# Patient Record
Sex: FEMALE | Race: WHITE | NOT HISPANIC OR LATINO | Employment: FULL TIME | URBAN - METROPOLITAN AREA
[De-identification: names, ages, dates, MRNs, and addresses within clinical notes are randomized per-mention and may not be internally consistent; named-entity substitution may affect disease eponyms.]

---

## 2016-06-22 LAB
ALT SERPL-CCNC: 12 U/L (ref 6–29)
AST SERPL-CCNC: 16 U/L (ref 10–30)
CREAT SERPL-MCNC: 0.81 MG/DL (ref 0.5–1.1)
GFR SERPL CREATININE-BSD FRML MDRD: 100 ML/MIN/1.73M2
GLUCOSE SERPL-MCNC: 86 MG/DL (ref 65–99)
HEP C HIM: NORMAL
POTASSIUM SERPL-SCNC: 4.1 MMOL/L (ref 3.5–5.3)

## 2016-11-23 LAB — TSH SERPL-ACNC: 2.01 UIU/ML (ref 0.27–4.2)

## 2017-02-05 LAB — TSH SERPL-ACNC: 3.56 UIU/ML (ref 0.27–4.2)

## 2017-03-15 ENCOUNTER — TRANSFERRED RECORDS (OUTPATIENT)
Dept: HEALTH INFORMATION MANAGEMENT | Facility: CLINIC | Age: 27
End: 2017-03-15

## 2017-03-29 LAB — TSH SERPL-ACNC: 0.33 UIU/ML (ref 0.27–4.2)

## 2017-04-04 LAB — HEP C HIM: NORMAL

## 2017-04-20 LAB — TSH SERPL-ACNC: 2.05 UIU/ML (ref 0.27–4.2)

## 2017-05-05 ENCOUNTER — OFFICE VISIT (OUTPATIENT)
Dept: FAMILY MEDICINE | Facility: CLINIC | Age: 27
End: 2017-05-05
Payer: COMMERCIAL

## 2017-05-05 VITALS
SYSTOLIC BLOOD PRESSURE: 122 MMHG | OXYGEN SATURATION: 100 % | BODY MASS INDEX: 22.28 KG/M2 | DIASTOLIC BLOOD PRESSURE: 85 MMHG | WEIGHT: 118 LBS | HEIGHT: 61 IN | TEMPERATURE: 99.3 F | HEART RATE: 64 BPM

## 2017-05-05 DIAGNOSIS — F41.8 DEPRESSION WITH ANXIETY: Primary | ICD-10-CM

## 2017-05-05 PROCEDURE — 99203 OFFICE O/P NEW LOW 30 MIN: CPT | Performed by: INTERNAL MEDICINE

## 2017-05-05 RX ORDER — CITALOPRAM HYDROBROMIDE 20 MG/1
20 TABLET ORAL DAILY
Qty: 30 TABLET | Refills: 1 | Status: SHIPPED | OUTPATIENT
Start: 2017-05-05 | End: 2018-08-13 | Stop reason: DRUGHIGH

## 2017-05-05 RX ORDER — ALPRAZOLAM 0.5 MG
0.5 TABLET ORAL 2 TIMES DAILY PRN
Qty: 40 TABLET | Refills: 0 | Status: SHIPPED | OUTPATIENT
Start: 2017-05-05 | End: 2018-04-09

## 2017-05-05 ASSESSMENT — ANXIETY QUESTIONNAIRES
6. BECOMING EASILY ANNOYED OR IRRITABLE: MORE THAN HALF THE DAYS
7. FEELING AFRAID AS IF SOMETHING AWFUL MIGHT HAPPEN: NEARLY EVERY DAY
3. WORRYING TOO MUCH ABOUT DIFFERENT THINGS: NEARLY EVERY DAY
GAD7 TOTAL SCORE: 19
1. FEELING NERVOUS, ANXIOUS, OR ON EDGE: NEARLY EVERY DAY
5. BEING SO RESTLESS THAT IT IS HARD TO SIT STILL: MORE THAN HALF THE DAYS
IF YOU CHECKED OFF ANY PROBLEMS ON THIS QUESTIONNAIRE, HOW DIFFICULT HAVE THESE PROBLEMS MADE IT FOR YOU TO DO YOUR WORK, TAKE CARE OF THINGS AT HOME, OR GET ALONG WITH OTHER PEOPLE: VERY DIFFICULT
2. NOT BEING ABLE TO STOP OR CONTROL WORRYING: NEARLY EVERY DAY

## 2017-05-05 ASSESSMENT — ENCOUNTER SYMPTOMS
NAUSEA: 0
TREMORS: 0
PALPITATIONS: 0
CONSTIPATION: 0
WEIGHT LOSS: 0
HALLUCINATIONS: 0
INSOMNIA: 1
DIZZINESS: 0
VOMITING: 0
NERVOUS/ANXIOUS: 1
HEADACHES: 0
MYALGIAS: 0
BLURRED VISION: 0
DIARRHEA: 0
ABDOMINAL PAIN: 0
DEPRESSION: 1

## 2017-05-05 ASSESSMENT — PATIENT HEALTH QUESTIONNAIRE - PHQ9: 5. POOR APPETITE OR OVEREATING: NEARLY EVERY DAY

## 2017-05-05 NOTE — NURSING NOTE
"Chief Complaint   Patient presents with     Depression     pt wants to discuss going back on the citalopram; experiencing depression and anxiety since having 2 miscarriages and issues with infertility the past year     Anxiety       Initial /85 (BP Location: Left arm, Cuff Size: Adult Regular)  Pulse 64  Temp 99.3  F (37.4  C) (Oral)  Ht 5' 1\" (1.549 m)  Wt 118 lb (53.5 kg)  SpO2 100%  BMI 22.3 kg/m2 Estimated body mass index is 22.3 kg/(m^2) as calculated from the following:    Height as of this encounter: 5' 1\" (1.549 m).    Weight as of this encounter: 118 lb (53.5 kg).  Medication Reconciliation: complete     Daysi Mccallum MA    "

## 2017-05-05 NOTE — PATIENT INSTRUCTIONS
Call doctor if your depression/anxiety persist/worsens, or if you develop new symptoms or side effects from the medication.    Follow up in 1 month.

## 2017-05-05 NOTE — MR AVS SNAPSHOT
After Visit Summary   5/5/2017    Dave Meyer    MRN: [id number]           Patient Information     Date Of Birth          1990        Visit Information        Provider Department      5/5/2017 4:30 PM Rojas Pappas MD [facility]        Today's Diagnoses     Depression with anxiety    -  1      Care Instructions    Call doctor if your depression/anxiety persist/worsens, or if you develop new symptoms or side effects from the medication.    Follow up in 1 month.          Follow-ups after your visit        Who to contact     If you have questions or need follow up information about today's clinic visit or your schedule please contact [facility] directly at [phone].  Normal or non-critical lab and imaging results will be communicated to you by Cat Amaniahart, letter or phone within 4 business days after the clinic has received the results. If you do not hear from us within 7 days, please contact the clinic through Cat Amaniahart or phone. If you have a critical or abnormal lab result, we will notify you by phone as soon as possible.  Submit refill requests through [employer]iders or call your pharmacy and they will forward the refill request to us. Please allow 3 business days for your refill to be completed.          Additional Information About Your Visit        MyChart Information     [employer]iders gives you secure access to your electronic health record. If you see a primary care provider, you can also send messages to your care team and make appointments. If you have questions, please call your primary care clinic.  If you do not have a primary care provider, please call [phone] and they will assist you.        Care EveryWhere ID     This is your Care EveryWhere ID. This could be used by other organizations to access your [place] medical records  [id number]        Your Vitals Were     Pulse Temperature Height Pulse Oximetry BMI (Body Mass Index)        "64 99.3  F (37.4  C) (Oral) 5' 1\" (1.549 m) 100% 22.3 kg/m2        Blood Pressure from Last 3 Encounters:   05/05/17 122/85   06/10/16 106/64   05/11/16 120/66    Weight from Last 3 Encounters:   05/05/17 118 lb (53.5 kg)   06/10/16 122 lb (55.3 kg)   05/11/16 122 lb (55.3 kg)              Today, you had the following     No orders found for display         Today's Medication Changes          These changes are accurate as of: 5/5/17  4:46 PM.  If you have any questions, ask your nurse or doctor.               Start taking these medicines.        Dose/Directions    citalopram 20 MG tablet   Commonly known as:  celeXA   Used for:  Depression with anxiety   Started by:  Rojas Pappas MD        Dose:  20 mg   Take 1 tablet (20 mg) by mouth daily Start with half a tablet once a day for the first week.   Quantity:  30 tablet   Refills:  1         Stop taking these medicines if you haven't already. Please contact your care team if you have questions.     choriogonadotropin arnol 250 MCG/0.5ML syringe SC INJ   Commonly known as:  OVIDREL   Stopped by:  Rojas Pappas MD           letrozole 2.5 MG tablet   Commonly known as:  FEMARA   Stopped by:  Rojas Pappas MD                Where to get your medicines      These medications were sent to [facility] [address] [place] [address] [address] AT [facility] [address] [place] [address]     Phone:  [phone]     citalopram 20 MG tablet                Primary Care Provider    None Specified       No primary provider on file.        Thank you!     Thank you for choosing [facility]  for your care. Our goal is always to provide you with excellent care. Hearing back from our patients is one way we can continue to improve our services. Please take a few minutes to complete the written survey that you may receive in the mail after your visit with us. Thank you!             Your " Updated Medication List - Protect others around you: Learn how to safely use, store and throw away your medicines at www.disposemymeds.org.          This list is accurate as of: 5/5/17  4:46 PM.  Always use your most recent med list.                   Brand Name Dispense Instructions for use    citalopram 20 MG tablet    celeXA    30 tablet    Take 1 tablet (20 mg) by mouth daily Start with half a tablet once a day for the first week.       SYNTHROID 75 MCG tablet   Generic drug:  levothyroxine

## 2017-05-06 ASSESSMENT — ANXIETY QUESTIONNAIRES: GAD7 TOTAL SCORE: 19

## 2017-05-06 ASSESSMENT — PATIENT HEALTH QUESTIONNAIRE - PHQ9: SUM OF ALL RESPONSES TO PHQ QUESTIONS 1-9: 16

## 2017-11-09 ENCOUNTER — TELEPHONE (OUTPATIENT)
Dept: OBGYN | Facility: CLINIC | Age: 27
End: 2017-11-09

## 2017-11-09 ENCOUNTER — NURSE TRIAGE (OUTPATIENT)
Dept: NURSING | Facility: CLINIC | Age: 27
End: 2017-11-09

## 2017-11-09 DIAGNOSIS — O09.299 HISTORY OF MISCARRIAGE, CURRENTLY PREGNANT: ICD-10-CM

## 2017-11-09 DIAGNOSIS — E03.9 HYPOTHYROIDISM: ICD-10-CM

## 2017-11-09 DIAGNOSIS — O26.851 SPOTTING AFFECTING PREGNANCY IN FIRST TRIMESTER: ICD-10-CM

## 2017-11-09 DIAGNOSIS — E03.9 HYPOTHYROIDISM: Primary | ICD-10-CM

## 2017-11-09 PROCEDURE — 84702 CHORIONIC GONADOTROPIN TEST: CPT | Performed by: OBSTETRICS & GYNECOLOGY

## 2017-11-09 PROCEDURE — 36415 COLL VENOUS BLD VENIPUNCTURE: CPT | Performed by: OBSTETRICS & GYNECOLOGY

## 2017-11-09 PROCEDURE — 84443 ASSAY THYROID STIM HORMONE: CPT | Performed by: OBSTETRICS & GYNECOLOGY

## 2017-11-09 PROCEDURE — 84439 ASSAY OF FREE THYROXINE: CPT | Performed by: OBSTETRICS & GYNECOLOGY

## 2017-11-09 RX ORDER — LEVOTHYROXINE SODIUM 88 UG/1
88 TABLET ORAL DAILY
Refills: 2 | COMMUNITY
Start: 2017-04-01 | End: 2017-11-21

## 2017-11-09 NOTE — TELEPHONE ENCOUNTER
"Pt calling back, LMP 10/17/17, this pregnancy occurred without interventions. Last pregnancies that resulted in miscarriages occured this February, and April of 2017 she had done IVF for both. Pt was on multiple medications for IVF including progesterone and \"everything looked great the beta just didn't rise.\" Pt is wondering if she needs to start progesterone for this pregnancy.    Pt states he has hypothyroidism and takes 88mcg synthroid. Pt's thyroid level last pregnancy had \"shot up quite a bit\" at about 4 weeks of pregnancy and medication had to be adjusted.   Pt reports \"very light spotting, organge in color.\" Denies pain.  Informed pt to schedule her NOB and RN would send other requests to on call MD. Pt stated understanding.   Routing to on call Dr. Alfaro to review and advise- does pt need to start any medications? Ok for pt to come in for thyroid and hcg quant labs?   "

## 2017-11-09 NOTE — TELEPHONE ENCOUNTER
LM informing pt to schedule her NOB at 8 weeks according to her LMP. Left call back number for any questions and to speak to triage so her OB history can be updated un her chart.

## 2017-11-09 NOTE — TELEPHONE ENCOUNTER
Called pt, informed of Dr. Alfaro's note below, and labs ordered. Pt states she is 99% positive her blood type is positive but would check with her infertility clinic and if negative would call to inform. Pt scheduled lab appt, and given outpatient lab number for HCG draw on Saturday. Pt stated understanding and had no further questions.

## 2017-11-09 NOTE — TELEPHONE ENCOUNTER
Saw specialists, as referred. Now pregnant naturally. Usually don't go in for 8-12 weeks. Because of history of miscarriages, should she come in earlier? Sent high priority message to the clinic.  Nemo Murillo RN-[facility]

## 2017-11-09 NOTE — TELEPHONE ENCOUNTER
Patient was seen in the past and referred to fertility clinic. She now got pregnant naturally and is wondering if there is anything special that needs to be done or seen sooner since she has an history of miscarriages? Please call her.     Nemo Murillo RN-[facility]

## 2017-11-09 NOTE — TELEPHONE ENCOUNTER
Reason for Call:  Other call back    Detailed comments: patient is calling back    Phone Number Patient can be reached at: Cell number on file:    Telephone Information:   Mobile [phone]       Best Time: today    Can we leave a detailed message on this number? YES    Call taken on 11/9/2017 at 9:49 AM by Drea Cruz

## 2017-11-09 NOTE — TELEPHONE ENCOUNTER
Progesterone used in assisted reproductive cycles. Don't need for spontaneous. OK for HCg. Too early for u/s.  Make sure Rh pos.

## 2017-11-11 LAB
B-HCG SERPL-ACNC: 7 IU/L (ref 0–5)
T4 FREE SERPL-MCNC: 1.03 NG/DL (ref 0.76–1.46)
TSH SERPL DL<=0.005 MIU/L-ACNC: 1.02 MU/L (ref 0.4–4)

## 2017-11-19 ENCOUNTER — MYC MEDICAL ADVICE (OUTPATIENT)
Dept: OBGYN | Facility: CLINIC | Age: 27
End: 2017-11-19

## 2017-11-19 DIAGNOSIS — E03.9 HYPOTHYROIDISM, UNSPECIFIED TYPE: ICD-10-CM

## 2017-11-19 DIAGNOSIS — O03.9 MISCARRIAGE: Primary | ICD-10-CM

## 2017-11-20 NOTE — TELEPHONE ENCOUNTER
See Bony msg below- Pt requesting fill of her synthroid medication. ( Levothyroxine 88mcg)    LMTCB  Need to clarify information.  In reviewing chart- no history on who is the prescriber of the medication. Pt has not been seen in our clinic for annual exams.

## 2017-11-21 RX ORDER — LEVOTHYROXINE SODIUM 88 UG/1
88 TABLET ORAL DAILY
Qty: 30 TABLET | Refills: 0 | Status: SHIPPED | OUTPATIENT
Start: 2017-11-21 | End: 2017-11-29

## 2017-11-21 NOTE — TELEPHONE ENCOUNTER
Pt called back, Dr. Mann at [facility] was previously prescribing Rx. Pt took a break for a couple months from seeing Dr. Mann due to pregnancy loses. Pt has an appt next wk to see Dr. Mann but because she just had her Thyroid labs drawn last on 11/9/17 at our clinic pt is wondering if can get the Rx refilled and also to avoid another blood draw at [facility]. Pt last seen in office for visit on 6/10/16 (follicle study appt). Pt aware she is due for an annual. Routing to on-call (Dr. Bose) to review/advise (as pt's last provider she saw consistently was Lisa Callejas PA-C). Pt takes 88 mcg of Levothyroxine daily. Ok for one month giving her enough time to make an annual appt?    11/9/17 lab values:  -TSH: 1.02  -T4 Free: 1.03      When call pt back Triage needs to remind pt to come in this wk for lab follow up of her HCG quant that Dr. Alfaro recommended, lab has been placed but no lab appt is scheduled by pt.

## 2017-11-21 NOTE — TELEPHONE ENCOUNTER
Rx sent to preferred pharmacy for 30 tabs/0rf. Called pt and informed her Dr. Bose' message below. Relayed to pt that if we are going to be sending Rx's that she needs to be seen for annual exam. Pt verbalized understanding, did not want to be transferred to scheduling to make appt, she needed to look at her calendar when she gets home.     Informed about returning to clinic this wk for HCG quant appt and was recommended to do so last wk. Pt voiced after her labs were drawn she had a big bleed and took a UPT and it was negative. Informed typically do not need to return then for HCG quant lab as home UPT was negative. Will send to [place] for update. HCG quant lab order cancelled. Informed if has questions to let us know.

## 2017-11-21 NOTE — NURSING NOTE
HCG quant order placed/futured out.    Copy and pasted result note below:    Entered by Cesario Alfaro MD at 11/13/2017  7:37 AM   Read by Dave Meyer at 11/19/2017  8:13 PM   Dear Dave,   HCG is very low. Need another one to see if it's going up.   Can repeat this week.   Call to make a lab appointment         Cesario Alfaro MD

## 2017-11-27 ENCOUNTER — TRANSFERRED RECORDS (OUTPATIENT)
Dept: HEALTH INFORMATION MANAGEMENT | Facility: CLINIC | Age: 27
End: 2017-11-27

## 2017-11-29 NOTE — TELEPHONE ENCOUNTER
Appears she is UPT on pap. Only age 27. Seeing infertility. OK to refill her synthroid for 6 months.

## 2017-12-06 ENCOUNTER — TRANSFERRED RECORDS (OUTPATIENT)
Dept: HEALTH INFORMATION MANAGEMENT | Facility: CLINIC | Age: 27
End: 2017-12-06

## 2017-12-06 LAB
ALT SERPL-CCNC: 14 U/L (ref 6–29)
AST SERPL-CCNC: 16 U/L (ref 10–30)
CHOLEST SERPL-MCNC: 229 MG/DL
CREAT SERPL-MCNC: 0.91 MG/DL (ref 0.5–1.1)
GFR SERPL CREATININE-BSD FRML MDRD: 85 ML/MIN/1.73M2
GLUCOSE SERPL-MCNC: 114 MG/DL (ref 65–99)
HDLC SERPL-MCNC: 61 MG/DL
HEP C HIM: NORMAL
LDLC SERPL CALC-MCNC: 142 MG/DL
NONHDLC SERPL-MCNC: 168 MG/DL
POTASSIUM SERPL-SCNC: 3.7 MMOL/L (ref 3.5–5.3)
TRIGL SERPL-MCNC: 137 MG/DL

## 2017-12-12 RX ORDER — LEVOTHYROXINE SODIUM 88 UG/1
88 TABLET ORAL DAILY
Qty: 90 TABLET | Refills: 1 | Status: SHIPPED | OUTPATIENT
Start: 2017-12-12 | End: 2018-03-01 | Stop reason: ALTCHOICE

## 2017-12-13 LAB — TSH SERPL-ACNC: 3.05 UIU/ML (ref 0.27–4.2)

## 2018-01-03 ENCOUNTER — TRANSFERRED RECORDS (OUTPATIENT)
Dept: HEALTH INFORMATION MANAGEMENT | Facility: CLINIC | Age: 28
End: 2018-01-03

## 2018-01-03 LAB — TSH SERPL-ACNC: 1.01 UIU/ML (ref 0.27–4.2)

## 2018-01-04 ENCOUNTER — MYC MEDICAL ADVICE (OUTPATIENT)
Dept: OBGYN | Facility: CLINIC | Age: 28
End: 2018-01-04

## 2018-01-04 DIAGNOSIS — O36.80X0 PREGNANCY WITH INCONCLUSIVE FETAL VIABILITY: Primary | ICD-10-CM

## 2018-01-22 ENCOUNTER — RADIANT APPOINTMENT (OUTPATIENT)
Dept: ULTRASOUND IMAGING | Facility: CLINIC | Age: 28
End: 2018-01-22
Payer: COMMERCIAL

## 2018-01-22 ENCOUNTER — PRENATAL OFFICE VISIT (OUTPATIENT)
Dept: OBGYN | Facility: CLINIC | Age: 28
End: 2018-01-22
Payer: COMMERCIAL

## 2018-01-22 VITALS
DIASTOLIC BLOOD PRESSURE: 54 MMHG | BODY MASS INDEX: 22.54 KG/M2 | WEIGHT: 119.4 LBS | SYSTOLIC BLOOD PRESSURE: 106 MMHG | HEIGHT: 61 IN

## 2018-01-22 DIAGNOSIS — O36.80X0 PREGNANCY WITH INCONCLUSIVE FETAL VIABILITY: ICD-10-CM

## 2018-01-22 DIAGNOSIS — F41.8 DEPRESSION WITH ANXIETY: ICD-10-CM

## 2018-01-22 DIAGNOSIS — E03.9 HYPOTHYROIDISM, UNSPECIFIED TYPE: ICD-10-CM

## 2018-01-22 DIAGNOSIS — Z13.79 GENETIC SCREENING: ICD-10-CM

## 2018-01-22 DIAGNOSIS — O26.20 RECURRENT PREGNANCY LOSS, CURRENTLY PREGNANT: ICD-10-CM

## 2018-01-22 DIAGNOSIS — O09.91 SUPERVISION OF HIGH RISK PREGNANCY IN FIRST TRIMESTER: Primary | ICD-10-CM

## 2018-01-22 PROBLEM — O09.90 SUPERVISION OF HIGH-RISK PREGNANCY: Status: ACTIVE | Noted: 2018-01-22

## 2018-01-22 LAB
ALBUMIN UR-MCNC: NEGATIVE MG/DL
APPEARANCE UR: CLEAR
BILIRUB UR QL STRIP: NEGATIVE
COLOR UR AUTO: YELLOW
GLUCOSE UR STRIP-MCNC: NEGATIVE MG/DL
HGB UR QL STRIP: NEGATIVE
KETONES UR STRIP-MCNC: NEGATIVE MG/DL
LEUKOCYTE ESTERASE UR QL STRIP: NEGATIVE
NITRATE UR QL: NEGATIVE
PH UR STRIP: 6 PH (ref 5–7)
RBC #/AREA URNS AUTO: NORMAL /HPF
SOURCE: NORMAL
SP GR UR STRIP: <=1.005 (ref 1–1.03)
UROBILINOGEN UR STRIP-ACNC: 0.2 EU/DL (ref 0.2–1)
WBC #/AREA URNS AUTO: NORMAL /HPF

## 2018-01-22 PROCEDURE — 87591 N.GONORRHOEAE DNA AMP PROB: CPT | Performed by: OBSTETRICS & GYNECOLOGY

## 2018-01-22 PROCEDURE — 76817 TRANSVAGINAL US OBSTETRIC: CPT | Performed by: OBSTETRICS & GYNECOLOGY

## 2018-01-22 PROCEDURE — 87086 URINE CULTURE/COLONY COUNT: CPT | Performed by: OBSTETRICS & GYNECOLOGY

## 2018-01-22 PROCEDURE — 99207 ZZC FIRST OB VISIT: CPT | Performed by: OBSTETRICS & GYNECOLOGY

## 2018-01-22 PROCEDURE — 81001 URINALYSIS AUTO W/SCOPE: CPT | Performed by: OBSTETRICS & GYNECOLOGY

## 2018-01-22 PROCEDURE — 87491 CHLMYD TRACH DNA AMP PROBE: CPT | Performed by: OBSTETRICS & GYNECOLOGY

## 2018-01-22 ASSESSMENT — PATIENT HEALTH QUESTIONNAIRE - PHQ9
SUM OF ALL RESPONSES TO PHQ QUESTIONS 1-9: 2
5. POOR APPETITE OR OVEREATING: MORE THAN HALF THE DAYS

## 2018-01-22 ASSESSMENT — ANXIETY QUESTIONNAIRES
3. WORRYING TOO MUCH ABOUT DIFFERENT THINGS: SEVERAL DAYS
1. FEELING NERVOUS, ANXIOUS, OR ON EDGE: MORE THAN HALF THE DAYS
IF YOU CHECKED OFF ANY PROBLEMS ON THIS QUESTIONNAIRE, HOW DIFFICULT HAVE THESE PROBLEMS MADE IT FOR YOU TO DO YOUR WORK, TAKE CARE OF THINGS AT HOME, OR GET ALONG WITH OTHER PEOPLE: SOMEWHAT DIFFICULT
7. FEELING AFRAID AS IF SOMETHING AWFUL MIGHT HAPPEN: MORE THAN HALF THE DAYS
6. BECOMING EASILY ANNOYED OR IRRITABLE: NOT AT ALL
2. NOT BEING ABLE TO STOP OR CONTROL WORRYING: NEARLY EVERY DAY
5. BEING SO RESTLESS THAT IT IS HARD TO SIT STILL: NOT AT ALL
GAD7 TOTAL SCORE: 10

## 2018-01-22 NOTE — PROGRESS NOTES
This is a 27 year old female patient,   who presents for her first obstetrical visit.    Patient's last menstrual period was 11/15/2017..  This gives her an EDC of Aug 22, 2018 .  She is 9w5d weeks.  Her cycles are regular.  Her last menstrual period was abnormal, longer than normal.  She has had an ultrasound on 18 which showed IUP @ 9w5d. .  Since her LMP, she has experienced  anxiety, nausea, fatigue).  She denies emesis, abdominal pain, headache, loss of appetite, vaginal discharge, dysuria, pelvic pain, urinary urgency, lightheadedness, urinary frequency, vaginal bleeding, hemorrhoids and constipation.    PAP Last pap on 10/2015    Last appt with [facility] 1/3/18, on prometrium since 4wks.   Had US at 7wk, agreed with LMP dating 18.   Hx two failed IVF procedures , . Spont SAB 10/17, chemical pregnancy.    Hx hypothyroidism. Last tested 1/3/18 at [facility], changed at 4wks gestation to 150mcg. Last test was normal.   Has always tested thyroid with her GYN.     Mood cortez is doing well. Having some tiredness. Stable on celexa.    Desires Innatal.       Past History:  Her past medical history   Past Medical History:   Diagnosis Date     Acne     On spironolactone until 7/15     Anovular menstruation      Depressive disorder      Hypothyroidism      PCOS (polycystic ovarian syndrome)      Recurrent pregnancy loss (CODE)    .      Three miscarriages    Since her last LMP she denies use of alcohol, tobacco and street drugs.    HISTORY:  Obstetric History       T0      L0     SAB2   TAB0   Ectopic0   Multiple0   Live Births0       # Outcome Date GA Lbr Noble/2nd Weight Sex Delivery Anes PTL Lv   4 Current            3 SAB 10/2017           2 AB 17 5w0d          1 SAB 02/10/17 5w0d               Past Medical History:   Diagnosis Date     Acne     On spironolactone until 7/15     Anovular menstruation      Depressive disorder      Hypothyroidism      PCOS (polycystic ovarian syndrome)   "    Recurrent pregnancy loss (CODE)      Past Surgical History:   Procedure Laterality Date     NO HISTORY OF SURGERY       Family History   Problem Relation Age of Onset     Hypothyroidism Mother      Type 2 Diabetes Mother      Hypothyroidism Sister      Depression Sister      Hypothyroidism Maternal Grandmother      Hypothyroidism Maternal Aunt      Depression Brother      Depression Sister      Social History     Social History     Marital status:      Spouse name: N/A     Number of children: N/A     Years of education: N/A     Social History Main Topics     Smoking status: Never Smoker     Smokeless tobacco: Never Used     Alcohol use No     Drug use: No     Sexual activity: Yes     Partners: Male     Birth control/ protection: None     Other Topics Concern     None     Social History Narrative     Current Outpatient Prescriptions   Medication Sig     ASPIRIN PO Take 81 mg by mouth daily     levothyroxine (SYNTHROID/LEVOTHROID) 88 MCG tablet Take 1 tablet (88 mcg) by mouth daily (Patient taking differently: Take 150 mcg by mouth daily )     citalopram (CELEXA) 20 MG tablet Take 1 tablet (20 mg) by mouth daily Start with half a tablet once a day for the first week.     ALPRAZolam (XANAX) 0.5 MG tablet Take 1 tablet (0.5 mg) by mouth 2 times daily as needed for anxiety (Patient not taking: Reported on 1/22/2018)     No current facility-administered medications for this visit.      No Known Allergies    Past medical, surgical, social and family history were reviewed and updated in EPIC.    ROS:   12 point review of systems negative other than symptoms noted below.  Constitutional: Fatigue  Gastrointestinal: Nausea  Psychiatric: Anxiety    EXAM:  /54 (BP Location: Right arm, Patient Position: Sitting, Cuff Size: Adult Regular)  Ht 5' 1\" (1.549 m)  Wt 119 lb 6.4 oz (54.2 kg)  LMP 11/15/2017  BMI 22.56 kg/m2   BMI: Body mass index is 22.56 kg/(m^2).    EXAM:  Constitutional:  Appearance: Well " nourished, well developed alert, in no acute distress.  Neck:   Lymph Nodes:  No lymphadenopathy present.    Thyroid:  Gland size normal, nontender, no nodules or masses present  on palpation.  Chest:  Respiratory Effort:  Breathing unlabored.  Cardiovascular:  Heart Auscultation:  Regular rate, normal rhythm, no murmurs    present.  Breasts: Inspection of Breasts:  No lymphadenopathy present., Palpation of Breasts and Axillae:  No masses present on palpation, no breast tenderness., Axillary Lymph Nodes:  No lymphadenopathy present. and No nodularity, asymmetry or nipple discharge bilaterally.    Axillary Lymph Nodes:  No lymphadenopathy present.  Gastrointestinal:  Abdominal Examination:  Abdomen nontender to palpation, tone  normal without rigidity or guarding, no masses present, umbilicus without  Lesions.    Liver and speen:  No hepatomegaly present, liver nontender to  palpation.    Hernias:  No hernias present.  Lymphatic: Lymph Nodes:  No other lymphadenopathy present.  Skin:  General Inspection:  No rashes present, no lesions present, no areas of  discoloration.    Genitalia and Groin:  No rashes present, no lesions present, no areas of  discoloration, no masses present.  Neurologic/Psychiatric:    Mental Status:  Oriented X3.    Pelvic Exam:  External Genitalia:     Normal appearance for age, no discharge present, no tenderness present, no inflammatory lesions present, color normal  Vagina:     Normal vaginal vault without central or paravaginal defects, no discharge present, no inflammatory lesions present, no masses present  Bladder:     Nontender to palpation  Urethra:   Urethral Body:  Urethra palpation normal, urethra structural support normal   Urethral Meatus:  No erythema or lesions present  Cervix:     Appearance healthy, no lesions present, nontender to palpation, no bleeding present  Perineum:     Perineum within normal limits, no evidence of trauma, no rashes or skin lesions present  Anus:      Anus within normal limits, no hemorrhoids present  Inguinal Lymph Nodes:     No lymphadenopathy present  Pubic Hair:     Normal pubic hair distribution for age  Genitalia and Groin:     No rashes present, no lesions present, no areas of discoloration, no masses present      ASSESSMENT:      ICD-10-CM    1. Supervision of high risk pregnancy in first trimester O09.91 Urine Culture Aerobic Bacterial     UA with Microscopic     ABO/Rh type and screen     Anti Treponema     CBC with platelets differential     Hepatitis B surface antigen     HIV Antigen Antibody Combo     Rubella Antibody IgG Quantitative     Non Invasive Prenatal Test Cell Free DNA     ASPIRIN PO     Chlamydia trachomatis PCR     Neisseria gonorrhoeae PCR   2. Genetic screening Z13.79 Urine Culture Aerobic Bacterial     UA with Microscopic     Non Invasive Prenatal Test Cell Free DNA     ASPIRIN PO   3. Recurrent pregnancy loss, currently pregnant O26.20    4. Hypothyroidism, unspecified type E03.9    5. Depression with anxiety F41.8        PLAN/PATIENT INSTRUCTIONS:    -UTD cervical cancer screening. WIll be due at PP visit  -NOB labs today, orders reviewed. Will future and pt will return  -Desires aneuploidy screening. Discussed options for diagnostic and screening tests, benefits and limitations of each. Will return at 10wks for Innatal and NOB labs  -Reviewed dating US, is consistent with LMP dating and 7wk US (per pt) at [facility]. EDC 8/22/18 based on LMP.  -Hx RPL. Cont prometrium as instructed per [facility] until 10wk  -Anxiety. Doing well on celexa. Continue. Will monitor throughout pregnancy, discussed increased risk PP depression.  -Hypothyroidism. Recent check at [facility] wnl per pt. Will repeat TFTs at next visit and likely per trimester. Cont current levothyroxine  -miscarriage precautions reviewed. F/U 2-4wks, may return sooner for doptones/viability confirmation if desired due to hx RPL.            Ingris Armstrong Masters, DO

## 2018-01-22 NOTE — MR AVS SNAPSHOT
After Visit Summary   1/22/2018    Dave Meyer    MRN: [id number]           Patient Information     Date Of Birth          1990        Visit Information        Provider Department      1/22/2018 3:10 PM s, Ingris Armstrong, DO; [facility]        Today's Diagnoses     Supervision of high risk pregnancy in first trimester    -  1    Genetic screening           Follow-ups after your visit        Your next 10 appointments already scheduled     Jan 25, 2018  8:30 AM CST   LAB with [facility]   [facility] ([facility])    [address] [place] [id number] [phone]           Please do not eat 10-12 hours before your appointment if you are coming in fasting for labs on lipids, cholesterol, or glucose (sugar). This does not apply to pregnant women. Water, hot tea and black coffee (with nothing added) are okay. Do not drink other fluids, diet soda or chew gum.              Who to contact     If you have questions or need follow up information about today's clinic visit or your schedule please contact [facility] directly at [phone].  Normal or non-critical lab and imaging results will be communicated to you by MyChart, letter or phone within 4 business days after the clinic has received the results. If you do not hear from us within 7 days, please contact the clinic through Local Matter[employer] or phone. If you have a critical or abnormal lab result, we will notify you by phone as soon as possible.  Submit refill requests through [employer] or call your pharmacy and they will forward the refill request to us. Please allow 3 business days for your refill to be completed.          Additional Information About Your Visit        MyChart Information     [employer] gives you secure access to your electronic health record. If you see a primary care provider, you can also send messages to your  "care team and make appointments. If you have questions, please call your primary care clinic.  If you do not have a primary care provider, please call [phone] and they will assist you.        Care EveryWhere ID     This is your Care EveryWhere ID. This could be used by other organizations to access your [place] medical records  [id number]        Your Vitals Were     Height Last Period BMI (Body Mass Index)             5' 1\" (1.549 m) 11/15/2017 22.56 kg/m2          Blood Pressure from Last 3 Encounters:   01/22/18 106/54   05/05/17 122/85   06/10/16 106/64    Weight from Last 3 Encounters:   01/22/18 119 lb 6.4 oz (54.2 kg)   05/05/17 118 lb (53.5 kg)   06/10/16 122 lb (55.3 kg)              We Performed the Following     UA with Microscopic     Urine Culture Aerobic Bacterial          Today's Medication Changes          These changes are accurate as of: 1/22/18  4:20 PM.  If you have any questions, ask your nurse or doctor.               These medicines have changed or have updated prescriptions.        Dose/Directions    levothyroxine 88 MCG tablet   Commonly known as:  SYNTHROID/LEVOTHROID   This may have changed:  how much to take   Used for:  Hypothyroidism, unspecified type        Dose:  88 mcg   Take 1 tablet (88 mcg) by mouth daily   Quantity:  90 tablet   Refills:  1                Primary Care Provider Fax #    Physician No Ref-Primary [phone]       No address on file        Equal Access to Services     LULU FERRO : Hadii neli [place], wadellada luthiago, qaybta kaalmaalyssa valdez. So [facility] [phone].    ATENCIÓN: Si habla español, tiene a cedeno disposición servicios gratuitos de asistencia lingüística. Llame al [phone].    We comply with applicable federal civil rights laws and [place] laws. We do not discriminate on the basis of race, color, national origin, age, disability, sex, sexual orientation, or gender identity.          "   Thank you!     Thank you for choosing [facility]  for your care. Our goal is always to provide you with excellent care. Hearing back from our patients is one way we can continue to improve our services. Please take a few minutes to complete the written survey that you may receive in the mail after your visit with us. Thank you!             Your Updated Medication List - Protect others around you: Learn how to safely use, store and throw away your medicines at www.disposemymeds.org.          This list is accurate as of: 1/22/18  4:20 PM.  Always use your most recent med list.                   Brand Name Dispense Instructions for use Diagnosis    ALPRAZolam 0.5 MG tablet    XANAX    40 tablet    Take 1 tablet (0.5 mg) by mouth 2 times daily as needed for anxiety    Depression with anxiety       ASPIRIN PO      Take 81 mg by mouth daily        citalopram 20 MG tablet    celeXA    30 tablet    Take 1 tablet (20 mg) by mouth daily Start with half a tablet once a day for the first week.    Depression with anxiety       levothyroxine 88 MCG tablet    SYNTHROID/LEVOTHROID    90 tablet    Take 1 tablet (88 mcg) by mouth daily    Hypothyroidism, unspecified type

## 2018-01-22 NOTE — NURSING NOTE
Obstetrical Risk History    Patient presents for new OB labs and teaching.      1. Please indicate any condition you have or have had in the past:  Thyroid Disorder, Infertility, Depression  2. Do you smoke?  No  If yes, how many packs/day?   3. Do you drink alcoholic beverages? No  If yes, how often?  What type?   4. List any medications taken since your last period: Prenatal Vitamins, Levothyroxine, Baby Aspirin, Endometrim   5. List any recreational drugs (cocaine, marijuana, etc. used since your last period:None    6. List any chemical or radiation exposure that you've encountered: None  7. Are you on a restricted diet? No  If yes, please describe:  Do you have any Hindu objections to any form of treatment? No    GENETIC SCREENING    These questions apply to you, the baby's father, or anyone in either family with:    1. Patient's age 35 or greater at delivery? No  2. Tuvaluan, St Helenian, Mediterranean ancestry? No  3. Neural Tube Defect (Meningomyelocele, Spina Bifida, or Anencephaly)? No  4. Christianity, St Helenian Palauan or history of Walter-Sachs disease? No  5. Down's Syndrome?   No  6. Hemophilia or clotting disorder? No  7. Muscular Dystrophy? No  8. Cystic Fibrosis? No  9. Simon's Chorea? No  10. Mental Retardation? No  11. 3 or more miscarriages or a stillborn? Yes Pt has history of 3 miscarriages  12. Other inherited disease or chromosomal disorder? No  13. Have you or the baby's father had a child born with a birth defect? No  14. Did you or the baby's father have a birth defect yourselves? No    Do you have any other concerns about birth defects? No

## 2018-01-23 LAB
BACTERIA SPEC CULT: NORMAL
Lab: NORMAL
SPECIMEN SOURCE: NORMAL

## 2018-01-23 ASSESSMENT — ANXIETY QUESTIONNAIRES: GAD7 TOTAL SCORE: 10

## 2018-01-24 LAB
C TRACH DNA SPEC QL NAA+PROBE: NEGATIVE
N GONORRHOEA DNA SPEC QL NAA+PROBE: NEGATIVE
SPECIMEN SOURCE: NORMAL
SPECIMEN SOURCE: NORMAL

## 2018-01-25 ENCOUNTER — TRANSFERRED RECORDS (OUTPATIENT)
Dept: HEALTH INFORMATION MANAGEMENT | Facility: CLINIC | Age: 28
End: 2018-01-25

## 2018-01-25 DIAGNOSIS — O09.91 SUPERVISION OF HIGH RISK PREGNANCY IN FIRST TRIMESTER: ICD-10-CM

## 2018-01-25 DIAGNOSIS — Z13.79 GENETIC SCREENING: ICD-10-CM

## 2018-01-25 LAB
ABO + RH BLD: NORMAL
ABO + RH BLD: NORMAL
BASOPHILS # BLD AUTO: 0 10E9/L (ref 0–0.2)
BASOPHILS NFR BLD AUTO: 0.2 %
BLD GP AB SCN SERPL QL: NORMAL
BLOOD BANK CMNT PATIENT-IMP: NORMAL
DIFFERENTIAL METHOD BLD: NORMAL
EOSINOPHIL # BLD AUTO: 0.1 10E9/L (ref 0–0.7)
EOSINOPHIL NFR BLD AUTO: 1.8 %
ERYTHROCYTE [DISTWIDTH] IN BLOOD BY AUTOMATED COUNT: 12.6 % (ref 10–15)
HBV SURFACE AG SERPL QL IA: NONREACTIVE
HCT VFR BLD AUTO: 36.2 % (ref 35–47)
HGB BLD-MCNC: 12.5 G/DL (ref 11.7–15.7)
HIV 1+2 AB+HIV1 P24 AG SERPL QL IA: NONREACTIVE
LYMPHOCYTES # BLD AUTO: 1 10E9/L (ref 0.8–5.3)
LYMPHOCYTES NFR BLD AUTO: 22.2 %
MCH RBC QN AUTO: 30.5 PG (ref 26.5–33)
MCHC RBC AUTO-ENTMCNC: 34.5 G/DL (ref 31.5–36.5)
MCV RBC AUTO: 88 FL (ref 78–100)
MONOCYTES # BLD AUTO: 0.4 10E9/L (ref 0–1.3)
MONOCYTES NFR BLD AUTO: 8.3 %
NEUTROPHILS # BLD AUTO: 2.9 10E9/L (ref 1.6–8.3)
NEUTROPHILS NFR BLD AUTO: 67.5 %
PLATELET # BLD AUTO: 157 10E9/L (ref 150–450)
RBC # BLD AUTO: 4.1 10E12/L (ref 3.8–5.2)
SPECIMEN EXP DATE BLD: NORMAL
WBC # BLD AUTO: 4.3 10E9/L (ref 4–11)

## 2018-01-25 PROCEDURE — 36415 COLL VENOUS BLD VENIPUNCTURE: CPT | Performed by: OBSTETRICS & GYNECOLOGY

## 2018-01-25 PROCEDURE — 86762 RUBELLA ANTIBODY: CPT | Performed by: OBSTETRICS & GYNECOLOGY

## 2018-01-25 PROCEDURE — 87340 HEPATITIS B SURFACE AG IA: CPT | Performed by: OBSTETRICS & GYNECOLOGY

## 2018-01-25 PROCEDURE — 86850 RBC ANTIBODY SCREEN: CPT | Performed by: OBSTETRICS & GYNECOLOGY

## 2018-01-25 PROCEDURE — 86900 BLOOD TYPING SEROLOGIC ABO: CPT | Performed by: OBSTETRICS & GYNECOLOGY

## 2018-01-25 PROCEDURE — 85025 COMPLETE CBC W/AUTO DIFF WBC: CPT | Performed by: OBSTETRICS & GYNECOLOGY

## 2018-01-25 PROCEDURE — 86780 TREPONEMA PALLIDUM: CPT | Performed by: OBSTETRICS & GYNECOLOGY

## 2018-01-25 PROCEDURE — 40000791 ZZHCL STATISTIC VERIFI PRENATAL TRISOMY 21,18,13: Mod: 90 | Performed by: OBSTETRICS & GYNECOLOGY

## 2018-01-25 PROCEDURE — 86901 BLOOD TYPING SEROLOGIC RH(D): CPT | Performed by: OBSTETRICS & GYNECOLOGY

## 2018-01-25 PROCEDURE — 99000 SPECIMEN HANDLING OFFICE-LAB: CPT | Performed by: OBSTETRICS & GYNECOLOGY

## 2018-01-25 PROCEDURE — 87389 HIV-1 AG W/HIV-1&-2 AB AG IA: CPT | Performed by: OBSTETRICS & GYNECOLOGY

## 2018-01-26 LAB
RUBV IGG SERPL IA-ACNC: 43 IU/ML
T PALLIDUM IGG+IGM SER QL: NEGATIVE

## 2018-02-02 ENCOUNTER — TELEPHONE (OUTPATIENT)
Dept: NURSING | Facility: CLINIC | Age: 28
End: 2018-02-02

## 2018-02-02 NOTE — TELEPHONE ENCOUNTER
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   Routing to Dr. Briones as an FYI.

## 2018-02-26 ENCOUNTER — PRENATAL OFFICE VISIT (OUTPATIENT)
Dept: OBGYN | Facility: CLINIC | Age: 28
End: 2018-02-26
Payer: COMMERCIAL

## 2018-02-26 VITALS — WEIGHT: 125 LBS | DIASTOLIC BLOOD PRESSURE: 56 MMHG | BODY MASS INDEX: 23.62 KG/M2 | SYSTOLIC BLOOD PRESSURE: 118 MMHG

## 2018-02-26 DIAGNOSIS — E03.9 HYPOTHYROIDISM, UNSPECIFIED TYPE: Primary | ICD-10-CM

## 2018-02-26 DIAGNOSIS — O09.92 SUPERVISION OF HIGH RISK PREGNANCY IN SECOND TRIMESTER: ICD-10-CM

## 2018-02-26 DIAGNOSIS — Z3A.14 14 WEEKS GESTATION OF PREGNANCY: ICD-10-CM

## 2018-02-26 LAB
T4 FREE SERPL-MCNC: 1.58 NG/DL (ref 0.76–1.46)
TSH SERPL DL<=0.005 MIU/L-ACNC: 0.03 MU/L (ref 0.4–4)

## 2018-02-26 PROCEDURE — 84443 ASSAY THYROID STIM HORMONE: CPT | Performed by: OBSTETRICS & GYNECOLOGY

## 2018-02-26 PROCEDURE — 99207 ZZC PRENATAL VISIT: CPT | Performed by: OBSTETRICS & GYNECOLOGY

## 2018-02-26 PROCEDURE — 36415 COLL VENOUS BLD VENIPUNCTURE: CPT | Performed by: OBSTETRICS & GYNECOLOGY

## 2018-02-26 PROCEDURE — 84439 ASSAY OF FREE THYROXINE: CPT | Performed by: OBSTETRICS & GYNECOLOGY

## 2018-02-26 NOTE — MR AVS SNAPSHOT
After Visit Summary   2/26/2018    Dave Meyer    MRN: [id number]           Patient Information     Date Of Birth          1990        Visit Information        Provider Department      2/26/2018 8:40 AM sIngris,  [facility] [place]        Today's Diagnoses     Hypothyroidism, unspecified type    -  1    Supervision of high risk pregnancy in second trimester        14 weeks gestation of pregnancy           Follow-ups after your visit        Follow-up notes from your care team     Return in about 4 weeks (around 3/26/2018).      Your next 10 appointments already scheduled     Apr 09, 2018  8:00 AM CDT   US PELVIC COMPLETE W TRANSVAGINAL with WEUS1   [facility] [place] ([facility] [place])    [address] [place] [address] [phone]           Please bring a list of your medicines (including vitamins, minerals and over-the-counter drugs). Also, tell your doctor about any allergies you may have. Wear comfortable clothes and leave your valuables at home.  Adults: Drink six 8-ounce glasses of fluid one hour before your exam. Do NOT empty your bladder.  If you need to empty your bladder before your exam, try to release only a little bit of urine. Then, drink another 8oz glass of fluid.  Children: Children who are potty trained should drink at least 4 cups (32 oz) of liquid 45 minutes to one hour prior to the exam. The child s bladder must be full in order to achieve a diagnostic exam. If your child is very uncomfortable or has an urgent need to pee, please notify a technologist; they will try to find out how much longer the wait may be and provide instructions to help relieve the pressure. Occasionally it is medically necessary to insert a urinary catheter to fill the bladder.  Please call the Imaging Department at your exam site with any questions.            Apr 09, 2018  8:40 AM CDT   ESTABLISHED  PRENATAL with Ingris Armstrong Masters, DO   [facility] [place] ([facility] [place])    [address] [place] [address] [address]  [place] [address] [phone]              Future tests that were ordered for you today     Open Future Orders        Priority Expected Expires Ordered    US OB > 14 Weeks Complete Single Routine  2/27/2019 2/26/2018            Who to contact     If you have questions or need follow up information about today's clinic visit or your schedule please contact [facility] [place] directly at [phone].  Normal or non-critical lab and imaging results will be communicated to you by CrowdCompasshart, letter or phone within 4 business days after the clinic has received the results. If you do not hear from us within 7 days, please contact the clinic through CloudBiltt or phone. If you have a critical or abnormal lab result, we will notify you by phone as soon as possible.  Submit refill requests through Jianshu or call your pharmacy and they will forward the refill request to us. Please allow 3 business days for your refill to be completed.          Additional Information About Your Visit        CrowdCompasshart Information     Jianshu gives you secure access to your electronic health record. If you see a primary care provider, you can also send messages to your care team and make appointments. If you have questions, please call your primary care clinic.  If you do not have a primary care provider, please call [phone] and they will assist you.        Care EveryWhere ID     This is your Care EveryWhere ID. This could be used by other organizations to access your [place] medical records  [id number]        Your Vitals Were     Last Period Breastfeeding? BMI (Body Mass Index)             11/15/2017 No 23.62 kg/m2          Blood Pressure from Last 3 Encounters:   02/26/18 118/56   01/22/18 106/54   05/05/17 122/85    Weight from Last 3 Encounters:   02/26/18 125 lb (56.7 kg)    01/22/18 119 lb 6.4 oz (54.2 kg)   05/05/17 118 lb (53.5 kg)              We Performed the Following     TSH with free T4 reflex          Today's Medication Changes          These changes are accurate as of 2/26/18  8:51 AM.  If you have any questions, ask your nurse or doctor.               These medicines have changed or have updated prescriptions.        Dose/Directions    levothyroxine 88 MCG tablet   Commonly known as:  SYNTHROID/LEVOTHROID   This may have changed:  how much to take   Used for:  Hypothyroidism, unspecified type        Dose:  88 mcg   Take 1 tablet (88 mcg) by mouth daily   Quantity:  90 tablet   Refills:  1                Primary Care Provider Fax #    Physician No Ref-Primary [phone]       No address on file        Equal Access to Services     LULU FERRO : Deidre Covington, higinio starks, yeison ocampo, alyssa whittington. So [facility] [phone].    ATENCIÓN: Si habla español, tiene a cedeno disposición servicios gratuitos de asistencia lingüística. Llame al [phone].    We comply with applicable federal civil rights laws and [place] laws. We do not discriminate on the basis of race, color, national origin, age, disability, sex, sexual orientation, or gender identity.            Thank you!     Thank you for choosing [facility]  for your care. Our goal is always to provide you with excellent care. Hearing back from our patients is one way we can continue to improve our services. Please take a few minutes to complete the written survey that you may receive in the mail after your visit with us. Thank you!             Your Updated Medication List - Protect others around you: Learn how to safely use, store and throw away your medicines at www.disposemymeds.org.          This list is accurate as of 2/26/18  8:51 AM.  Always use your most recent med list.                   Brand Name Dispense Instructions for use  Diagnosis    ALPRAZolam 0.5 MG tablet    XANAX    40 tablet    Take 1 tablet (0.5 mg) by mouth 2 times daily as needed for anxiety    Depression with anxiety       ASPIRIN PO      Take 81 mg by mouth daily    Supervision of high risk pregnancy in first trimester, Genetic screening       citalopram 20 MG tablet    celeXA    30 tablet    Take 1 tablet (20 mg) by mouth daily Start with half a tablet once a day for the first week.    Depression with anxiety       levothyroxine 88 MCG tablet    SYNTHROID/LEVOTHROID    90 tablet    Take 1 tablet (88 mcg) by mouth daily    Hypothyroidism, unspecified type

## 2018-02-26 NOTE — PROGRESS NOTES
No loss of fluid/vaginal bleeding/regular contractions. No FM  -Hypothyroidism. 150mcg synthroid. Check TFTs today.  -Anatomy US next visit  - Labor precautions. F/U 4wk.     Ingris Armstrong Masters, DO

## 2018-02-28 ENCOUNTER — TELEPHONE (OUTPATIENT)
Dept: OBGYN | Facility: CLINIC | Age: 28
End: 2018-02-28

## 2018-02-28 DIAGNOSIS — E03.9 HYPOTHYROIDISM: Primary | ICD-10-CM

## 2018-02-28 NOTE — TELEPHONE ENCOUNTER
15 wks ob, had thyroid checked and abnormal - would like to know the plan. Please call patient to discuss

## 2018-03-01 RX ORDER — LEVOTHYROXINE SODIUM 100 UG/1
100 TABLET ORAL DAILY
Qty: 90 TABLET | Refills: 0 | Status: SHIPPED | OUTPATIENT
Start: 2018-03-01 | End: 2018-07-12

## 2018-03-01 NOTE — TELEPHONE ENCOUNTER
Called and informed pt.   Rx sent. Future lab order placed, lab appt scheduled.  Pt stated understanding and had no further questions.       Called pharmacy and informed of dose change and to inactivate 150mcg dose Rx.

## 2018-03-01 NOTE — TELEPHONE ENCOUNTER
Pt currently taking 150mcg of levothyroxine. Pt's labs were off for her thyroid and wants to know if her Rx should be adjusted. No result note. Routing to Dr. Briones to review and advise.

## 2018-03-29 DIAGNOSIS — E03.9 HYPOTHYROIDISM: ICD-10-CM

## 2018-03-29 LAB
T4 FREE SERPL-MCNC: 1.13 NG/DL (ref 0.76–1.46)
TSH SERPL DL<=0.005 MIU/L-ACNC: 0.47 MU/L (ref 0.4–4)

## 2018-03-29 PROCEDURE — 84439 ASSAY OF FREE THYROXINE: CPT | Performed by: OBSTETRICS & GYNECOLOGY

## 2018-03-29 PROCEDURE — 36415 COLL VENOUS BLD VENIPUNCTURE: CPT | Performed by: OBSTETRICS & GYNECOLOGY

## 2018-03-29 PROCEDURE — 84443 ASSAY THYROID STIM HORMONE: CPT | Performed by: OBSTETRICS & GYNECOLOGY

## 2018-04-09 ENCOUNTER — PRENATAL OFFICE VISIT (OUTPATIENT)
Dept: OBGYN | Facility: CLINIC | Age: 28
End: 2018-04-09
Payer: COMMERCIAL

## 2018-04-09 ENCOUNTER — RADIANT APPOINTMENT (OUTPATIENT)
Dept: ULTRASOUND IMAGING | Facility: CLINIC | Age: 28
End: 2018-04-09
Payer: COMMERCIAL

## 2018-04-09 VITALS — BODY MASS INDEX: 25.51 KG/M2 | DIASTOLIC BLOOD PRESSURE: 60 MMHG | WEIGHT: 135 LBS | SYSTOLIC BLOOD PRESSURE: 112 MMHG

## 2018-04-09 DIAGNOSIS — O09.92 SUPERVISION OF HIGH RISK PREGNANCY IN SECOND TRIMESTER: ICD-10-CM

## 2018-04-09 PROCEDURE — 76805 OB US >/= 14 WKS SNGL FETUS: CPT | Performed by: OBSTETRICS & GYNECOLOGY

## 2018-04-09 PROCEDURE — 99207 ZZC PRENATAL VISIT: CPT | Performed by: OBSTETRICS & GYNECOLOGY

## 2018-04-09 NOTE — PROGRESS NOTES
No loss of fluid/vaginal bleeding/regular contractions. + FM  -Reviewed normal anatomy US.  -Hypothyroidism. Repeat Labs next visit  - Labor precautions. F/U 4wk    Ingris Armstrong Masters, DO

## 2018-04-09 NOTE — MR AVS SNAPSHOT
After Visit Summary   4/9/2018    Dave Meyer    MRN: [id number]           Patient Information     Date Of Birth          1990        Visit Information        Provider Department      4/9/2018 8:40 AM Ingris Briones,  [facility] [place]        Today's Diagnoses     Supervision of high risk pregnancy in second trimester           Follow-ups after your visit        Follow-up notes from your care team     Return in about 4 weeks (around 5/7/2018).      Who to contact     If you have questions or need follow up information about today's clinic visit or your schedule please contact [facility] [place] directly at [phone].  Normal or non-critical lab and imaging results will be communicated to you by MyChart, letter or phone within 4 business days after the clinic has received the results. If you do not hear from us within 7 days, please contact the clinic through Appy Hotelhart or phone. If you have a critical or abnormal lab result, we will notify you by phone as soon as possible.  Submit refill requests through Spark Therapeutics or call your pharmacy and they will forward the refill request to us. Please allow 3 business days for your refill to be completed.          Additional Information About Your Visit        MyChart Information     Spark Therapeutics gives you secure access to your electronic health record. If you see a primary care provider, you can also send messages to your care team and make appointments. If you have questions, please call your primary care clinic.  If you do not have a primary care provider, please call [phone] and they will assist you.        Care EveryWhere ID     This is your Care EveryWhere ID. This could be used by other organizations to access your [facility] medical records  [id number]        Your Vitals Were     Last Period BMI (Body Mass Index)                11/15/2017 25.51 kg/m2           Blood Pressure from Last 3 Encounters:    04/09/18 112/60   02/26/18 118/56   01/22/18 106/54    Weight from Last 3 Encounters:   04/09/18 135 lb (61.2 kg)   02/26/18 125 lb (56.7 kg)   01/22/18 119 lb 6.4 oz (54.2 kg)              Today, you had the following     No orders found for display       Primary Care Provider Fax #    Physician No Ref-Primary [phone]       No address on file        Equal Access to Services     LULU FERRO : Hadii aad ku hadasho Soomaali, waaxda luqadaha, qaybta kaalmada adeegyada, waxay teressain haybetheln tova wrightbrentannetta jones . So [facility] [phone].    ATENCIÓN: Si habla español, tiene a cedeno disposición servicios gratuitos de asistencia lingüística. [facility] [phone].    We comply with applicable federal civil rights laws and [place] laws. We do not discriminate on the basis of race, color, national origin, age, disability, sex, sexual orientation, or gender identity.            Thank you!     Thank you for choosing [facility]  for your care. Our goal is always to provide you with excellent care. Hearing back from our patients is one way we can continue to improve our services. Please take a few minutes to complete the written survey that you may receive in the mail after your visit with us. Thank you!             Your Updated Medication List - Protect others around you: Learn how to safely use, store and throw away your medicines at www.disposemymeds.org.          This list is accurate as of 4/9/18  8:40 AM.  Always use your most recent med list.                   Brand Name Dispense Instructions for use Diagnosis    citalopram 20 MG tablet    celeXA    30 tablet    Take 1 tablet (20 mg) by mouth daily Start with half a tablet once a day for the first week.    Depression with anxiety       levothyroxine 100 MCG tablet    SYNTHROID/LEVOTHROID    90 tablet    Take 1 tablet (100 mcg) by mouth daily    Hypothyroidism       PRENATAL VITAMIN PO

## 2018-05-11 ENCOUNTER — PRENATAL OFFICE VISIT (OUTPATIENT)
Dept: OBGYN | Facility: CLINIC | Age: 28
End: 2018-05-11
Payer: COMMERCIAL

## 2018-05-11 VITALS — DIASTOLIC BLOOD PRESSURE: 62 MMHG | SYSTOLIC BLOOD PRESSURE: 116 MMHG | WEIGHT: 143 LBS | BODY MASS INDEX: 27.02 KG/M2

## 2018-05-11 DIAGNOSIS — E03.9 HYPOTHYROIDISM, UNSPECIFIED TYPE: Primary | ICD-10-CM

## 2018-05-11 DIAGNOSIS — F41.8 DEPRESSION WITH ANXIETY: ICD-10-CM

## 2018-05-11 DIAGNOSIS — O09.92 SUPERVISION OF HIGH RISK PREGNANCY IN SECOND TRIMESTER: ICD-10-CM

## 2018-05-11 PROCEDURE — 99207 ZZC PRENATAL VISIT: CPT | Performed by: OBSTETRICS & GYNECOLOGY

## 2018-05-11 NOTE — PROGRESS NOTES
No loss of fluid/vaginal bleeding/regular contractions. + FM  Mood wise things are good. Taking celexa.  -28wk labs and TFTs next visit  -PretermLabor precautions. F/U 2wk-3wk    Ingris Armstrong Masters, DO

## 2018-05-11 NOTE — MR AVS SNAPSHOT
After Visit Summary   5/11/2018    Dave Meyer    MRN: [id number]           Patient Information     Date Of Birth          1990        Visit Information        Provider Department      5/11/2018 8:40 AM sIngris,  [facility] [place]        Today's Diagnoses     Hypothyroidism, unspecified type    -  1    Supervision of high risk pregnancy in second trimester        Depression with anxiety           Follow-ups after your visit        Follow-up notes from your care team     Return in about 2 weeks (around 5/25/2018).      Who to contact     If you have questions or need follow up information about today's clinic visit or your schedule please contact [facility] [place] directly at [phone].  Normal or non-critical lab and imaging results will be communicated to you by AXADOhart, letter or phone within 4 business days after the clinic has received the results. If you do not hear from us within 7 days, please contact the clinic through AXADOhart or phone. If you have a critical or abnormal lab result, we will notify you by phone as soon as possible.  Submit refill requests through Biotectix or call your pharmacy and they will forward the refill request to us. Please allow 3 business days for your refill to be completed.          Additional Information About Your Visit        MyChart Information     Biotectix gives you secure access to your electronic health record. If you see a primary care provider, you can also send messages to your care team and make appointments. If you have questions, please call your primary care clinic.  If you do not have a primary care provider, please call [phone] and they will assist you.        Care EveryWhere ID     This is your Care EveryWhere ID. This could be used by other organizations to access your [facility] medical records  [id number]        Your Vitals Were     Last Period Breastfeeding? BMI (Body Mass  Index)             11/15/2017 No 27.02 kg/m2          Blood Pressure from Last 3 Encounters:   05/11/18 116/62   04/09/18 112/60   02/26/18 118/56    Weight from Last 3 Encounters:   05/11/18 143 lb (64.9 kg)   04/09/18 135 lb (61.2 kg)   02/26/18 125 lb (56.7 kg)              Today, you had the following     No orders found for display       Primary Care Provider Office Phone # Fax #    [facility] [place] [facility] [phone]       [facility] [address] [facility] [address]  [place] [address]        Equal Access to Services     LULU FERRO : Hadii neli Covington, higinio starks, yeison kaaledvin ocampo, alyssa whittington. So [facility] [phone].    ATENCIÓN: Si habla español, tiene a cedeno disposición servicios gratuitos de asistencia lingüística. Llame al [phone].    We comply with applicable federal civil rights laws and [place] laws. We do not discriminate on the basis of race, color, national origin, age, disability, sex, sexual orientation, or gender identity.            Thank you!     Thank you for choosing [facility] [place]  for your care. Our goal is always to provide you with excellent care. Hearing back from our patients is one way we can continue to improve our services. Please take a few minutes to complete the written survey that you may receive in the mail after your visit with us. Thank you!             Your Updated Medication List - Protect others around you: Learn how to safely use, store and throw away your medicines at www.disposemymeds.org.          This list is accurate as of 5/11/18  8:55 AM.  Always use your most recent med list.                   Brand Name Dispense Instructions for use Diagnosis    citalopram 20 MG tablet    celeXA    30 tablet    Take 1 tablet (20 mg) by mouth daily Start with half a tablet once a day for the first week.    Depression with anxiety       levothyroxine 100 MCG  tablet    SYNTHROID/LEVOTHROID    90 tablet    Take 1 tablet (100 mcg) by mouth daily    Hypothyroidism       PRENATAL VITAMIN PO

## 2018-05-31 ENCOUNTER — PRENATAL OFFICE VISIT (OUTPATIENT)
Dept: OBGYN | Facility: CLINIC | Age: 28
End: 2018-05-31
Payer: COMMERCIAL

## 2018-05-31 VITALS — SYSTOLIC BLOOD PRESSURE: 102 MMHG | DIASTOLIC BLOOD PRESSURE: 56 MMHG | WEIGHT: 146 LBS | BODY MASS INDEX: 27.59 KG/M2

## 2018-05-31 DIAGNOSIS — Z36.9 ENCOUNTER FOR ANTENATAL SCREENING OF MOTHER: Primary | ICD-10-CM

## 2018-05-31 DIAGNOSIS — O09.93 SUPERVISION OF HIGH RISK PREGNANCY IN THIRD TRIMESTER: ICD-10-CM

## 2018-05-31 DIAGNOSIS — Z23 NEED FOR TDAP VACCINATION: ICD-10-CM

## 2018-05-31 DIAGNOSIS — E03.9 HYPOTHYROIDISM, UNSPECIFIED TYPE: Primary | ICD-10-CM

## 2018-05-31 LAB
GLUCOSE 1H P 50 G GLC PO SERPL-MCNC: 99 MG/DL (ref 60–129)
HGB BLD-MCNC: 10.5 G/DL (ref 11.7–15.7)

## 2018-05-31 PROCEDURE — 99207 ZZC PRENATAL VISIT: CPT | Performed by: OBSTETRICS & GYNECOLOGY

## 2018-05-31 PROCEDURE — 36415 COLL VENOUS BLD VENIPUNCTURE: CPT | Performed by: OBSTETRICS & GYNECOLOGY

## 2018-05-31 PROCEDURE — 00000218 ZZHCL STATISTIC OBHBG - HEMOGLOBIN: Performed by: OBSTETRICS & GYNECOLOGY

## 2018-05-31 PROCEDURE — 90471 IMMUNIZATION ADMIN: CPT

## 2018-05-31 PROCEDURE — 82950 GLUCOSE TEST: CPT | Performed by: OBSTETRICS & GYNECOLOGY

## 2018-05-31 PROCEDURE — 90715 TDAP VACCINE 7 YRS/> IM: CPT

## 2018-05-31 PROCEDURE — 84443 ASSAY THYROID STIM HORMONE: CPT | Performed by: OBSTETRICS & GYNECOLOGY

## 2018-05-31 NOTE — MR AVS SNAPSHOT
After Visit Summary   5/31/2018    Dave Meyer    MRN: [id number]           Patient Information     Date Of Birth          1990        Visit Information        Provider Department      5/31/2018 11:30 AM Ingris Briones,  [facility] [place]        Today's Diagnoses     Hypothyroidism, unspecified type    -  1    Supervision of high risk pregnancy in third trimester           Follow-ups after your visit        Follow-up notes from your care team     Return in about 2 weeks (around 6/14/2018).      Who to contact     If you have questions or need follow up information about today's clinic visit or your schedule please contact [facility] [place] directly at [phone].  Normal or non-critical lab and imaging results will be communicated to you by Tadpoleshart, letter or phone within 4 business days after the clinic has received the results. If you do not hear from us within 7 days, please contact the clinic through Tadpoleshart or phone. If you have a critical or abnormal lab result, we will notify you by phone as soon as possible.  Submit refill requests through Intense or call your pharmacy and they will forward the refill request to us. Please allow 3 business days for your refill to be completed.          Additional Information About Your Visit        MyChart Information     Intense gives you secure access to your electronic health record. If you see a primary care provider, you can also send messages to your care team and make appointments. If you have questions, please call your primary care clinic.  If you do not have a primary care provider, please call [phone] and they will assist you.        Care EveryWhere ID     This is your Care EveryWhere ID. This could be used by other organizations to access your [facility] medical records  [id number]        Your Vitals Were     Last Period BMI (Body Mass Index)                11/15/2017 27.59 kg/m2            Blood Pressure from Last 3 Encounters:   05/31/18 102/56   05/11/18 116/62   04/09/18 112/60    Weight from Last 3 Encounters:   05/31/18 146 lb (66.2 kg)   05/11/18 143 lb (64.9 kg)   04/09/18 135 lb (61.2 kg)              We Performed the Following     TSH with free T4 reflex        Primary Care Provider Office Phone # Fax #    [facility] [place] [facility] [phone]       [facility] [address]  [facility] [address]  [facility] [address]        Equal Access to Services     LULU FERRO : Hadii neli hernandezo Sorosalee, waaxda luthiago, qaybta kaalmada adesissy, alyssa jones . So [facility] [phone].    ATENCIÓN: Si habla español, tiene a cedeno disposición servicios gratuitos de asistencia lingüística. [facility] [place] [phone].    We comply with applicable federal civil rights laws and [place] laws. We do not discriminate on the basis of race, color, national origin, age, disability, sex, sexual orientation, or gender identity.            Thank you!     Thank you for choosing [facility]  for your care. Our goal is always to provide you with excellent care. Hearing back from our patients is one way we can continue to improve our services. Please take a few minutes to complete the written survey that you may receive in the mail after your visit with us. Thank you!             Your Updated Medication List - Protect others around you: Learn how to safely use, store and throw away your medicines at www.disposemymeds.org.          This list is accurate as of 5/31/18 12:42 PM.  Always use your most recent med list.                   Brand Name Dispense Instructions for use Diagnosis    citalopram 20 MG tablet    celeXA    30 tablet    Take 1 tablet (20 mg) by mouth daily Start with half a tablet once a day for the first week.    Depression with anxiety       levothyroxine 100 MCG tablet    SYNTHROID/LEVOTHROID    90 tablet    Take  1 tablet (100 mcg) by mouth daily    Hypothyroidism       PRENATAL VITAMIN PO

## 2018-05-31 NOTE — PROGRESS NOTES
No loss of fluid/vaginal bleeding/regular contractions. + FM  -Hypothyroidism. Check TFTs  -28wk labs, Tdap offered.  - Labor precautions. F/U 2wk    Ingris Armstrong Masters, DO

## 2018-05-31 NOTE — PROGRESS NOTES
Syphilis is a sexually transmitted disease that can cause birth defects in the babies of untreated mothers. Every pregnant patient is tested for syphilis early in each pregnancy as part of the routine lab work. The [place] Department of [facility] has seen an increase in the rate of syphilis in [place]. [facility] now recommends testing for syphilis 3 times during a pregnancy, the new prenatal visit, 28 weeks and when admitted for delivery. Patient declines lab testing for syphilis.

## 2018-06-01 LAB — TSH SERPL DL<=0.005 MIU/L-ACNC: 1.59 MU/L (ref 0.4–4)

## 2018-06-11 ENCOUNTER — PRENATAL OFFICE VISIT (OUTPATIENT)
Dept: OBGYN | Facility: CLINIC | Age: 28
End: 2018-06-11
Payer: COMMERCIAL

## 2018-06-11 VITALS — BODY MASS INDEX: 27.89 KG/M2 | WEIGHT: 147.6 LBS | SYSTOLIC BLOOD PRESSURE: 102 MMHG | DIASTOLIC BLOOD PRESSURE: 60 MMHG

## 2018-06-11 DIAGNOSIS — O09.93 SUPERVISION OF HIGH RISK PREGNANCY IN THIRD TRIMESTER: Primary | ICD-10-CM

## 2018-06-11 DIAGNOSIS — Z91.030 ALLERGY TO BEE STING: ICD-10-CM

## 2018-06-11 PROCEDURE — 99207 ZZC PRENATAL VISIT: CPT | Performed by: OBSTETRICS & GYNECOLOGY

## 2018-06-11 RX ORDER — EPINEPHRINE 0.3 MG/.3ML
0.3 INJECTION SUBCUTANEOUS PRN
Qty: 2 ML | Refills: 3 | Status: SHIPPED | OUTPATIENT
Start: 2018-06-11 | End: 2022-06-15

## 2018-06-11 NOTE — PROGRESS NOTES
No loss of fluid/vaginal bleeding/regular contractions. + FM  Would like refill on epi pen for bee stings.   - Labor precautions. F/U 2wk    Ingris Armstrong Masters, DO

## 2018-06-11 NOTE — MR AVS SNAPSHOT
After Visit Summary   6/11/2018    Dave Meyer    MRN: [id number]           Patient Information     Date Of Birth          1990        Visit Information        Provider Department      6/11/2018 9:50 AM s, Ingris Armstrong,  [facility] [place]        Today's Diagnoses     Supervision of high risk pregnancy in third trimester    -  1    Allergy to bee sting           Follow-ups after your visit        Follow-up notes from your care team     Return in about 2 weeks (around 6/25/2018).      Who to contact     If you have questions or need follow up information about today's clinic visit or your schedule please contact [facility] directly at [phone].  Normal or non-critical lab and imaging results will be communicated to you by Vengo Labshart, letter or phone within 4 business days after the clinic has received the results. If you do not hear from us within 7 days, please contact the clinic through Vengo Labshart or phone. If you have a critical or abnormal lab result, we will notify you by phone as soon as possible.  Submit refill requests through Soraa or call your pharmacy and they will forward the refill request to us. Please allow 3 business days for your refill to be completed.          Additional Information About Your Visit        MyChart Information     Soraa gives you secure access to your electronic health record. If you see a primary care provider, you can also send messages to your care team and make appointments. If you have questions, please call your primary care clinic.  If you do not have a primary care provider, please call [phone] and they will assist you.        Care EveryWhere ID     This is your Care EveryWhere ID. This could be used by other organizations to access your [place] medical records  [id number]        Your Vitals Were     Last Period BMI (Body Mass Index)                11/15/2017 27.89 kg/m2           Blood  Pressure from Last 3 Encounters:   06/11/18 102/60   05/31/18 102/56   05/11/18 116/62    Weight from Last 3 Encounters:   06/11/18 147 lb 9.6 oz (67 kg)   05/31/18 146 lb (66.2 kg)   05/11/18 143 lb (64.9 kg)              Today, you had the following     No orders found for display         Today's Medication Changes          These changes are accurate as of 6/11/18 12:45 PM.  If you have any questions, ask your nurse or doctor.               Start taking these medicines.        Dose/Directions    EPINEPHrine 0.3 MG/0.3ML injection 2-pack   Commonly known as:  EPIPEN/ADRENACLICK/or ANY BX GENERIC EQUIV   Used for:  Allergy to bee sting   Started by:  Ingris Briones,         Dose:  0.3 mg   Inject 0.3 mLs (0.3 mg) into the muscle as needed for anaphylaxis   Quantity:  2 mL   Refills:  3            Where to get your medicines      These medications were sent to [facility] [address] [place] [address] [place] [address] [address] [facility]  [address] [place] [address]     Phone:  [phone]     EPINEPHrine 0.3 MG/0.3ML injection 2-pack                Primary Care Provider Office Phone # Fax #    [facility] [phone]       [place] [address] [address] [facility] [address]  [facility] [address]        Equal Access to Services     LULU FERRO AH: Hadii aad ku hadasho Soomaali, waaxda luqadaha, qaybta kaalmada adeegyada, waxay lizzie whittington. So [facility] [phone].    ATENCIÓN: Si habla español, tiene a cedeno disposición servicios gratuitos de asistencia lingüística. Llame al [phone].    We comply with applicable federal civil rights laws and [place] laws. We do not discriminate on the basis of race, color, national origin, age, disability, sex, sexual orientation, or gender identity.            Thank you!     Thank you for choosing [facility] [place]  for your care. Our goal is always to provide  you with excellent care. Hearing back from our patients is one way we can continue to improve our services. Please take a few minutes to complete the written survey that you may receive in the mail after your visit with us. Thank you!             Your Updated Medication List - Protect others around you: Learn how to safely use, store and throw away your medicines at www.disposemymeds.org.          This list is accurate as of 6/11/18 12:45 PM.  Always use your most recent med list.                   Brand Name Dispense Instructions for use Diagnosis    citalopram 20 MG tablet    celeXA    30 tablet    Take 1 tablet (20 mg) by mouth daily Start with half a tablet once a day for the first week.    Depression with anxiety       EPINEPHrine 0.3 MG/0.3ML injection 2-pack    EPIPEN/ADRENACLICK/or ANY BX GENERIC EQUIV    2 mL    Inject 0.3 mLs (0.3 mg) into the muscle as needed for anaphylaxis    Allergy to bee sting       levothyroxine 100 MCG tablet    SYNTHROID/LEVOTHROID    90 tablet    Take 1 tablet (100 mcg) by mouth daily    Hypothyroidism       PRENATAL VITAMIN PO

## 2018-06-25 ENCOUNTER — PRENATAL OFFICE VISIT (OUTPATIENT)
Dept: OBGYN | Facility: CLINIC | Age: 28
End: 2018-06-25
Payer: COMMERCIAL

## 2018-06-25 VITALS — DIASTOLIC BLOOD PRESSURE: 56 MMHG | BODY MASS INDEX: 28.76 KG/M2 | WEIGHT: 152.2 LBS | SYSTOLIC BLOOD PRESSURE: 98 MMHG

## 2018-06-25 DIAGNOSIS — E03.9 HYPOTHYROIDISM, UNSPECIFIED TYPE: Primary | ICD-10-CM

## 2018-06-25 DIAGNOSIS — O09.93 SUPERVISION OF HIGH RISK PREGNANCY IN THIRD TRIMESTER: ICD-10-CM

## 2018-06-25 PROCEDURE — 99207 ZZC PRENATAL VISIT: CPT | Performed by: OBSTETRICS & GYNECOLOGY

## 2018-06-25 NOTE — PROGRESS NOTES
No loss of fluid/vaginal bleeding/regular contractions. + FM  - Labor precautions. F/U 2wk.     Ingris Armstrong Masters, DO

## 2018-06-25 NOTE — MR AVS SNAPSHOT
After Visit Summary   6/25/2018    Dave Meyer    MRN: [id number]           Patient Information     Date Of Birth          1990        Visit Information        Provider Department      6/25/2018 11:00 AM Ingris Briones,  [facility] [place]        Today's Diagnoses     Hypothyroidism, unspecified type    -  1    Supervision of high risk pregnancy in third trimester           Follow-ups after your visit        Follow-up notes from your care team     Return in about 2 weeks (around 7/9/2018).      Who to contact     If you have questions or need follow up information about today's clinic visit or your schedule please contact [facility] [place] directly at [phone].  Normal or non-critical lab and imaging results will be communicated to you by Metaspace Studioshart, letter or phone within 4 business days after the clinic has received the results. If you do not hear from us within 7 days, please contact the clinic through Metaspace Studioshart or phone. If you have a critical or abnormal lab result, we will notify you by phone as soon as possible.  Submit refill requests through [employer] or call your pharmacy and they will forward the refill request to us. Please allow 3 business days for your refill to be completed.          Additional Information About Your Visit        MyChart Information     [employer] gives you secure access to your electronic health record. If you see a primary care provider, you can also send messages to your care team and make appointments. If you have questions, please call your primary care clinic.  If you do not have a primary care provider, please call [phone] and they will assist you.        Care EveryWhere ID     This is your Care EveryWhere ID. This could be used by other organizations to access your [place] medical records  [id number]        Your Vitals Were     Last Period BMI (Body Mass Index)                11/15/2017 28.76 kg/m2            Blood Pressure from Last 3 Encounters:   06/25/18 98/56   06/11/18 102/60   05/31/18 102/56    Weight from Last 3 Encounters:   06/25/18 152 lb 3.2 oz (69 kg)   06/11/18 147 lb 9.6 oz (67 kg)   05/31/18 146 lb (66.2 kg)              Today, you had the following     No orders found for display       Primary Care Provider Office Phone # Fax #    [facility] [phone]       [facility] [address]  [facility] [address]  [facility] [address]        Equal Access to Services     LULU FERRO : Hadii neli hernandezo [place], waaxda luqadaha, qaybta kaalmada adesissy, alyssa whittington. So [facility] [phone].    ATENCIÓN: Si habla español, tiene a cedeno disposición servicios gratuitos de asistencia lingüística. Ll[facility] [phone].    We comply with applicable federal civil rights laws and [place] laws. We do not discriminate on the basis of race, color, national origin, age, disability, sex, sexual orientation, or gender identity.            Thank you!     Thank you for choosing [facility]  for your care. Our goal is always to provide you with excellent care. Hearing back from our patients is one way we can continue to improve our services. Please take a few minutes to complete the written survey that you may receive in the mail after your visit with us. Thank you!             Your Updated Medication List - Protect others around you: Learn how to safely use, store and throw away your medicines at www.disposemymeds.org.          This list is accurate as of 6/25/18 11:17 AM.  Always use your most recent med list.                   Brand Name Dispense Instructions for use Diagnosis    citalopram 20 MG tablet    celeXA    30 tablet    Take 1 tablet (20 mg) by mouth daily Start with half a tablet once a day for the first week.    Depression with anxiety       EPINEPHrine 0.3 MG/0.3ML injection 2-pack     EPIPEN/ADRENACLICK/or ANY BX GENERIC EQUIV    2 mL    Inject 0.3 mLs (0.3 mg) into the muscle as needed for anaphylaxis    Allergy to bee sting       levothyroxine 100 MCG tablet    SYNTHROID/LEVOTHROID    90 tablet    Take 1 tablet (100 mcg) by mouth daily    Hypothyroidism       PRENATAL VITAMIN PO

## 2018-07-12 ENCOUNTER — PRENATAL OFFICE VISIT (OUTPATIENT)
Dept: OBGYN | Facility: CLINIC | Age: 28
End: 2018-07-12
Payer: COMMERCIAL

## 2018-07-12 VITALS — WEIGHT: 154.2 LBS | BODY MASS INDEX: 29.14 KG/M2 | DIASTOLIC BLOOD PRESSURE: 60 MMHG | SYSTOLIC BLOOD PRESSURE: 108 MMHG

## 2018-07-12 DIAGNOSIS — O09.93 SUPERVISION OF HIGH RISK PREGNANCY IN THIRD TRIMESTER: Primary | ICD-10-CM

## 2018-07-12 DIAGNOSIS — E03.8 OTHER SPECIFIED HYPOTHYROIDISM: ICD-10-CM

## 2018-07-12 PROCEDURE — 99207 ZZC PRENATAL VISIT: CPT | Performed by: OBSTETRICS & GYNECOLOGY

## 2018-07-12 RX ORDER — LEVOTHYROXINE SODIUM 50 UG/1
100 TABLET ORAL DAILY
Qty: 90 TABLET | Refills: 3 | Status: SHIPPED | OUTPATIENT
Start: 2018-07-12 | End: 2021-02-08 | Stop reason: DRUGHIGH

## 2018-07-12 NOTE — MR AVS SNAPSHOT
After Visit Summary   7/12/2018    Dave Meyer    MRN: [id number]           Patient Information     Date Of Birth          1990        Visit Information        Provider Department      7/12/2018 11:30 AM Ingris Briones,  [facility] [place]        Today's Diagnoses     Supervision of high risk pregnancy in third trimester    -  1    Other specified hypothyroidism           Follow-ups after your visit        Follow-up notes from your care team     Return in about 2 weeks (around 7/26/2018).      Who to contact     If you have questions or need follow up information about today's clinic visit or your schedule please contact [facility] [place] directly at [phone].  Normal or non-critical lab and imaging results will be communicated to you by MyChart, letter or phone within 4 business days after the clinic has received the results. If you do not hear from us within 7 days, please contact the clinic through Livelenshart or phone. If you have a critical or abnormal lab result, we will notify you by phone as soon as possible.  Submit refill requests through Your Practical Solutions or call your pharmacy and they will forward the refill request to us. Please allow 3 business days for your refill to be completed.          Additional Information About Your Visit        MyChart Information     [employer] gives you secure access to your electronic health record. If you see a primary care provider, you can also send messages to your care team and make appointments. If you have questions, please call your primary care clinic.  If you do not have a primary care provider, please call [phone] and they will assist you.        Care EveryWhere ID     This is your Care EveryWhere ID. This could be used by other organizations to access your [place] medical records  [id number]        Your Vitals Were     Last Period BMI (Body Mass Index)                11/15/2017 29.14 kg/m2            Blood Pressure from Last 3 Encounters:   07/12/18 108/60   06/25/18 98/56   06/11/18 102/60    Weight from Last 3 Encounters:   07/12/18 154 lb 3.2 oz (69.9 kg)   06/25/18 152 lb 3.2 oz (69 kg)   06/11/18 147 lb 9.6 oz (67 kg)              Today, you had the following     No orders found for display         Today's Medication Changes          These changes are accurate as of 7/12/18 11:54 AM.  If you have any questions, ask your nurse or doctor.               These medicines have changed or have updated prescriptions.        Dose/Directions    levothyroxine 50 MCG tablet   Commonly known as:  SYNTHROID/LEVOTHROID   This may have changed:  medication strength   Used for:  Other specified hypothyroidism   Changed by:  Ingris Briones,         Dose:  100 mcg   Take 2 tablets (100 mcg) by mouth daily   Quantity:  90 tablet   Refills:  3            Where to get your medicines      These medications were sent to [facility] [place] [address] [address] [facility] [place] [address]     Phone:  [phone]     levothyroxine 50 MCG tablet                Primary Care Provider Office Phone # Fax #    Ingris Briones [phone] [address] [facility] [address]  [facility] [address]        Equal Access to Services     [facility]: Hadii aad ku hadasho Soomaali, waaxda luqadaha, qaybta kaalmada aderhondayada, alyssa jones . So [facility] [phone].    ATENCIÓN: Si habla español, tiene a cedeno disposición servicios gratuitos de asistencia lingüística. Llame al [phone].    We comply with applicable federal civil rights laws and [place] laws. We do not discriminate on the basis of race, color, national origin, age, disability, sex, sexual orientation, or gender identity.            Thank you!     Thank you for choosing [facility] [place]  for your care. Our goal is always to provide you with excellent  care. Hearing back from our patients is one way we can continue to improve our services. Please take a few minutes to complete the written survey that you may receive in the mail after your visit with us. Thank you!             Your Updated Medication List - Protect others around you: Learn how to safely use, store and throw away your medicines at www.disposemymeds.org.          This list is accurate as of 7/12/18 11:54 AM.  Always use your most recent med list.                   Brand Name Dispense Instructions for use Diagnosis    citalopram 20 MG tablet    celeXA    30 tablet    Take 1 tablet (20 mg) by mouth daily Start with half a tablet once a day for the first week.    Depression with anxiety       EPINEPHrine 0.3 MG/0.3ML injection 2-pack    EPIPEN/ADRENACLICK/or ANY BX GENERIC EQUIV    2 mL    Inject 0.3 mLs (0.3 mg) into the muscle as needed for anaphylaxis    Allergy to bee sting       levothyroxine 50 MCG tablet    SYNTHROID/LEVOTHROID    90 tablet    Take 2 tablets (100 mcg) by mouth daily    Other specified hypothyroidism       PRENATAL VITAMIN PO

## 2018-07-30 ENCOUNTER — PRENATAL OFFICE VISIT (OUTPATIENT)
Dept: OBGYN | Facility: CLINIC | Age: 28
End: 2018-07-30
Payer: COMMERCIAL

## 2018-07-30 VITALS — SYSTOLIC BLOOD PRESSURE: 92 MMHG | BODY MASS INDEX: 29.89 KG/M2 | WEIGHT: 158.2 LBS | DIASTOLIC BLOOD PRESSURE: 58 MMHG

## 2018-07-30 DIAGNOSIS — E03.8 OTHER SPECIFIED HYPOTHYROIDISM: ICD-10-CM

## 2018-07-30 DIAGNOSIS — O09.93 SUPERVISION OF HIGH RISK PREGNANCY IN THIRD TRIMESTER: Primary | ICD-10-CM

## 2018-07-30 DIAGNOSIS — Z36.85 SCREENING, ANTENATAL, FOR STREPTOCOCCUS B: ICD-10-CM

## 2018-07-30 PROCEDURE — 99207 ZZC PRENATAL VISIT: CPT | Performed by: OBSTETRICS & GYNECOLOGY

## 2018-07-30 PROCEDURE — 87653 STREP B DNA AMP PROBE: CPT | Performed by: OBSTETRICS & GYNECOLOGY

## 2018-07-30 PROCEDURE — 84443 ASSAY THYROID STIM HORMONE: CPT | Performed by: OBSTETRICS & GYNECOLOGY

## 2018-07-30 PROCEDURE — 36415 COLL VENOUS BLD VENIPUNCTURE: CPT | Performed by: OBSTETRICS & GYNECOLOGY

## 2018-07-30 NOTE — PROGRESS NOTES
No loss of fluid/vaginal bleeding/regular contractions. + FM  -GBS obtained  - Labor precautions. F/U 1wk    Ingris Armstrong Masters, DO

## 2018-07-30 NOTE — MR AVS SNAPSHOT
After Visit Summary   2018    Dave Meyer    MRN: [id number]           Patient Information     Date Of Birth          1990        Visit Information        Provider Department      2018 10:50 AM Ingris Briones,  [facility] [place]        Today's Diagnoses     Supervision of high risk pregnancy in third trimester    -  1    Screening, , for Streptococcus B        Other specified hypothyroidism           Follow-ups after your visit        Follow-up notes from your care team     Return in about 1 week (around 2018).      Who to contact     If you have questions or need follow up information about today's clinic visit or your schedule please contact [facility] directly at [phone].  Normal or non-critical lab and imaging results will be communicated to you by Consumer Physicshart, letter or phone within 4 business days after the clinic has received the results. If you do not hear from us within 7 days, please contact the clinic through PicAppt or phone. If you have a critical or abnormal lab result, we will notify you by phone as soon as possible.  Submit refill requests through Offermatic or call your pharmacy and they will forward the refill request to us. Please allow 3 business days for your refill to be completed.          Additional Information About Your Visit        MyChart Information     Offermatic gives you secure access to your electronic health record. If you see a primary care provider, you can also send messages to your care team and make appointments. If you have questions, please call your primary care clinic.  If you do not have a primary care provider, please call [phone] and they will assist you.        Care EveryWhere ID     This is your Care EveryWhere ID. This could be used by other organizations to access your [place] medical records  [id number]        Your Vitals Were     Last Period BMI (Body Mass  Index)                11/15/2017 29.89 kg/m2           Blood Pressure from Last 3 Encounters:   07/30/18 92/58   07/12/18 108/60   06/25/18 98/56    Weight from Last 3 Encounters:   07/30/18 158 lb 3.2 oz (71.8 kg)   07/12/18 154 lb 3.2 oz (69.9 kg)   06/25/18 152 lb 3.2 oz (69 kg)              We Performed the Following     Strep, Group B by PCR     TSH with free T4 reflex        Primary Care Provider Office Phone # Fax #    Ingrisberna Armstrong Masters, [phone] [address] [facility] [address]  [facility] [address]        Equal Access to Services     LULU FERRO : Hadii neli [place], wacande starks, qaybta kaalmada damaris, alyssa whittington. So [facility] [phone].    ATENCIÓN: Si habla español, tiene a cedeno disposición servicios gratuitos de asistencia lingüística. Llame al [phone].    We comply with applicable federal civil rights laws and [place] laws. We do not discriminate on the basis of race, color, national origin, age, disability, sex, sexual orientation, or gender identity.            Thank you!     Thank you for choosing [facility] [place]  for your care. Our goal is always to provide you with excellent care. Hearing back from our patients is one way we can continue to improve our services. Please take a few minutes to complete the written survey that you may receive in the mail after your visit with us. Thank you!             Your Updated Medication List - Protect others around you: Learn how to safely use, store and throw away your medicines at www.disposemymeds.org.          This list is accurate as of 7/30/18 11:13 AM.  Always use your most recent med list.                   Brand Name Dispense Instructions for use Diagnosis    citalopram 20 MG tablet    celeXA    30 tablet    Take 1 tablet (20 mg) by mouth daily Start with half a tablet once a day for the first week.    Depression with anxiety       EPINEPHrine 0.3 MG/0.3ML injection  2-pack    EPIPEN/ADRENACLICK/or ANY BX GENERIC EQUIV    2 mL    Inject 0.3 mLs (0.3 mg) into the muscle as needed for anaphylaxis    Allergy to bee sting       levothyroxine 50 MCG tablet    SYNTHROID/LEVOTHROID    90 tablet    Take 2 tablets (100 mcg) by mouth daily    Other specified hypothyroidism       PRENATAL VITAMIN PO

## 2018-07-31 LAB
GP B STREP DNA SPEC QL NAA+PROBE: NEGATIVE
SPECIMEN SOURCE: NORMAL
TSH SERPL DL<=0.005 MIU/L-ACNC: 0.85 MU/L (ref 0.4–4)

## 2018-08-06 ENCOUNTER — PRENATAL OFFICE VISIT (OUTPATIENT)
Dept: OBGYN | Facility: CLINIC | Age: 28
End: 2018-08-06
Payer: COMMERCIAL

## 2018-08-06 VITALS — DIASTOLIC BLOOD PRESSURE: 52 MMHG | WEIGHT: 157.8 LBS | SYSTOLIC BLOOD PRESSURE: 100 MMHG | BODY MASS INDEX: 29.82 KG/M2

## 2018-08-06 DIAGNOSIS — O09.93 SUPERVISION OF HIGH RISK PREGNANCY IN THIRD TRIMESTER: Primary | ICD-10-CM

## 2018-08-06 DIAGNOSIS — E03.8 OTHER SPECIFIED HYPOTHYROIDISM: ICD-10-CM

## 2018-08-06 PROCEDURE — 99207 ZZC PRENATAL VISIT: CPT | Performed by: OBSTETRICS & GYNECOLOGY

## 2018-08-06 NOTE — PROGRESS NOTES
No loss of fluid/vaginal bleeding/regular contractions. + FM  -GBS neg  -Labor precautions. F/U 1wk    Ingris Armstrong Masters, DO

## 2018-08-06 NOTE — MR AVS SNAPSHOT
After Visit Summary   8/6/2018    Dave Meyer    MRN: [id number]           Patient Information     Date Of Birth          1990        Visit Information        Provider Department      8/6/2018 9:50 AM sIngris,  [facility] [place]        Today's Diagnoses     Supervision of high risk pregnancy in third trimester    -  1    Other specified hypothyroidism           Follow-ups after your visit        Follow-up notes from your care team     Return in about 1 week (around 8/13/2018).      Who to contact     If you have questions or need follow up information about today's clinic visit or your schedule please contact [facility] [place] directly at [phone].  Normal or non-critical lab and imaging results will be communicated to you by MyChart, letter or phone within 4 business days after the clinic has received the results. If you do not hear from us within 7 days, please contact the clinic through Milkhart or phone. If you have a critical or abnormal lab result, we will notify you by phone as soon as possible.  Submit refill requests through JFrog or call your pharmacy and they will forward the refill request to us. Please allow 3 business days for your refill to be completed.          Additional Information About Your Visit        MyChart Information     JFrog gives you secure access to your electronic health record. If you see a primary care provider, you can also send messages to your care team and make appointments. If you have questions, please call your primary care clinic.  If you do not have a primary care provider, please call [phone] and they will assist you.        Care EveryWhere ID     This is your Care EveryWhere ID. This could be used by other organizations to access your [place] medical records  [id number]        Your Vitals Were     Last Period BMI (Body Mass Index)                11/15/2017 29.82 kg/m2            Blood Pressure from Last 3 Encounters:   08/06/18 100/52   07/30/18 92/58   07/12/18 108/60    Weight from Last 3 Encounters:   08/06/18 157 lb 12.8 oz (71.6 kg)   07/30/18 158 lb 3.2 oz (71.8 kg)   07/12/18 154 lb 3.2 oz (69.9 kg)              Today, you had the following     No orders found for display       Primary Care Provider Office Phone # Fax #    Ingris Armstrong Masters, [phone] [address] [facility] [address]  [facility] [address]        Equal Access to Services     [facility]: Hadii aad alexander hadana maríao Sorosalee, waaxda luqadaha, qaybta kaalmada aderhondayada, alyssa jones . So [facility] [phone].    ATENCIÓN: Si habla español, tiene a cedeno disposición servicios gratuitos de asistencia lingüística. Ll[facility] Medical [facility] [phone].    We comply with applicable federal civil rights laws and [place] laws. We do not discriminate on the basis of race, color, national origin, age, disability, sex, sexual orientation, or gender identity.            Thank you!     Thank you for choosing [facility]  for your care. Our goal is always to provide you with excellent care. Hearing back from our patients is one way we can continue to improve our services. Please take a few minutes to complete the written survey that you may receive in the mail after your visit with us. Thank you!             Your Updated Medication List - Protect others around you: Learn how to safely use, store and throw away your medicines at www.disposemymeds.org.          This list is accurate as of 8/6/18 10:25 AM.  Always use your most recent med list.                   Brand Name Dispense Instructions for use Diagnosis    citalopram 20 MG tablet    celeXA    30 tablet    Take 1 tablet (20 mg) by mouth daily Start with half a tablet once a day for the first week.    Depression with anxiety       EPINEPHrine 0.3 MG/0.3ML injection 2-pack    EPIPEN/ADRENACLICK/or ANY BX GENERIC EQUIV    2 mL    Inject 0.3  mLs (0.3 mg) into the muscle as needed for anaphylaxis    Allergy to bee sting       levothyroxine 50 MCG tablet    SYNTHROID/LEVOTHROID    90 tablet    Take 2 tablets (100 mcg) by mouth daily    Other specified hypothyroidism       PRENATAL VITAMIN PO

## 2018-08-13 ENCOUNTER — PRENATAL OFFICE VISIT (OUTPATIENT)
Dept: OBGYN | Facility: CLINIC | Age: 28
End: 2018-08-13
Payer: COMMERCIAL

## 2018-08-13 VITALS — SYSTOLIC BLOOD PRESSURE: 100 MMHG | BODY MASS INDEX: 30.42 KG/M2 | WEIGHT: 161 LBS | DIASTOLIC BLOOD PRESSURE: 58 MMHG

## 2018-08-13 DIAGNOSIS — O09.93 SUPERVISION OF HIGH RISK PREGNANCY IN THIRD TRIMESTER: ICD-10-CM

## 2018-08-13 PROCEDURE — 99207 ZZC PRENATAL VISIT: CPT | Performed by: OBSTETRICS & GYNECOLOGY

## 2018-08-13 RX ORDER — CITALOPRAM HYDROBROMIDE 40 MG/1
TABLET ORAL
Refills: 3 | COMMUNITY
Start: 2018-08-05 | End: 2019-01-14

## 2018-08-13 NOTE — PROGRESS NOTES
No loss of fluid/vaginal bleeding/regular contractions. + FM  -SVE 1/40/-2, soft, posterior  -Labor precautions. F/U 1wk    Ingris Armstrong Masters, DO

## 2018-08-13 NOTE — MR AVS SNAPSHOT
After Visit Summary   8/13/2018    Dave Meyer    MRN: [id number]           Patient Information     Date Of Birth          1990        Visit Information        Provider Department      8/13/2018 9:10 AM Ingris Briones,  [facility] [place]        Today's Diagnoses     Supervision of high risk pregnancy in third trimester           Follow-ups after your visit        Follow-up notes from your care team     Return in about 1 week (around 8/20/2018).      Who to contact     If you have questions or need follow up information about today's clinic visit or your schedule please contact [facility] [place] directly at [phone].  Normal or non-critical lab and imaging results will be communicated to you by MyChart, letter or phone within 4 business days after the clinic has received the results. If you do not hear from us within 7 days, please contact the clinic through [employer] or phone. If you have a critical or abnormal lab result, we will notify you by phone as soon as possible.  Submit refill requests through [employer] or call your pharmacy and they will forward the refill request to us. Please allow 3 business days for your refill to be completed.          Additional Information About Your Visit        MyChart Information     [employer] gives you secure access to your electronic health record. If you see a primary care provider, you can also send messages to your care team and make appointments. If you have questions, please call your primary care clinic.  If you do not have a primary care provider, please call [phone] and they will assist you.        Care EveryWhere ID     This is your Care EveryWhere ID. This could be used by other organizations to access your [place] medical records  [id number]        Your Vitals Were     Last Period BMI (Body Mass Index)                11/15/2017 30.42 kg/m2           Blood Pressure from Last 3 Encounters:    08/13/18 100/58   08/06/18 100/52   07/30/18 92/58    Weight from Last 3 Encounters:   08/13/18 161 lb (73 kg)   08/06/18 157 lb 12.8 oz (71.6 kg)   07/30/18 158 lb 3.2 oz (71.8 kg)              Today, you had the following     No orders found for display         Today's Medication Changes          These changes are accurate as of 8/13/18  9:52 AM.  If you have any questions, ask your nurse or doctor.               These medicines have changed or have updated prescriptions.        Dose/Directions    citalopram 40 MG tablet   Commonly known as:  celeXA   This may have changed:  Another medication with the same name was removed. Continue taking this medication, and follow the directions you see here.        TK 1 T PO QD   Refills:  3                Primary Care Provider Office Phone # Fax #    Ingris Armstrong Masters, [phone] [address] [facility] [address]  [facility] [address]        Equal Access to Services     [facility] Center[facility] : Hadii aad ku hadasho Soomaali, waaxda luqadaha, qaybta kaalmada adeegyada, waxay teressain hayfili jones . So [facility] [phone].    ATENCIÓN: Si habla español, tiene a cedeno disposición servicios gratuitos de asistencia lingüística. Llame al [phone].    We comply with applicable federal civil rights laws and [place] laws. We do not discriminate on the basis of race, color, national origin, age, disability, sex, sexual orientation, or gender identity.            Thank you!     Thank you for choosing [facility] [place]  for your care. Our goal is always to provide you with excellent care. Hearing back from our patients is one way we can continue to improve our services. Please take a few minutes to complete the written survey that you may receive in the mail after your visit with us. Thank you!             Your Updated Medication List - Protect others around you: Learn how to safely use, store and throw away your medicines at www.disposemymeds.org.           This list is accurate as of 8/13/18  9:52 AM.  Always use your most recent med list.                   Brand Name Dispense Instructions for use Diagnosis    citalopram 40 MG tablet    celeXA     TK 1 T PO QD        EPINEPHrine 0.3 MG/0.3ML injection 2-pack    EPIPEN/ADRENACLICK/or ANY BX GENERIC EQUIV    2 mL    Inject 0.3 mLs (0.3 mg) into the muscle as needed for anaphylaxis    Allergy to bee sting       levothyroxine 50 MCG tablet    SYNTHROID/LEVOTHROID    90 tablet    Take 2 tablets (100 mcg) by mouth daily    Other specified hypothyroidism       PRENATAL VITAMIN PO

## 2018-08-18 ENCOUNTER — ANESTHESIA EVENT (OUTPATIENT)
Dept: OBGYN | Facility: CLINIC | Age: 28
End: 2018-08-18
Payer: COMMERCIAL

## 2018-08-18 ENCOUNTER — NURSE TRIAGE (OUTPATIENT)
Dept: NURSING | Facility: CLINIC | Age: 28
End: 2018-08-18

## 2018-08-18 ENCOUNTER — ANESTHESIA (OUTPATIENT)
Dept: OBGYN | Facility: CLINIC | Age: 28
End: 2018-08-18
Payer: COMMERCIAL

## 2018-08-18 ENCOUNTER — HOSPITAL ENCOUNTER (INPATIENT)
Facility: CLINIC | Age: 28
LOS: 2 days | Discharge: HOME | End: 2018-08-20
Attending: OBSTETRICS & GYNECOLOGY | Admitting: OBSTETRICS & GYNECOLOGY
Payer: COMMERCIAL

## 2018-08-18 PROBLEM — Z34.90 PREGNANCY: Status: ACTIVE | Noted: 2018-08-18

## 2018-08-18 LAB
ABO + RH BLD: NORMAL
ABO + RH BLD: NORMAL
BASOPHILS # BLD AUTO: 0 10E9/L (ref 0–0.2)
BASOPHILS NFR BLD AUTO: 0.1 %
BLD GP AB SCN SERPL QL: NORMAL
BLOOD BANK CMNT PATIENT-IMP: NORMAL
DIFFERENTIAL METHOD BLD: ABNORMAL
EOSINOPHIL # BLD AUTO: 0.1 10E9/L (ref 0–0.7)
EOSINOPHIL NFR BLD AUTO: 0.6 %
ERYTHROCYTE [DISTWIDTH] IN BLOOD BY AUTOMATED COUNT: 15.8 % (ref 10–15)
HCT VFR BLD AUTO: 37.3 % (ref 35–47)
HGB BLD-MCNC: 12.5 G/DL (ref 11.7–15.7)
IMM GRANULOCYTES # BLD: 0.1 10E9/L (ref 0–0.4)
IMM GRANULOCYTES NFR BLD: 0.6 %
LYMPHOCYTES # BLD AUTO: 1.5 10E9/L (ref 0.8–5.3)
LYMPHOCYTES NFR BLD AUTO: 17.5 %
MCH RBC QN AUTO: 30.1 PG (ref 26.5–33)
MCHC RBC AUTO-ENTMCNC: 33.5 G/DL (ref 31.5–36.5)
MCV RBC AUTO: 90 FL (ref 78–100)
MONOCYTES # BLD AUTO: 0.7 10E9/L (ref 0–1.3)
MONOCYTES NFR BLD AUTO: 8.4 %
NEUTROPHILS # BLD AUTO: 6.2 10E9/L (ref 1.6–8.3)
NEUTROPHILS NFR BLD AUTO: 72.8 %
NRBC # BLD AUTO: 0 10*3/UL
NRBC BLD AUTO-RTO: 0 /100
PLATELET # BLD AUTO: 117 10E9/L (ref 150–450)
RBC # BLD AUTO: 4.15 10E12/L (ref 3.8–5.2)
SPECIMEN EXP DATE BLD: NORMAL
T PALLIDUM AB SER QL: NONREACTIVE
WBC # BLD AUTO: 8.5 10E9/L (ref 4–11)

## 2018-08-18 PROCEDURE — 36415 COLL VENOUS BLD VENIPUNCTURE: CPT | Performed by: OBSTETRICS & GYNECOLOGY

## 2018-08-18 PROCEDURE — 85025 COMPLETE CBC W/AUTO DIFF WBC: CPT | Performed by: OBSTETRICS & GYNECOLOGY

## 2018-08-18 PROCEDURE — 25000132 ZZH RX MED GY IP 250 OP 250 PS 637: Performed by: OBSTETRICS & GYNECOLOGY

## 2018-08-18 PROCEDURE — 25000128 H RX IP 250 OP 636: Performed by: OBSTETRICS & GYNECOLOGY

## 2018-08-18 PROCEDURE — 86850 RBC ANTIBODY SCREEN: CPT | Performed by: OBSTETRICS & GYNECOLOGY

## 2018-08-18 PROCEDURE — 72200001 ZZH LABOR CARE VAGINAL DELIVERY SINGLE

## 2018-08-18 PROCEDURE — 12000037 ZZH R&B POSTPARTUM INTERMEDIATE

## 2018-08-18 PROCEDURE — 86900 BLOOD TYPING SEROLOGIC ABO: CPT | Performed by: OBSTETRICS & GYNECOLOGY

## 2018-08-18 PROCEDURE — 10907ZC DRAINAGE OF AMNIOTIC FLUID, THERAPEUTIC FROM PRODUCTS OF CONCEPTION, VIA NATURAL OR ARTIFICIAL OPENING: ICD-10-PCS | Performed by: OBSTETRICS & GYNECOLOGY

## 2018-08-18 PROCEDURE — 86780 TREPONEMA PALLIDUM: CPT | Performed by: OBSTETRICS & GYNECOLOGY

## 2018-08-18 PROCEDURE — 59400 OBSTETRICAL CARE: CPT | Performed by: OBSTETRICS & GYNECOLOGY

## 2018-08-18 PROCEDURE — 59025 FETAL NON-STRESS TEST: CPT

## 2018-08-18 PROCEDURE — 27110038 ZZH RX 271: Performed by: ANESTHESIOLOGY

## 2018-08-18 PROCEDURE — 25000128 H RX IP 250 OP 636: Performed by: ANESTHESIOLOGY

## 2018-08-18 PROCEDURE — 37000011 ZZH ANESTHESIA WARD SERVICE

## 2018-08-18 PROCEDURE — G0463 HOSPITAL OUTPT CLINIC VISIT: HCPCS | Mod: 25

## 2018-08-18 PROCEDURE — 25000125 ZZHC RX 250: Performed by: ANESTHESIOLOGY

## 2018-08-18 PROCEDURE — 3E0R3BZ INTRODUCTION OF ANESTHETIC AGENT INTO SPINAL CANAL, PERCUTANEOUS APPROACH: ICD-10-PCS | Performed by: ANESTHESIOLOGY

## 2018-08-18 PROCEDURE — 86901 BLOOD TYPING SEROLOGIC RH(D): CPT | Performed by: OBSTETRICS & GYNECOLOGY

## 2018-08-18 PROCEDURE — 00HU33Z INSERTION OF INFUSION DEVICE INTO SPINAL CANAL, PERCUTANEOUS APPROACH: ICD-10-PCS | Performed by: ANESTHESIOLOGY

## 2018-08-18 RX ORDER — ONDANSETRON 2 MG/ML
4 INJECTION INTRAMUSCULAR; INTRAVENOUS EVERY 6 HOURS PRN
Status: DISCONTINUED | OUTPATIENT
Start: 2018-08-18 | End: 2018-08-18

## 2018-08-18 RX ORDER — NALOXONE HYDROCHLORIDE 0.4 MG/ML
.1-.4 INJECTION, SOLUTION INTRAMUSCULAR; INTRAVENOUS; SUBCUTANEOUS
Status: DISCONTINUED | OUTPATIENT
Start: 2018-08-18 | End: 2018-08-18

## 2018-08-18 RX ORDER — METHYLERGONOVINE MALEATE 0.2 MG/ML
200 INJECTION INTRAVENOUS
Status: DISCONTINUED | OUTPATIENT
Start: 2018-08-18 | End: 2018-08-18

## 2018-08-18 RX ORDER — OXYTOCIN/0.9 % SODIUM CHLORIDE 30/500 ML
340 PLASTIC BAG, INJECTION (ML) INTRAVENOUS CONTINUOUS PRN
Status: DISCONTINUED | OUTPATIENT
Start: 2018-08-18 | End: 2018-08-20 | Stop reason: HOSPADM

## 2018-08-18 RX ORDER — BISACODYL 10 MG
10 SUPPOSITORY, RECTAL RECTAL DAILY PRN
Status: DISCONTINUED | OUTPATIENT
Start: 2018-08-20 | End: 2018-08-20 | Stop reason: HOSPADM

## 2018-08-18 RX ORDER — LEVOTHYROXINE SODIUM 50 UG/1
50 TABLET ORAL DAILY
Status: DISCONTINUED | OUTPATIENT
Start: 2018-08-18 | End: 2018-08-20 | Stop reason: HOSPADM

## 2018-08-18 RX ORDER — OXYTOCIN/0.9 % SODIUM CHLORIDE 30/500 ML
100-340 PLASTIC BAG, INJECTION (ML) INTRAVENOUS CONTINUOUS PRN
Status: DISCONTINUED | OUTPATIENT
Start: 2018-08-18 | End: 2018-08-18

## 2018-08-18 RX ORDER — OXYCODONE AND ACETAMINOPHEN 5; 325 MG/1; MG/1
1 TABLET ORAL
Status: DISCONTINUED | OUTPATIENT
Start: 2018-08-18 | End: 2018-08-18

## 2018-08-18 RX ORDER — ACETAMINOPHEN 325 MG/1
650 TABLET ORAL EVERY 4 HOURS PRN
Status: DISCONTINUED | OUTPATIENT
Start: 2018-08-18 | End: 2018-08-18

## 2018-08-18 RX ORDER — FENTANYL CITRATE 50 UG/ML
100 INJECTION, SOLUTION INTRAMUSCULAR; INTRAVENOUS ONCE
Status: COMPLETED | OUTPATIENT
Start: 2018-08-18 | End: 2018-08-18

## 2018-08-18 RX ORDER — NALOXONE HYDROCHLORIDE 0.4 MG/ML
.1-.4 INJECTION, SOLUTION INTRAMUSCULAR; INTRAVENOUS; SUBCUTANEOUS
Status: DISCONTINUED | OUTPATIENT
Start: 2018-08-18 | End: 2018-08-20 | Stop reason: HOSPADM

## 2018-08-18 RX ORDER — HYDROCORTISONE 2.5 %
CREAM (GRAM) TOPICAL 3 TIMES DAILY PRN
Status: DISCONTINUED | OUTPATIENT
Start: 2018-08-18 | End: 2018-08-20 | Stop reason: HOSPADM

## 2018-08-18 RX ORDER — AMOXICILLIN 250 MG
1 CAPSULE ORAL 2 TIMES DAILY
Status: DISCONTINUED | OUTPATIENT
Start: 2018-08-18 | End: 2018-08-20 | Stop reason: HOSPADM

## 2018-08-18 RX ORDER — FENTANYL CITRATE 50 UG/ML
50-100 INJECTION, SOLUTION INTRAMUSCULAR; INTRAVENOUS
Status: DISCONTINUED | OUTPATIENT
Start: 2018-08-18 | End: 2018-08-18

## 2018-08-18 RX ORDER — IBUPROFEN 400 MG/1
800 TABLET, FILM COATED ORAL EVERY 6 HOURS PRN
Status: DISCONTINUED | OUTPATIENT
Start: 2018-08-18 | End: 2018-08-20 | Stop reason: HOSPADM

## 2018-08-18 RX ORDER — MISOPROSTOL 200 UG/1
400 TABLET ORAL
Status: DISCONTINUED | OUTPATIENT
Start: 2018-08-18 | End: 2018-08-20 | Stop reason: HOSPADM

## 2018-08-18 RX ORDER — OXYTOCIN 10 [USP'U]/ML
10 INJECTION, SOLUTION INTRAMUSCULAR; INTRAVENOUS
Status: DISCONTINUED | OUTPATIENT
Start: 2018-08-18 | End: 2018-08-18

## 2018-08-18 RX ORDER — ACETAMINOPHEN 325 MG/1
650 TABLET ORAL EVERY 4 HOURS PRN
Status: DISCONTINUED | OUTPATIENT
Start: 2018-08-18 | End: 2018-08-20 | Stop reason: HOSPADM

## 2018-08-18 RX ORDER — CARBOPROST TROMETHAMINE 250 UG/ML
250 INJECTION, SOLUTION INTRAMUSCULAR
Status: DISCONTINUED | OUTPATIENT
Start: 2018-08-18 | End: 2018-08-18

## 2018-08-18 RX ORDER — OXYTOCIN/0.9 % SODIUM CHLORIDE 30/500 ML
100 PLASTIC BAG, INJECTION (ML) INTRAVENOUS CONTINUOUS
Status: DISCONTINUED | OUTPATIENT
Start: 2018-08-18 | End: 2018-08-20 | Stop reason: HOSPADM

## 2018-08-18 RX ORDER — AMOXICILLIN 250 MG
2 CAPSULE ORAL 2 TIMES DAILY
Status: DISCONTINUED | OUTPATIENT
Start: 2018-08-18 | End: 2018-08-20 | Stop reason: HOSPADM

## 2018-08-18 RX ORDER — CITALOPRAM HYDROBROMIDE 20 MG/1
40 TABLET ORAL DAILY
Status: DISCONTINUED | OUTPATIENT
Start: 2018-08-18 | End: 2018-08-20 | Stop reason: HOSPADM

## 2018-08-18 RX ORDER — NALBUPHINE HYDROCHLORIDE 10 MG/ML
2.5-5 INJECTION, SOLUTION INTRAMUSCULAR; INTRAVENOUS; SUBCUTANEOUS EVERY 6 HOURS PRN
Status: DISCONTINUED | OUTPATIENT
Start: 2018-08-18 | End: 2018-08-18

## 2018-08-18 RX ORDER — EPHEDRINE SULFATE 50 MG/ML
5 INJECTION, SOLUTION INTRAMUSCULAR; INTRAVENOUS; SUBCUTANEOUS
Status: DISCONTINUED | OUTPATIENT
Start: 2018-08-18 | End: 2018-08-18

## 2018-08-18 RX ORDER — IBUPROFEN 400 MG/1
800 TABLET, FILM COATED ORAL
Status: DISCONTINUED | OUTPATIENT
Start: 2018-08-18 | End: 2018-08-18

## 2018-08-18 RX ORDER — PRENATAL VIT/IRON FUM/FOLIC AC 27MG-0.8MG
1 TABLET ORAL DAILY
Status: DISCONTINUED | OUTPATIENT
Start: 2018-08-18 | End: 2018-08-20 | Stop reason: HOSPADM

## 2018-08-18 RX ORDER — SODIUM CHLORIDE, SODIUM LACTATE, POTASSIUM CHLORIDE, CALCIUM CHLORIDE 600; 310; 30; 20 MG/100ML; MG/100ML; MG/100ML; MG/100ML
INJECTION, SOLUTION INTRAVENOUS CONTINUOUS
Status: DISCONTINUED | OUTPATIENT
Start: 2018-08-18 | End: 2018-08-18

## 2018-08-18 RX ORDER — ROPIVACAINE HYDROCHLORIDE 2 MG/ML
10 INJECTION, SOLUTION EPIDURAL; INFILTRATION; PERINEURAL ONCE
Status: COMPLETED | OUTPATIENT
Start: 2018-08-18 | End: 2018-08-18

## 2018-08-18 RX ORDER — LANOLIN 100 %
OINTMENT (GRAM) TOPICAL
Status: DISCONTINUED | OUTPATIENT
Start: 2018-08-18 | End: 2018-08-20 | Stop reason: HOSPADM

## 2018-08-18 RX ORDER — OXYTOCIN 10 [USP'U]/ML
10 INJECTION, SOLUTION INTRAMUSCULAR; INTRAVENOUS
Status: DISCONTINUED | OUTPATIENT
Start: 2018-08-18 | End: 2018-08-20 | Stop reason: HOSPADM

## 2018-08-18 RX ADMIN — LEVOTHYROXINE SODIUM 50 MCG: 50 TABLET ORAL at 17:37

## 2018-08-18 RX ADMIN — IBUPROFEN 800 MG: 400 TABLET ORAL at 23:29

## 2018-08-18 RX ADMIN — ACETAMINOPHEN 650 MG: 325 TABLET, FILM COATED ORAL at 17:34

## 2018-08-18 RX ADMIN — Medication 5 MG: at 06:17

## 2018-08-18 RX ADMIN — ACETAMINOPHEN 650 MG: 325 TABLET, FILM COATED ORAL at 23:29

## 2018-08-18 RX ADMIN — Medication 12 ML/HR: at 05:49

## 2018-08-18 RX ADMIN — SODIUM CHLORIDE, POTASSIUM CHLORIDE, SODIUM LACTATE AND CALCIUM CHLORIDE 250 ML: 600; 310; 30; 20 INJECTION, SOLUTION INTRAVENOUS at 06:06

## 2018-08-18 RX ADMIN — PRENATAL VIT W/ FE FUMARATE-FA TAB 27-0.8 MG 1 TABLET: 27-0.8 TAB at 17:37

## 2018-08-18 RX ADMIN — CITALOPRAM HYDROBROMIDE 40 MG: 20 TABLET ORAL at 17:36

## 2018-08-18 RX ADMIN — FENTANYL CITRATE 100 MCG: 50 INJECTION, SOLUTION INTRAMUSCULAR; INTRAVENOUS at 05:53

## 2018-08-18 RX ADMIN — IBUPROFEN 800 MG: 400 TABLET ORAL at 17:34

## 2018-08-18 RX ADMIN — SODIUM CHLORIDE, POTASSIUM CHLORIDE, SODIUM LACTATE AND CALCIUM CHLORIDE 1000 ML: 600; 310; 30; 20 INJECTION, SOLUTION INTRAVENOUS at 05:10

## 2018-08-18 RX ADMIN — ROPIVACAINE HYDROCHLORIDE 10 ML: 2 INJECTION, SOLUTION EPIDURAL; INFILTRATION at 05:53

## 2018-08-18 RX ADMIN — SODIUM CHLORIDE, POTASSIUM CHLORIDE, SODIUM LACTATE AND CALCIUM CHLORIDE: 600; 310; 30; 20 INJECTION, SOLUTION INTRAVENOUS at 05:45

## 2018-08-18 ASSESSMENT — ACTIVITIES OF DAILY LIVING (ADL)
COGNITION: 0 - NO COGNITION ISSUES REPORTED
RETIRED_EATING: 0-->INDEPENDENT
FALL_HISTORY_WITHIN_LAST_SIX_MONTHS: NO
RETIRED_COMMUNICATION: 0-->UNDERSTANDS/COMMUNICATES WITHOUT DIFFICULTY
TRANSFERRING: 0-->INDEPENDENT
BATHING: 0-->INDEPENDENT
DRESS: 0-->INDEPENDENT
TOILETING: 0-->INDEPENDENT
SWALLOWING: 0-->SWALLOWS FOODS/LIQUIDS WITHOUT DIFFICULTY
AMBULATION: 0-->INDEPENDENT

## 2018-08-18 NOTE — IP AVS SNAPSHOT
[address]    [place] [address]    Phone:  [phone]                                       After Visit Summary   8/18/2018    Dave Meyer    MRN: [id number]           After Visit Summary Signature Page     I have received my discharge instructions, and my questions have been answered. I have discussed any challenges I see with this plan with the nurse or doctor.    ..........................................................................................................................................  Patient/Patient Representative Signature      ..........................................................................................................................................  Patient Representative Print Name and Relationship to Patient    ..................................................               ................................................  Date                                            Time    ..........................................................................................................................................  Reviewed by Signature/Title    ...................................................              ..............................................  Date                                                            Time

## 2018-08-18 NOTE — PLAN OF CARE
Data: Dave Meyer transferred to [facility] via wheelchair at 1505. Baby transferred via parent's arms.  Action: Receiving unit notified of transfer: Yes. Patient and family notified of room change. Report given to Charlotte GRAHAM RN at 1510. Belongings sent to receiving unit. Accompanied by Registered Nurse. Oriented patient to surroundings. Call light within reach. ID bands double-checked with receiving RN.  Response: Patient tolerated transfer and is stable.

## 2018-08-18 NOTE — PLAN OF CARE
[facility] at 0340 ambulatory with Clyde, spouse.    Reason for maternal/fetal assessment per patient is contractions for 2 hours every 2-4 minutes   Patient is a .  Gestational age is 39w 3d.  VSS.  Prenatal record reviewed.  Verbal consent for EFM.  Triage assessment completed.  EFM applied for fetal well being with uterine contractions.  Uterine assessment completed, non-tender and palpates soft between contractions.      Patient reports fetal movement is present.  Patient denies  vaginal discharge, pelvic pressure, UTI symptoms, GI problems, bloody show, vaginal bleeding, edema, headache, visual disturbances, epigastric or URQ pain, and/or rupture of membranes.      Fetal heart tone tracing displays moderate variability with a 140 baseline, accelerations present, and no decelerations noted.  Fetal assessment presumed adequate fetal oxygenation - see flow record.      Initial cervix check posterior, dilated to 2.5 cm, effaced to 90% and -2 station    Patient ambulated. Cervix rechecked posterior, dilated to 3.5 cm, effaced to 90% and -2 station    Dr. Jaquez informed of patient arrival.  Update included but not limited to maternal history.  Presenting complaint of uterine contractions for 2 hours every 3-4 minutes, SVE 2.5/90/-2 on arrival and after observation is 3.5/90/-2, fetal heartones reactive with a 140 baseline, accelerations present and without decelerations.  Contraction pattern and patient comfort with same.  Patient desire for epidural for pain control during labor, GBS negative.  Plan per provider is  admit to labor with routine intrapartum order set, may have epidural, and may augment with oxytocin per protocol if necessary. Call with any concerns.      Patient to room 214 ambulatory report to Rukhsana GRAHAM RN

## 2018-08-18 NOTE — IP AVS SNAPSHOT
MRN:[id number]                      After Visit Summary   8/18/2018    Dave Meyer    MRN: [id number]           Thank you!     Thank you for choosing [facility] for your care. Our goal is always to provide you with excellent care. Hearing back from our patients is one way we can continue to improve our services. Please take a few minutes to complete the written survey that you may receive in the mail after you visit with us. Thank you!        Patient Information     Date Of Birth          1990        Designated Caregiver       Most Recent Value    Caregiver    Will someone help with your care after discharge? no      About your hospital stay     You were admitted on:  August 18, 2018 You last received care in the:  [address]    You were discharged on:  August 20, 2018       Who to Call     For medical emergencies, please call 911.  For non-urgent questions about your medical care, please call your primary care provider or clinic, [phone]          Attending Provider     Provider Specialty    Ivis Jaquez MD OB/Gyn    MastersIngris DO OB/Gyn       Primary Care Provider Office Phone # Fax #    Ingris Briones [phone]      Your next 10 appointments already scheduled     Sep 17, 2018 11:00 AM CDT   Post Partum with Ingris Briones DO   [facility] ([facility])    [address] [place] [id number] [phone]              Further instructions from your care team       Postpartum Vaginal Delivery Instructions    Activity       Ask family and friends for help when you need it.    Do not place anything in your vagina for 6 weeks.    You are not restricted on other activities, but take it easy for a few weeks to allow your body to recover from delivery.  You are able to do any activities you feel up to that point.    No driving until you have  stopped taking your pain medications (usually two weeks after delivery).     Call your health care provider if you have any of these symptoms:       Increased pain, swelling, redness, or fluid around your stiches from an episiotomy or perineal tear.    A fever above 100.4 F (38 C) with or without chills when placing a thermometer under your tongue.    You soak a sanitary pad with blood within 1 hour, or you see blood clots larger than a golf ball.    Bleeding that lasts more than 6 weeks.    Vaginal discharge that smells bad.    Severe pain, cramping or tenderness in your lower belly area.    A need to urinate more frequently (use the toilet more often), more urgently (use the toilet very quickly), or it burns when you urinate.    Nausea and vomiting.    Redness, swelling or pain around a vein in your leg.    Problems breastfeeding or a red or painful area on your breast.    Chest pain and cough or are gasping for air.    Problems coping with sadness, anxiety, or depression.  If you have any concerns about hurting yourself or the baby, call your provider immediately.     You have questions or concerns after you return home.     Keep your hands clean:  Always wash your hands before touching your perineal area and stitches.  This helps reduce your risk of infection.  If your hands aren't dirty, you may use an alcohol hand-rub to clean your hands. Keep your nails clean and short.      DISCHARGE INSTRUCTIONS    Medications:  -May take Ibuprofen (800mg by mouth every 8 hours as needed for pain) or tylenol (500mg by mouth every 6 hours as needed for pain; max 4000mg per day) when at home as needed for pain/cramps/headaches/pain, follow instructions on bottle.  -You may use miralax (daily) or docusate (one tab by mouth twice daily) for stool softening, these are over the counter and can be found at any pharmacy. Follow bottle instructions.  -Continue prenatal vitamins.     Activity:  -Pelvic rest (no sex, tampons) for 6  "weeks.   -General activity as tolerated, slowing increase daily. Avoid vigorous exercise, jumping or strength training for at least 4-6 weeks.  You may drive when you are able to safely operate a motor vehicle and are no longer taking narcotic medications if they have been prescribed for you.    Precuations:  -Call doctor if heavy unexpected bleeding, fever of 100.4 or greater, foul vaginal discharge, severe abdominal pain not helped with medications or for any other concerns or questions. If you are having headaches not resolved by tylenol or ibuprofen, new or different vision changes then notify your doctor.    Follow Up Visit:  -Call the [facility] to schedule your 6 week postpartum appointment, [phone].             Pending Results     No orders found from 8/16/2018 to 8/19/2018.            Statement of Approval     Ordered          08/20/18 1040  I have reviewed and agree with all the recommendations and orders detailed in this document.  EFFECTIVE NOW     Approved and electronically signed by:  Ingris Briones DO             Admission Information     Date & Time Provider Department Dept. Phone    8/18/2018 Ingris Briones,  [address] [phone]      Your Vitals Were     Blood Pressure Pulse Temperature Respirations Height Weight    106/60 74 98.4  F (36.9  C) (Oral) 16 1.549 m (5' 1\") 73 kg (161 lb)    Last Period Pulse Oximetry BMI (Body Mass Index)             11/15/2017 99% 30.42 kg/m2         [employer] Information     [employer] gives you secure access to your electronic health record. If you see a primary care provider, you can also send messages to your care team and make appointments. If you have questions, please call your primary care clinic.  If you do not have a primary care provider, please call [phone] and they will assist you.        Care EveryWhere ID     This is your Care EveryWhere ID. This could be used by other " organizations to access your [place] medical records  [id number]        Equal Access to Services     BRANDYJEB PILLO : Deidre Covington, higinio starks, alyssa wilson. So [facility] [phone].    ATENCIÓN: Si habla español, tiene a cedeno disposición servicios gratuitos de asistencia lingüística. Ulicesame al [phone].    We comply with applicable federal civil rights laws and [place] laws. We do not discriminate on the basis of race, color, national origin, age, disability, sex, sexual orientation, or gender identity.               Review of your medicines      CONTINUE these medicines which may have CHANGED, or have new prescriptions. If we are uncertain of the size of tablets/capsules you have at home, strength may be listed as something that might have changed.        Dose / Directions    levothyroxine 50 MCG tablet   Commonly known as:  SYNTHROID/LEVOTHROID   This may have changed:  how much to take   Used for:  Other specified hypothyroidism        Dose:  100 mcg   Take 2 tablets (100 mcg) by mouth daily   Quantity:  90 tablet   Refills:  3         CONTINUE these medicines which have NOT CHANGED        Dose / Directions    citalopram 40 MG tablet   Commonly known as:  celeXA        TK 1 T PO QD   Refills:  3       EPINEPHrine 0.3 MG/0.3ML injection 2-pack   Commonly known as:  EPIPEN/ADRENACLICK/or ANY BX GENERIC EQUIV   Used for:  Allergy to bee sting        Dose:  0.3 mg   Inject 0.3 mLs (0.3 mg) into the muscle as needed for anaphylaxis   Quantity:  2 mL   Refills:  3       PRENATAL VITAMIN PO        Refills:  0                Protect others around you: Learn how to safely use, store and throw away your medicines at www.disposemymeds.org.             Medication List: This is a list of all your medications and when to take them. Check marks below indicate your daily home schedule. Keep this list as a reference.      Medications           Morning  Afternoon Evening Bedtime As Needed    citalopram 40 MG tablet   Commonly known as:  celeXA   TK 1 T PO QD   Last time this was given:  40 mg on 8/20/2018  8:28 AM                                EPINEPHrine 0.3 MG/0.3ML injection 2-pack   Commonly known as:  EPIPEN/ADRENACLICK/or ANY BX GENERIC EQUIV   Inject 0.3 mLs (0.3 mg) into the muscle as needed for anaphylaxis                                levothyroxine 50 MCG tablet   Commonly known as:  SYNTHROID/LEVOTHROID   Take 2 tablets (100 mcg) by mouth daily   Last time this was given:  50 mcg on 8/20/2018  8:27 AM                                PRENATAL VITAMIN PO   Last time this was given:  1 tablet on 8/20/2018  8:28 AM

## 2018-08-18 NOTE — PLAN OF CARE
SHANIKA paged for epidural at 0530.  Dr. Hartley in room at 0540.  Medications, fentanyl and ropivicaine, handed off to SHANIKA at this time.  Time out performed.  Pt sat up at edge of bed.  EFM off during procedure.  Pt tolerated procedure well.  Into left tilt position at 0551, EFM reapplied.  BP set for every 2 minutes with continuous pulse oximeter in place.

## 2018-08-18 NOTE — H&P
No significant change in general health status based on examination of the patient, review of Nursing Admission Database and prenatal record. Presented in active labor at 4cm. Requesting epidural. Anticipate     EFW: 7lb

## 2018-08-18 NOTE — ANESTHESIA PREPROCEDURE EVALUATION
Anesthesia Plan      History & Physical Review      ASA Status:  .  OB Epidural Asa: 2            Postoperative Care      Consents                          .   no plan for orthopedic surgical intervention per family and patient preference  vac and wound care per PRS and wound care teams  PO abx per ID team, minocycline  encourage patient to spend majority of bed oob in bedside chair as able, has not been oob recently, PLEASE HAVE PT COME BY TO MOBILIZE PATIENT, she must remain 50% wb on the RLE and NO KNEE MOTION allowed, knee immobilizer if oob  coumadin, inr goal 2-3  continue to optimize nutrition  continue to involve psych  orthopedic issues are stable for discharge to rehab, please make sure patient and family is okay with discharge before initiating planning process

## 2018-08-18 NOTE — TELEPHONE ENCOUNTER
"  Reason for Disposition    [1] First baby (primipara) AND [2] contractions < 6 minutes apart  AND [3] present 2 hours    Additional Information    Negative: Passed out (i.e., lost consciousness, collapsed and was not responding)    Negative: Shock suspected (e.g., cold/pale/clammy skin, too weak to stand, low BP, rapid pulse)    Negative: Difficult to awaken or acting confused  (e.g., disoriented, slurred speech)    Negative: [1] SEVERE abdominal pain (e.g., excruciating) AND [2] constant AND [3] present > 1 hour    Negative: Severe bleeding (e.g., continuous red blood from vagina, or large blood clots)    Negative: Umbilical cord hanging out of the vagina (shiny, white, curled appearance, \"like telephone cord\")    Negative: Uncontrollable urge to push (i.e., feels like baby is coming out now)    Negative: Can see baby    Negative: Sounds like a life-threatening emergency to the triager    Negative: Pregnant < 37 weeks (i.e., )    Negative: [1] Uncertain delivery date AND [2] possibly pregnant < 37 weeks (i.e., )    Protocols used: PREGNANCY - LABOR-ADULT-  Caller states she is having contractions that are 4-6 minutes apart with moderate intensity. Caller YOHAN is 16 and is high risk. Triage guidelines recommend to go to labor and delivery. Triager called out to [facility] to inform of patient arrival and report.   "

## 2018-08-18 NOTE — L&D DELIVERY NOTE
Vacuum delivery of viable male at 1226   Vacuum performed for arrest of descent at 2.5-3 hours pushing and maternal exhaustion   Vacuum on for 3 pulls/CTXs with 1 pop-offs.   Baby was in OA position, +2 station, EFW 7#   Alternative strategies were discussed.   Consent Obtained   Surgical and resuscitation teams were available.   Baby's weight 7lbs. 15oz.     Apgars 7, 9   Light mec  Baby with blister on his scalp prior to vacuum placement. NNP at delivery    Primary:Masters

## 2018-08-19 PROCEDURE — 12000037 ZZH R&B POSTPARTUM INTERMEDIATE

## 2018-08-19 PROCEDURE — 25000132 ZZH RX MED GY IP 250 OP 250 PS 637: Performed by: OBSTETRICS & GYNECOLOGY

## 2018-08-19 RX ADMIN — IBUPROFEN 800 MG: 400 TABLET ORAL at 12:05

## 2018-08-19 RX ADMIN — CITALOPRAM HYDROBROMIDE 40 MG: 20 TABLET ORAL at 10:11

## 2018-08-19 RX ADMIN — LEVOTHYROXINE SODIUM 50 MCG: 50 TABLET ORAL at 10:12

## 2018-08-19 RX ADMIN — ACETAMINOPHEN 650 MG: 325 TABLET, FILM COATED ORAL at 06:02

## 2018-08-19 RX ADMIN — ACETAMINOPHEN 650 MG: 325 TABLET, FILM COATED ORAL at 18:22

## 2018-08-19 RX ADMIN — PRENATAL VIT W/ FE FUMARATE-FA TAB 27-0.8 MG 1 TABLET: 27-0.8 TAB at 10:11

## 2018-08-19 RX ADMIN — IBUPROFEN 800 MG: 400 TABLET ORAL at 06:02

## 2018-08-19 RX ADMIN — SENNOSIDES AND DOCUSATE SODIUM 2 TABLET: 8.6; 5 TABLET ORAL at 22:04

## 2018-08-19 RX ADMIN — IBUPROFEN 800 MG: 400 TABLET ORAL at 18:23

## 2018-08-19 RX ADMIN — SENNOSIDES AND DOCUSATE SODIUM 1 TABLET: 8.6; 5 TABLET ORAL at 10:12

## 2018-08-19 RX ADMIN — ACETAMINOPHEN 650 MG: 325 TABLET, FILM COATED ORAL at 12:05

## 2018-08-19 NOTE — PLAN OF CARE
Problem: Postpartum (Vaginal Delivery) (Adult,Obstetrics,Pediatric)  Goal: Signs and Symptoms of Listed Potential Problems Will be Absent, Minimized or Managed (Postpartum)  Signs and symptoms of listed potential problems will be absent, minimized or managed by discharge/transition of care (reference Postpartum (Vaginal Delivery) (Adult,Obstetrics,Pediatric) CPG).   Outcome: No Change  VSS  Pt ambulating frequently in room & showered independently this shift without difficulty.  Pt eating & drinking well  Pt reports her perineal area pain is well controlled with PO Ibuprofen & Tylenol, as well as ice packs & Tucks pads to perineum.  Breastfeeding going very well this shift, with good latch & audible suck/swallow.  Will continue to monitor.

## 2018-08-19 NOTE — L&D DELIVERY NOTE
Delivery Date:  2018      HISTORY OF PRESENT ILLNESS:  Dave Meyer is a 27-year-old,  4, para 0-0-3-0, whose pregnancy was followed by Dr. Ingris Briones.  The patient is blood type A positive, she is rubella immune, and she is group B strep negative.  The patient had 3 main complications during her pregnancy.  The first is that the patient has undergone in vitro pregnancies, both leading to miscarriage and then 1 spontaneous pregnancy leading to miscarriage.  She was on Prometrium and aspirin until 10 weeks with this pregnancy.  The patient also was hypothyroid.  She was initially on 150 mcg at the start of her pregnancy but actually was overcorrected much of the time and eventually ended up on 50 mcg towards the end of her pregnancy with a normal TSH.  Finally, she also has some anxiety and this was well controlled on 40 mg of citalopram during her pregnancy.  She passed her 1-hour GCT and she received her DTaP shot during pregnancy as well.      The patient presented to the hospital in active labor.  Active labor onset was at approximately 5:00 a.m. and on admission, she was found to be 2.5 cm dilated, 90% effaced and -2 station.  An hour later, she was found to be 3.5 cm dilated and was admitted.  She received an epidural for pain control, and upon my assessment of her at 8:35, she was found to be 9.5 cm, 100% effaced and 0 station.  We performed artificial rupture of membranes at 8:35 and there was very light tinged meconium at that point, although mostly clear fluid.  The patient was found to be completely dilated at 9:30 a.m. and because she was having significant pressure began pushing at 9:35.  After approximately 2-1/2 hours of pushing, she had brought the fetal station down to a +2 with caput to +3 and I was called to assess the patient for maternal fatigue and arrest of descent.  I assessed the patient and we did drain her bladder for approximately 100 mL of clear urine.  I pushed with  the patient  2 or 3 times and she was having excellent pushing efforts, but was at a +2 station with caput at +3.  We discussed vacuum-assisted vaginal delivery with all of the risks and benefits and potential need for  section if the vacuum did not work.  Resuscitation teams including the NICU and anesthesia teams were immediately available if necessary.  I applied the Mityvac vacuum for a total of 3 pulls.  There were no pop-offs after the first or second, but there was also very little movement.  I told the patient prior to the final pull that there was very little movement and that I was definitely thinking that she was going to end up needing a  section.  We then attempted one last final pull, mostly because the fetal heart tones were in the 80s and she was just starting a contraction, so we decided to try one final pull prior to calling the , and with that pull the baby ended up coming to a full crown but not delivering.  The vacuum popped off on that very final pull.  I then immediately cut a midline episiotomy and had the patient push one final time and the infant delivered from a straight occiput anterior position at 12:26 p.m.  I then rotated his head to the right occiput anterior position and was able to deliver the rest of him easily and atraumatically.  He did have a cord slung across his left shoulder and this was reduced immediately after the shoulder was delivered.  The infant was delivered onto the maternal chest and abdomen but was somewhat floppy with minimal respiratory tone.  We did an immediate cord clamping and then passed the infant off to the awaiting  nurse practitioner.  Of note, prior to applying the vacuum,  there was noted to be a blister on the fetal occiput and after the vacuum, there was definite denuding of the fetal scalp with some ecchymosis, but it was felt to be only in the superficial tissues.  The infant's Apgars were 7 and 9 at one and five  minutes respectively, and his weight was 7 pounds 15 ounces.      The placenta then delivered intact with a normal 3-vessel cord at 12:28 p.m.  The patient had no extension of the midline episiotomy that I had cut and this was repaired in the normal fashion with 3-0 Vicryl suture.  She was feeling some of this, so she was injected with 4 mL of 1% plain lidocaine into the peritoneum.  Her quantitative blood loss is pending at the time of this dictation, but the pouch in the drape had approximately 200 mL of EBL.      Both mother and infant were doing extremely well immediately after delivery and there were no complications.  All sponge, lap and instrument counts were correct x2.      First stage of labor 4 hours 30 minutes, second stage 2 hours and 56 minutes, third stage 2 minutes, for a total of 7 hours and 28 minutes.      DIAGNOSES:   1.  Intrauterine pregnancy in spontaneous labor at 39+3.   2.  Epidural for pain control.   3.  Vacuum-assisted vaginal delivery of a viable male infant due to arrest of descent and maternal exhaustion.   4.  Midline episiotomy without extension repaired in the normal fashion with 3-0 Vicryl suture.    5.  Hypothyroidism.   6.  Anxiety.         ANNETTE PRICE MD             D: 2018   T: 2018   MT: JESUS      Name:     AMY SINGH   MRN:      [id number]        Account:        [id number]   :      1990        Delivery Date:  2018               Document: [id number]

## 2018-08-19 NOTE — PLAN OF CARE
Problem: Patient Care Overview  Goal: Plan of Care/Patient Progress Review  Outcome: Improving  VSS. Baby is acting more interested in breastfeeding tonight and has good latches with assistance. Voiding and stooling. Head has vacuum marking and bruised with an open blister, bacitracin ordered and applied BID. Slightly boggy in the back of head. Will continue to monitor.

## 2018-08-19 NOTE — ANESTHESIA POSTPROCEDURE EVALUATION
Patient: Dave Meyer    * No procedures listed *    Diagnosis:* No pre-op diagnosis entered *  Diagnosis Additional Information: No value filed.    Anesthesia Type:  No value filed.    Note:  Anesthesia Post Evaluation    Patient location during evaluation: Phase 2  Patient participation: Able to fully participate in evaluation  Level of consciousness: awake and alert  Pain management: adequate  Airway patency: patent  Cardiovascular status: acceptable  Respiratory status: acceptable  Hydration status: acceptable  PONV: none     Anesthetic complications: None          Last vitals:  Vitals:    08/18/18 1515 08/18/18 2331 08/19/18 0843   BP: 112/66 109/63 95/62   Pulse: 86 75 74   Resp: 16 16 16   Temp:  36.7  C (98  F) 36.8  C (98.2  F)   SpO2:            Electronically Signed By: Nelsy Swan MD  August 19, 2018  5:04 PM

## 2018-08-19 NOTE — PLAN OF CARE
Problem: Patient Care Overview  Goal: Plan of Care/Patient Progress Review  Outcome: Improving  Pt on pathway and VSS. Firm at U/1. Taking tylenol and ibuprofen for pain. Up and ambulating ad lawanda. Baby is breastfeeding.

## 2018-08-20 VITALS
DIASTOLIC BLOOD PRESSURE: 60 MMHG | TEMPERATURE: 98.4 F | HEART RATE: 74 BPM | BODY MASS INDEX: 30.4 KG/M2 | WEIGHT: 161 LBS | SYSTOLIC BLOOD PRESSURE: 106 MMHG | RESPIRATION RATE: 16 BRPM | HEIGHT: 61 IN | OXYGEN SATURATION: 99 %

## 2018-08-20 PROCEDURE — 25000132 ZZH RX MED GY IP 250 OP 250 PS 637: Performed by: OBSTETRICS & GYNECOLOGY

## 2018-08-20 RX ADMIN — PRENATAL VIT W/ FE FUMARATE-FA TAB 27-0.8 MG 1 TABLET: 27-0.8 TAB at 08:28

## 2018-08-20 RX ADMIN — SENNOSIDES AND DOCUSATE SODIUM 2 TABLET: 8.6; 5 TABLET ORAL at 08:27

## 2018-08-20 RX ADMIN — IBUPROFEN 800 MG: 400 TABLET ORAL at 06:33

## 2018-08-20 RX ADMIN — LEVOTHYROXINE SODIUM 50 MCG: 50 TABLET ORAL at 08:27

## 2018-08-20 RX ADMIN — ACETAMINOPHEN 650 MG: 325 TABLET, FILM COATED ORAL at 06:33

## 2018-08-20 RX ADMIN — ACETAMINOPHEN 650 MG: 325 TABLET, FILM COATED ORAL at 01:03

## 2018-08-20 RX ADMIN — CITALOPRAM HYDROBROMIDE 40 MG: 20 TABLET ORAL at 08:28

## 2018-08-20 RX ADMIN — IBUPROFEN 800 MG: 400 TABLET ORAL at 01:03

## 2018-08-20 NOTE — DISCHARGE INSTRUCTIONS
Postpartum Vaginal Delivery Instructions    Activity       Ask family and friends for help when you need it.    Do not place anything in your vagina for 6 weeks.    You are not restricted on other activities, but take it easy for a few weeks to allow your body to recover from delivery.  You are able to do any activities you feel up to that point.    No driving until you have stopped taking your pain medications (usually two weeks after delivery).     Call your health care provider if you have any of these symptoms:       Increased pain, swelling, redness, or fluid around your stiches from an episiotomy or perineal tear.    A fever above 100.4 F (38 C) with or without chills when placing a thermometer under your tongue.    You soak a sanitary pad with blood within 1 hour, or you see blood clots larger than a golf ball.    Bleeding that lasts more than 6 weeks.    Vaginal discharge that smells bad.    Severe pain, cramping or tenderness in your lower belly area.    A need to urinate more frequently (use the toilet more often), more urgently (use the toilet very quickly), or it burns when you urinate.    Nausea and vomiting.    Redness, swelling or pain around a vein in your leg.    Problems breastfeeding or a red or painful area on your breast.    Chest pain and cough or are gasping for air.    Problems coping with sadness, anxiety, or depression.  If you have any concerns about hurting yourself or the baby, call your provider immediately.     You have questions or concerns after you return home.     Keep your hands clean:  Always wash your hands before touching your perineal area and stitches.  This helps reduce your risk of infection.  If your hands aren't dirty, you may use an alcohol hand-rub to clean your hands. Keep your nails clean and short.      DISCHARGE INSTRUCTIONS    Medications:  -May take Ibuprofen (800mg by mouth every 8 hours as needed for pain) or tylenol (500mg by mouth every 6 hours as needed for  pain; max 4000mg per day) when at home as needed for pain/cramps/headaches/pain, follow instructions on bottle.  -You may use miralax (daily) or docusate (one tab by mouth twice daily) for stool softening, these are over the counter and can be found at any pharmacy. Follow bottle instructions.  -Continue prenatal vitamins.     Activity:  -Pelvic rest (no sex, tampons) for 6 weeks.   -General activity as tolerated, slowing increase daily. Avoid vigorous exercise, jumping or strength training for at least 4-6 weeks.  You may drive when you are able to safely operate a motor vehicle and are no longer taking narcotic medications if they have been prescribed for you.    Precuations:  -Call doctor if heavy unexpected bleeding, fever of 100.4 or greater, foul vaginal discharge, severe abdominal pain not helped with medications or for any other concerns or questions. If you are having headaches not resolved by tylenol or ibuprofen, new or different vision changes then notify your doctor.    Follow Up Visit:  -Call the [facility] to schedule your 6 week postpartum appointment, [phone].

## 2018-08-20 NOTE — PLAN OF CARE
Problem: Patient Care Overview  Goal: Plan of Care/Patient Progress Review  Outcome: Improving  Vs wnl.  Pain controlled w/ ordered meds.  Mom and dad attentive,  Handling infant well.  Mom independent w/ cares.   Continue per POC

## 2018-08-20 NOTE — LACTATION NOTE
Routine visit. Dave states breastfeeding is going well. She is discharging home today with her infant. Discussed how to tell if infant is getting enough to eat by tracking infant's output via the feeding log and following up with peds for weight checks. Dave denies further questions or concerns. Encouraged Dave to use outpatient lactation resources as needed. Dave appreciative of my visit.     used

## 2018-08-20 NOTE — PROGRESS NOTES
Obstetrics Postpartum Rounding Note, PPD#2    S: Doing well. Pain controlled. Breast feeding. Minimal lochia. Ready for discharge      O:   Vitals:    08/19/18 0843 08/19/18 1630 08/20/18 0230 08/20/18 0812   BP: 95/62 102/64 102/59 106/60   Pulse: 74 76 63 74   Resp: 16 16 16 16   Temp: 98.2  F (36.8  C) 98  F (36.7  C) 97.7  F (36.5  C) 98.4  F (36.9  C)   TempSrc:  Oral Oral Oral   SpO2:       Weight:       Height:           Gen: AOx3, NAD  Abd: soft, ND, non ttp, FF@ U-2.   Ext: no calf ttp, no edema    Labs:         Hemoglobin   Date Value Ref Range Status   08/18/2018 12.5 11.7 - 15.7 g/dL Final   05/31/2018 10.5 (L) 11.7 - 15.7 g/dL Final            Lab Results   Component Value Date    RH Pos 08/18/2018       Meds:  Current Facility-Administered Medications   Medication     acetaminophen (TYLENOL) tablet 650 mg     bisacodyl (DULCOLAX) Suppository 10 mg     citalopram (celeXA) tablet 40 mg     hydrocortisone 2.5 % cream     ibuprofen (ADVIL/MOTRIN) tablet 800 mg     lactated ringers BOLUS 1,000 mL     lanolin ointment     levothyroxine (SYNTHROID/LEVOTHROID) tablet 50 mcg     misoprostol (CYTOTEC) tablet 400 mcg     naloxone (NARCAN) injection 0.1-0.4 mg     No MMR Needed - Assessment: Patient does not need MMR vaccine     NO Rho (D) immune globulin (RhoGam) needed - mother Rh POSITIVE     No Tdap Needed - Assessment: Patient does not need Tdap vaccine     oxytocin (PITOCIN) 30 units in 500 mL 0.9% NaCl infusion     oxytocin (PITOCIN) 30 units in 500 mL 0.9% NaCl infusion     oxytocin (PITOCIN) injection 10 Units     prenatal multivitamin plus iron per tablet 1 tablet     senna-docusate (SENOKOT-S;PERICOLACE) 8.6-50 MG per tablet 1 tablet    Or     senna-docusate (SENOKOT-S;PERICOLACE) 8.6-50 MG per tablet 2 tablet     sodium phosphate (FLEET ENEMA) 1 enema       A/P: PPD#2 s/p VAVD doing well.  -Continue routine care.  -Anticipate discharge today. Precautions reveiwed. F/U 6wk    Ingris Damians,  DO  August 20, 2018

## 2018-08-20 NOTE — PLAN OF CARE
Problem: Patient Care Overview  Goal: Plan of Care/Patient Progress Review  Outcome: Adequate for Discharge Date Met: 08/20/18  AVSS. Up independently in room. Voiding and stooling. Any c/o pain resolved with tylenol and Ibuprofen. Some assistance with breastfeeding latch given, otherwise independent with baby cares. Discharge home today and will follow up in clinic in 6 weeks. All questions answered.

## 2018-08-31 ENCOUNTER — DOCUMENTATION ONLY (OUTPATIENT)
Dept: CARE COORDINATION | Facility: CLINIC | Age: 28
End: 2018-08-31

## 2018-08-31 NOTE — PROGRESS NOTES
[facility] will be sharing updates with you on Maternal Child Health Referral requests for home care services.  This is for care coordination purposes and alert you to referral status.  We received the referral for  Dave Meyer; MRN [id number] and want to update you:    [facility] has made 3 attempts to contact patient by phone and text message over the last 11 days.   We have not had any response from patient.  Final message was left advising patient to follow up with Primary Care Providers for mom and baby.     Sincerely [facility]  Yanique Franco  [phone]

## 2018-09-12 NOTE — ANESTHESIA PROCEDURE NOTES
Peripheral nerve/Neuraxial procedure note : epidural catheter  Pre-Procedure  Performed by SAMEER BENTON  Location: OB      Pre-Anesthestic Checklist: patient identified, IV checked, risks and benefits discussed, informed consent, monitors and equipment checked, pre-op evaluation and at physician/surgeon's request    Timeout  Correct Patient: Yes   Correct Procedure: Yes   Correct Site: Yes   Correct Laterality: N/A   Correct Position: Yes   Site Marked: N/A   .   Procedure Documentation    .    Procedure:    Epidural catheter.  Insertion Site:L3-4  (midline approach) Injection technique: LORT saline   Local skin infiltrated with 3 mL of 1% lidocaine.  PARISH at 5 cm     Patient Prep;mask, sterile gloves, povidone-iodine 7.5% surgical scrub, patient draped.  .  Needle: ToInstraGroky needle Needle Gauge: 17.    Needle Length (Inches) 3.5  # of attempts: 1 and # of redirects: : 0. .   Catheter: 19 G . .  Catheter threaded easily  4 cm epidural space.  9 cm at skin.   .    Assessment/Narrative  Paresthesias: No.  .  .  Aspiration negative for heme or CSF  . Test dose of 3 mL lidocaine 1.5% w/ 1:200,000 epinephrine at. Test dose negative for signs of intravascular, subdural or intrathecal injection. Comments:  Pt tolerated well.   Immediately to supine with BABAR.   FHTs stable post-procedure.   No complications.

## 2018-09-12 NOTE — ADDENDUM NOTE
Addendum  created 09/12/18 1013 by Odilia, Sena Rahman MD    Anesthesia Intra Blocks edited, Child order released for a procedure order, Sign clinical note

## 2018-10-11 ENCOUNTER — PRENATAL OFFICE VISIT (OUTPATIENT)
Dept: OBGYN | Facility: CLINIC | Age: 28
End: 2018-10-11
Payer: COMMERCIAL

## 2018-10-11 VITALS
HEIGHT: 61 IN | SYSTOLIC BLOOD PRESSURE: 96 MMHG | BODY MASS INDEX: 26.09 KG/M2 | DIASTOLIC BLOOD PRESSURE: 60 MMHG | HEART RATE: 60 BPM | WEIGHT: 138.2 LBS

## 2018-10-11 DIAGNOSIS — Z12.4 CERVICAL CANCER SCREENING: ICD-10-CM

## 2018-10-11 DIAGNOSIS — E03.8 OTHER SPECIFIED HYPOTHYROIDISM: ICD-10-CM

## 2018-10-11 PROCEDURE — 36415 COLL VENOUS BLD VENIPUNCTURE: CPT | Performed by: OBSTETRICS & GYNECOLOGY

## 2018-10-11 PROCEDURE — 99207 ZZC POST PARTUM EXAM: CPT | Performed by: OBSTETRICS & GYNECOLOGY

## 2018-10-11 PROCEDURE — G0145 SCR C/V CYTO,THINLAYER,RESCR: HCPCS | Performed by: OBSTETRICS & GYNECOLOGY

## 2018-10-11 PROCEDURE — 84443 ASSAY THYROID STIM HORMONE: CPT | Performed by: OBSTETRICS & GYNECOLOGY

## 2018-10-11 PROCEDURE — 87624 HPV HI-RISK TYP POOLED RSLT: CPT | Performed by: OBSTETRICS & GYNECOLOGY

## 2018-10-11 ASSESSMENT — ANXIETY QUESTIONNAIRES
IF YOU CHECKED OFF ANY PROBLEMS ON THIS QUESTIONNAIRE, HOW DIFFICULT HAVE THESE PROBLEMS MADE IT FOR YOU TO DO YOUR WORK, TAKE CARE OF THINGS AT HOME, OR GET ALONG WITH OTHER PEOPLE: SOMEWHAT DIFFICULT
2. NOT BEING ABLE TO STOP OR CONTROL WORRYING: SEVERAL DAYS
7. FEELING AFRAID AS IF SOMETHING AWFUL MIGHT HAPPEN: SEVERAL DAYS
1. FEELING NERVOUS, ANXIOUS, OR ON EDGE: SEVERAL DAYS
6. BECOMING EASILY ANNOYED OR IRRITABLE: SEVERAL DAYS
3. WORRYING TOO MUCH ABOUT DIFFERENT THINGS: NOT AT ALL
GAD7 TOTAL SCORE: 4
5. BEING SO RESTLESS THAT IT IS HARD TO SIT STILL: NOT AT ALL

## 2018-10-11 ASSESSMENT — PATIENT HEALTH QUESTIONNAIRE - PHQ9: 5. POOR APPETITE OR OVEREATING: NOT AT ALL

## 2018-10-11 NOTE — MR AVS SNAPSHOT
After Visit Summary   10/11/2018    Dave Meyer    MRN: [id number]           Patient Information     Date Of Birth          1990        Visit Information        Provider Department      10/11/2018 9:40 AM Ingris Briones,  [facility]        Today's Diagnoses     Routine postpartum follow-up    -  1    Other specified hypothyroidism        Cervical cancer screening           Follow-ups after your visit        Follow-up notes from your care team     Return in about 1 year (around 10/11/2019).      Who to contact     If you have questions or need follow up information about today's clinic visit or your schedule please contact [facility] directly at [phone].  Normal or non-critical lab and imaging results will be communicated to you by MyChart, letter or phone within 4 business days after the clinic has received the results. If you do not hear from us within 7 days, please contact the clinic through [employer] or phone. If you have a critical or abnormal lab result, we will notify you by phone as soon as possible.  Submit refill requests through [employer] or call your pharmacy and they will forward the refill request to us. Please allow 3 business days for your refill to be completed.          Additional Information About Your Visit        MyChart Information     [employer] gives you secure access to your electronic health record. If you see a primary care provider, you can also send messages to your care team and make appointments. If you have questions, please call your primary care clinic.  If you do not have a primary care provider, please call [phone] and they will assist you.        Care EveryWhere ID     This is your Care EveryWhere ID. This could be used by other organizations to access your [place] medical records  [id number]        Your Vitals Were     Pulse Height Last Period Breastfeeding? BMI (Body Mass Index)       60  "5' 0.5\" (1.537 m) 11/15/2017 Yes 26.55 kg/m2        Blood Pressure from Last 3 Encounters:   10/11/18 96/60   08/20/18 106/60   08/13/18 100/58    Weight from Last 3 Encounters:   10/11/18 138 lb 3.2 oz (62.7 kg)   08/18/18 161 lb (73 kg)   08/13/18 161 lb (73 kg)              We Performed the Following     HPV High Risk Types DNA Cervical     Pap imaged thin layer screen with HPV - recommended age 30 - 65     POSTPARTUM CARE VISIT     TSH with free T4 reflex          Today's Medication Changes          These changes are accurate as of 10/11/18 11:59 PM.  If you have any questions, ask your nurse or doctor.               These medicines have changed or have updated prescriptions.        Dose/Directions    levothyroxine 50 MCG tablet   Commonly known as:  SYNTHROID/LEVOTHROID   This may have changed:  how much to take   Used for:  Other specified hypothyroidism        Dose:  100 mcg   Take 2 tablets (100 mcg) by mouth daily   Quantity:  90 tablet   Refills:  3                Primary Care Provider Office Phone # Fax #    Ingris Armstrong Masters, [phone] [address] [address]        Equal Access to Services     LULU FERRO AH: Deidre Covington, wadellada tereza, qaybta kaalmada adenettada, alyssa whittington. So [facility] [phone].    ATENCIÓN: Si habla español, tiene a cedeno disposición servicios gratuitos de asistencia lingüística. Llame al [phone].    We comply with applicable federal civil rights laws and [place] laws. We do not discriminate on the basis of race, color, national origin, age, disability, sex, sexual orientation, or gender identity.            Thank you!     Thank you for choosing [facility]  for your care. Our goal is always to provide you with excellent care. Hearing back from our patients is one way we can continue to improve our services. Please take a few minutes to complete the written survey " that you may receive in the mail after your visit with us. Thank you!             Your Updated Medication List - Protect others around you: Learn how to safely use, store and throw away your medicines at www.disposemymeds.org.          This list is accurate as of 10/11/18 11:59 PM.  Always use your most recent med list.                   Brand Name Dispense Instructions for use Diagnosis    citalopram 40 MG tablet    celeXA     TK 1 T PO QD        EPINEPHrine 0.3 MG/0.3ML injection 2-pack    EPIPEN/ADRENACLICK/or ANY BX GENERIC EQUIV    2 mL    Inject 0.3 mLs (0.3 mg) into the muscle as needed for anaphylaxis    Allergy to bee sting       levothyroxine 50 MCG tablet    SYNTHROID/LEVOTHROID    90 tablet    Take 2 tablets (100 mcg) by mouth daily    Other specified hypothyroidism       PRENATAL VITAMIN PO

## 2018-10-11 NOTE — PROGRESS NOTES
"  SUBJECTIVE:  Dave Meyer,  is here for a postpartum visit.  She had a vaginal-Vacuum delivery on 18 delivering a healthy baby boy, named Maximino weighing 7 lbs 15 oz at term.      HPI:  Getting OK sleep. Is pumping and nursing. Has good support/help.   Was having some tailbone pain, is much improved, only feels slightly with sitting.   Plans to do condoms for contraception.     Last PHQ-9 score on record=   PHQ-9 SCORE 10/11/2018   Total Score 2     AGA-7 SCORE 2017 2018 10/11/2018   Total Score 19 10 4       Delivery complications:  Yes, Had to use the vacuum  Breast feeding:  Yes, Pumping  Bladder problems:  No  Bowel problems/hemorrhoids:  No  Episiotomy/laceration/incision healed? Yes  Vaginal flow:  None  [place]:  No  Contraception: condoms  Emotional adjustment:  doing well, happy, tired, and some pain in the tailbone when sitting  Back to work:  2019    12 point review of systems negative other than symptoms noted below.  Genitourinary: Pelvic Pain    OBJECTIVE:  Vitals: BP 96/60  Pulse 60  Ht 5' 0.5\" (1.537 m)  Wt 138 lb 3.2 oz (62.7 kg)  LMP 11/15/2017  Breastfeeding? Yes  BMI 26.55 kg/m2  BMI= Body mass index is 26.55 kg/(m^2).  General - pleasant female in no acute distress.  Breast -  symmetric, no skin changes and no puckering  Abdomen - soft, ND, NT  Pelvic - EG: normal adult female, BUS: within normal limits, Vagina: well rugated, no discharge, Cervix: no lesions or CMT, Uterus: firm, normal sized and nontender   Adnexae: no masses or tenderness.  Rectovaginal - deferred.    ASSESSMENT:    ICD-10-CM    1. Routine postpartum follow-up Z39.2 Pap imaged thin layer screen with HPV - recommended age 30 - 65     HPV High Risk Types DNA Cervical     TSH with free T4 reflex     POSTPARTUM CARE VISIT   2. Other specified hypothyroidism E03.8 Pap imaged thin layer screen with HPV - recommended age 30 - 65     HPV High Risk Types DNA Cervical     TSH " with free T4 reflex     POSTPARTUM CARE VISIT   3. Cervical cancer screening Z12.4 Pap imaged thin layer screen with HPV - recommended age 30 - 65     HPV High Risk Types DNA Cervical     TSH with free T4 reflex     POSTPARTUM CARE VISIT       PLAN:  May resume normal activities without restrictions.  Pap smear was done today.  Check TFTs  Full counseling was provided, and all questions were answered.   Return to clinic in one year for an annual visit.   Cont pnv  Plans condoms for contraception. Discussed ability to conceive early in PP period even despite breast feeding      Ingris Armstrong Masters, DO

## 2018-10-12 LAB — TSH SERPL DL<=0.005 MIU/L-ACNC: 1.08 MU/L (ref 0.4–4)

## 2018-10-12 ASSESSMENT — ANXIETY QUESTIONNAIRES: GAD7 TOTAL SCORE: 4

## 2018-10-12 ASSESSMENT — PATIENT HEALTH QUESTIONNAIRE - PHQ9: SUM OF ALL RESPONSES TO PHQ QUESTIONS 1-9: 2

## 2018-10-15 LAB
COPATH REPORT: NORMAL
PAP: NORMAL

## 2018-10-17 LAB
FINAL DIAGNOSIS: NORMAL
HPV HR 12 DNA CVX QL NAA+PROBE: NEGATIVE
HPV16 DNA SPEC QL NAA+PROBE: NEGATIVE
HPV18 DNA SPEC QL NAA+PROBE: NEGATIVE
SPECIMEN DESCRIPTION: NORMAL
SPECIMEN SOURCE CVX/VAG CYTO: NORMAL

## 2018-10-21 PROBLEM — O09.90 SUPERVISION OF HIGH-RISK PREGNANCY: Status: RESOLVED | Noted: 2018-01-22 | Resolved: 2018-10-21

## 2018-12-10 ENCOUNTER — TELEPHONE (OUTPATIENT)
Dept: OBGYN | Facility: CLINIC | Age: 28
End: 2018-12-10

## 2018-12-10 NOTE — TELEPHONE ENCOUNTER
Patient is 4 months post partum and has questions about breast pain, left breast while pumping, sensitive to the touch.

## 2018-12-10 NOTE — TELEPHONE ENCOUNTER
8/18/18 AVD  Exclusively pumping  12/8/18 she noted a small lump in the Left breast behind the nipple and at the 6 o'clock wiley. She was able to massage and move the milk and decrease the size of the lump. Today she woke with body aches and temp of 100.0 po  Area has slight redness and warmth to it and painful to touch    Routing to Dr. Briones to advise on tx or need to be seen. Will call pt with her response.    JIN  Selected preferred pharmacy

## 2018-12-10 NOTE — TELEPHONE ENCOUNTER
Called pt with recommendations from Dr. Briones below. Encouraged continued massage, trying ibuprofen up to 600 mg every 6 hours for general aching and for the swelling of the breast, heat to help keep milk moving and ice for local discomfort. Call if sx's worsen and accompany a temp 100.4 or higher.    The patient indicates understanding of these issues and agrees with the plan.

## 2018-12-10 NOTE — TELEPHONE ENCOUNTER
Continue to monitor temperature. If goes higher than 100.4, call back. Otherwise continue with massage, regular nursing/pumping.    Ingris Armstrong Masters, DO

## 2018-12-11 ENCOUNTER — NURSE TRIAGE (OUTPATIENT)
Dept: NURSING | Facility: CLINIC | Age: 28
End: 2018-12-11

## 2018-12-11 ENCOUNTER — TELEPHONE (OUTPATIENT)
Dept: OBGYN | Facility: CLINIC | Age: 28
End: 2018-12-11

## 2018-12-11 DIAGNOSIS — N61.0 MASTITIS: Primary | ICD-10-CM

## 2018-12-11 RX ORDER — CEPHALEXIN 500 MG/1
500 CAPSULE ORAL 2 TIMES DAILY
Qty: 14 CAPSULE | Refills: 0 | Status: SHIPPED | OUTPATIENT
Start: 2018-12-11 | End: 2018-12-18

## 2018-12-11 NOTE — TELEPHONE ENCOUNTER
Lft msg for pt to call back regarding symptoms.  Adrianna Hernandez RN on 12/11/2018 at 10:52 AM

## 2018-12-11 NOTE — TELEPHONE ENCOUNTER
If having redness, tenderness and fever can assume Mastitis. Rx keflex 500mg BID for 7 days. If still having fevers in 48 hours needs exam

## 2018-12-11 NOTE — TELEPHONE ENCOUNTER
Pt notified of rx for keflex, pt knows to make an OV if not better in 48 hours.  Adrianna Hernandez RN on 12/11/2018 at 11:32 AM

## 2018-12-11 NOTE — TELEPHONE ENCOUNTER
Clinic Action Needed:  Yes, callback  [facility] Triage Call  Presenting Problem:    Dave is having tenderness and a lot of soreness in left breast when pumping and also has a low fever of 100.  Last night fever reached 101.2.  Dave delivered four months ago 8/18/2018.  Dave is requesting to speak with MD Ingris Briones.  Please phone Dave at [phone].      Routed to:  RN Pool  Please be sure to close this encounter once this patient's issue/question has been addressed.    Mattie Kennedy RN/[facility]    --------------------------------------------------------------------------------------------------------------------------------  Spoke with pt who reports also redness to her L breast. Nipple intact. Pumping every two-three hours and emptying breasts. Advised tylenol and ibuprofen, staying well hydrated.     Routing to covering provider to advise if antibiotics would be appropriate. JIN Hernandez RN on 12/11/2018 at 11:06 AM

## 2019-01-13 ENCOUNTER — MYC MEDICAL ADVICE (OUTPATIENT)
Dept: OBGYN | Facility: CLINIC | Age: 29
End: 2019-01-13

## 2019-01-13 DIAGNOSIS — F41.9 ANXIETY: Primary | ICD-10-CM

## 2019-01-14 RX ORDER — CITALOPRAM HYDROBROMIDE 40 MG/1
TABLET ORAL
Qty: 30 TABLET | Refills: 0 | Status: SHIPPED | OUTPATIENT
Start: 2019-01-14 | End: 2021-01-25

## 2020-01-19 ENCOUNTER — APPOINTMENT (OUTPATIENT)
Dept: ULTRASOUND IMAGING | Facility: CLINIC | Age: 30
End: 2020-01-19
Attending: EMERGENCY MEDICINE
Payer: COMMERCIAL

## 2020-01-19 ENCOUNTER — HOSPITAL ENCOUNTER (EMERGENCY)
Facility: CLINIC | Age: 30
Discharge: HOME | End: 2020-01-20
Attending: EMERGENCY MEDICINE | Admitting: EMERGENCY MEDICINE
Payer: COMMERCIAL

## 2020-01-19 DIAGNOSIS — N83.02 FOLLICULAR CYST OF LEFT OVARY: ICD-10-CM

## 2020-01-19 LAB
ALBUMIN UR-MCNC: 10 MG/DL
APPEARANCE UR: CLEAR
BASOPHILS # BLD AUTO: 0 10E9/L (ref 0–0.2)
BASOPHILS NFR BLD AUTO: 0.2 %
BILIRUB UR QL STRIP: NEGATIVE
COLOR UR AUTO: YELLOW
DIFFERENTIAL METHOD BLD: ABNORMAL
EOSINOPHIL # BLD AUTO: 0 10E9/L (ref 0–0.7)
EOSINOPHIL NFR BLD AUTO: 1 %
ERYTHROCYTE [DISTWIDTH] IN BLOOD BY AUTOMATED COUNT: 13.1 % (ref 10–15)
GLUCOSE UR STRIP-MCNC: NEGATIVE MG/DL
HCG UR QL: NEGATIVE
HCT VFR BLD AUTO: 35.5 % (ref 35–47)
HGB BLD-MCNC: 12.1 G/DL (ref 11.7–15.7)
HGB UR QL STRIP: NEGATIVE
IMM GRANULOCYTES # BLD: 0 10E9/L (ref 0–0.4)
IMM GRANULOCYTES NFR BLD: 0.2 %
KETONES UR STRIP-MCNC: 5 MG/DL
LEUKOCYTE ESTERASE UR QL STRIP: ABNORMAL
LYMPHOCYTES # BLD AUTO: 1 10E9/L (ref 0.8–5.3)
LYMPHOCYTES NFR BLD AUTO: 24.3 %
MCH RBC QN AUTO: 29.7 PG (ref 26.5–33)
MCHC RBC AUTO-ENTMCNC: 34.1 G/DL (ref 31.5–36.5)
MCV RBC AUTO: 87 FL (ref 78–100)
MONOCYTES # BLD AUTO: 0.6 10E9/L (ref 0–1.3)
MONOCYTES NFR BLD AUTO: 13.8 %
MUCOUS THREADS #/AREA URNS LPF: PRESENT /LPF
NEUTROPHILS # BLD AUTO: 2.5 10E9/L (ref 1.6–8.3)
NEUTROPHILS NFR BLD AUTO: 60.5 %
NITRATE UR QL: NEGATIVE
NRBC # BLD AUTO: 0 10*3/UL
NRBC BLD AUTO-RTO: 0 /100
PH UR STRIP: 5.5 PH (ref 5–7)
PLATELET # BLD AUTO: 113 10E9/L (ref 150–450)
RBC # BLD AUTO: 4.08 10E12/L (ref 3.8–5.2)
RBC #/AREA URNS AUTO: 1 /HPF (ref 0–2)
SOURCE: ABNORMAL
SP GR UR STRIP: 1.03 (ref 1–1.03)
SQUAMOUS #/AREA URNS AUTO: 11 /HPF (ref 0–1)
UROBILINOGEN UR STRIP-MCNC: NORMAL MG/DL (ref 0–2)
WBC # BLD AUTO: 4.1 10E9/L (ref 4–11)
WBC #/AREA URNS AUTO: 5 /HPF (ref 0–5)

## 2020-01-19 PROCEDURE — 96374 THER/PROPH/DIAG INJ IV PUSH: CPT

## 2020-01-19 PROCEDURE — 81025 URINE PREGNANCY TEST: CPT | Performed by: EMERGENCY MEDICINE

## 2020-01-19 PROCEDURE — 80053 COMPREHEN METABOLIC PANEL: CPT | Performed by: EMERGENCY MEDICINE

## 2020-01-19 PROCEDURE — 99285 EMERGENCY DEPT VISIT HI MDM: CPT | Mod: 25

## 2020-01-19 PROCEDURE — 81001 URINALYSIS AUTO W/SCOPE: CPT | Performed by: EMERGENCY MEDICINE

## 2020-01-19 PROCEDURE — 76830 TRANSVAGINAL US NON-OB: CPT

## 2020-01-19 PROCEDURE — 85025 COMPLETE CBC W/AUTO DIFF WBC: CPT | Performed by: EMERGENCY MEDICINE

## 2020-01-19 RX ORDER — MORPHINE SULFATE 4 MG/ML
4 INJECTION, SOLUTION INTRAMUSCULAR; INTRAVENOUS ONCE
Status: COMPLETED | OUTPATIENT
Start: 2020-01-19 | End: 2020-01-20

## 2020-01-19 ASSESSMENT — ENCOUNTER SYMPTOMS
BLOOD IN STOOL: 0
BACK PAIN: 0
NAUSEA: 1
FREQUENCY: 0
VOMITING: 0
DYSURIA: 0
ABDOMINAL PAIN: 1
DIAPHORESIS: 1
HEMATURIA: 0

## 2020-01-19 ASSESSMENT — MIFFLIN-ST. JEOR: SCORE: 1201.23

## 2020-01-19 NOTE — ED AVS SNAPSHOT
Emergency Department  [address] [place] [id number]  Phone:  [phone]  Fax:  [phone]                                    Dave Meyer   MRN: [id number]    Department:   Emergency Department   Date of Visit:  1/19/2020           After Visit Summary Signature Page    I have received my discharge instructions, and my questions have been answered. I have discussed any challenges I see with this plan with the nurse or doctor.    ..........................................................................................................................................  Patient/Patient Representative Signature      ..........................................................................................................................................  Patient Representative Print Name and Relationship to Patient    ..................................................               ................................................  Date                                   Time    ..........................................................................................................................................  Reviewed by Signature/Title    ...................................................              ..............................................  Date                                               Time          22EPIC Rev 08/18

## 2020-01-20 VITALS
DIASTOLIC BLOOD PRESSURE: 61 MMHG | WEIGHT: 121 LBS | OXYGEN SATURATION: 98 % | TEMPERATURE: 98.1 F | HEIGHT: 61 IN | BODY MASS INDEX: 22.84 KG/M2 | HEART RATE: 64 BPM | SYSTOLIC BLOOD PRESSURE: 96 MMHG | RESPIRATION RATE: 18 BRPM

## 2020-01-20 LAB
ALBUMIN SERPL-MCNC: 3.6 G/DL (ref 3.4–5)
ALP SERPL-CCNC: 60 U/L (ref 40–150)
ALT SERPL W P-5'-P-CCNC: 21 U/L (ref 0–50)
ANION GAP SERPL CALCULATED.3IONS-SCNC: 5 MMOL/L (ref 3–14)
AST SERPL W P-5'-P-CCNC: 15 U/L (ref 0–45)
BILIRUB SERPL-MCNC: 1.3 MG/DL (ref 0.2–1.3)
BUN SERPL-MCNC: 18 MG/DL (ref 7–30)
CALCIUM SERPL-MCNC: 8.3 MG/DL (ref 8.5–10.1)
CHLORIDE SERPL-SCNC: 104 MMOL/L (ref 94–109)
CO2 SERPL-SCNC: 26 MMOL/L (ref 20–32)
CREAT SERPL-MCNC: 0.65 MG/DL (ref 0.52–1.04)
GFR SERPL CREATININE-BSD FRML MDRD: >90 ML/MIN/{1.73_M2}
GLUCOSE SERPL-MCNC: 106 MG/DL (ref 70–99)
POTASSIUM SERPL-SCNC: 3.4 MMOL/L (ref 3.4–5.3)
PROT SERPL-MCNC: 6.6 G/DL (ref 6.8–8.8)
SODIUM SERPL-SCNC: 135 MMOL/L (ref 133–144)

## 2020-01-20 PROCEDURE — 25000128 H RX IP 250 OP 636: Performed by: EMERGENCY MEDICINE

## 2020-01-20 PROCEDURE — 96374 THER/PROPH/DIAG INJ IV PUSH: CPT

## 2020-01-20 PROCEDURE — 25000132 ZZH RX MED GY IP 250 OP 250 PS 637: Performed by: EMERGENCY MEDICINE

## 2020-01-20 RX ORDER — IBUPROFEN 600 MG/1
600 TABLET, FILM COATED ORAL ONCE
Status: COMPLETED | OUTPATIENT
Start: 2020-01-20 | End: 2020-01-20

## 2020-01-20 RX ORDER — ACETAMINOPHEN 500 MG
1000 TABLET ORAL ONCE
Status: COMPLETED | OUTPATIENT
Start: 2020-01-20 | End: 2020-01-20

## 2020-01-20 RX ADMIN — ACETAMINOPHEN 1000 MG: 500 TABLET, FILM COATED ORAL at 00:50

## 2020-01-20 RX ADMIN — IBUPROFEN 600 MG: 600 TABLET ORAL at 00:50

## 2020-01-20 RX ADMIN — MORPHINE SULFATE 4 MG: 4 INJECTION INTRAVENOUS at 00:15

## 2020-01-20 NOTE — ED TRIAGE NOTES
bilateral lower abdominal pain started about 1 hour ago that radiates into upper thighs. some nausea

## 2020-01-20 NOTE — ED PROVIDER NOTES
History     Chief Complaint:  Abdominal pain     HPI   Dave Meyer is a 29 year old female who presents for evaluation of sudden onset bilateral lower abdominal pain, left worse than right, beginning one hour ago. Patient reports that she initially felt nauseous, so she went into her bathroom when she experienced intense low abdominal pain that radiated to her groin with associated diaphoresis. Her pain has been constant, with intermittent worsening pain that comes in waves.  She notes that this intense pain lasted 35-40 minutes in duration and says this felt similar to labor pains. Since she has been here, pain has resolved some, but is still present. Patient has not taken any medications for her pain. No history of similar pain in the past, though she does remark of multiple episodes of emesis a few weeks ago which she believes was likely gastritis. Denies emesis, dysuria, difficulty urinating, bloody stools, diarrhea, back pain, or any other concerns.     Allergies:  NKDA     Medications:    Celexa  Synthroid  Epi pen     Past Medical History:    Acne  Anovular menstruation   Depression with anxiety  Hypothyroidism   PCOS  Recurrent pregnancy loss    Past Surgical History:    History reviewed. No pertinent surgical history.     Family History:    Hypothyroidism   DM 2  Depression     Social History:  Smoking status: never   Alcohol use: yes   Drug use: no   PCP: Ingris Briones  Marital Status:        Review of Systems   Constitutional: Positive for diaphoresis.   Gastrointestinal: Positive for abdominal pain and nausea. Negative for blood in stool and vomiting.   Genitourinary: Negative for dysuria, frequency, hematuria and urgency.   Musculoskeletal: Negative for back pain.   All other systems reviewed and are negative.        Physical Exam     Patient Vitals for the past 24 hrs:   BP Temp Temp src Pulse Heart Rate Resp SpO2 Height Weight   01/20/20 0056 96/61 -- -- -- 69 18 98 % --  "--   01/20/20 0029 -- -- -- -- -- 18 -- -- --   01/20/20 0000 102/64 -- -- 64 -- -- -- -- --   01/19/20 2241 (!) 89/60 98.1  F (36.7  C) Oral -- 80 18 100 % 1.549 m (5' 1\") 54.9 kg (121 lb)        Physical Exam  General: Appears well-developed and well-nourished.   Head: No signs of trauma.   CV: Normal rate and regular rhythm.    Resp: Effort normal and breath sounds normal. No respiratory distress.   GI: Soft. There is LLQ tenderness.  No rebound or guarding.  Normal bowel sounds.  No CVA tenderness.  MSK: Normal range of motion. no edema. No Calf tenderness.  Neuro: The patient is alert and oriented.  Speech normal.  GCS 15  Skin: Skin is warm and dry. No rash noted.   Psych: normal mood and affect. behavior is normal.       Emergency Department Course     Imaging:  Radiographic findings were communicated with the patient who voiced understanding of the findings.    US Pelvic Complete w Transvaginal   1. No acute findings.  2. 2.4 cm dominant follicle on the left.  As read by Radiology.    Laboratory:  UA: ketones 5, albumin 10, leukocyte esterase small, squamous epithelial 11 (H), mucous present, o/w negative   HCG qual: negative    CMP: Glucose 106 (H), calcium 8.3 (L), protein 6.6 (L), o/w WNL (Creatinine: 0.65)  CBC: WBC 4.1, HGB 12.1,  (L)     Interventions:  0015: morphine 4 mg IV  0050: Tylenol 1000 mg PO  0050: ibuprofen 600 mg PO    Emergency Department Course:  2315: Nursing notes and vitals reviewed. I performed an exam of the patient as documented above.     Medicine administered as documented above. Blood drawn. This was sent to the lab for further testing, results above. The patient provided a urine sample here in the emergency department. This was sent for laboratory testing, findings above.      The patient was sent for a pelvic ultrasound while in the emergency department, findings above.     0043: I rechecked the patient and discussed the results of her workup thus far.     Findings and " plan explained to the Patient. Patient discharged home with instructions regarding supportive care, medications, and reasons to return. The importance of close follow-up was reviewed.     I personally reviewed the laboratory results with the Patient and answered all related questions prior to discharge.         Impression & Plan      Medical Decision Making:  Dave Meyer is a 29-year-old woman who presents due to lower abdominal pain.  On my evaluation, her pain is primarily in the left lower quadrant.  She was not peritoneal.  Blood work obtained that was non-concerning and ultrasound was obtained that did show a left ovarian follicle.  This may be the etiology of her symptoms.  On repeat evaluation she continued to not have any peritoneal findings.  Clinically, I have low suspicion for appendicitis, diverticulitis, or other significant intra-abdominal process.  I did discuss the pros and cons of a CT, given the radiation exposure, the patient was comfortable with foregoing this as well.  She was recommended continue with Tylenol and ibuprofen along with heating pad and is recommended to follow-up with her primary care doctor and return if she had worsening pain, fevers, or any other concerns.       Diagnosis:    ICD-10-CM    1. Follicular cyst of left ovary N83.02        Disposition:  discharged to home    IGriselda, am serving as a scribe at 11:15 PM on 1/19/2020 to document services personally performed by Juancarlos Wells MD based on my observations and the provider's statements to me.      Griselda Parr  1/19/2020    EMERGENCY DEPARTMENT       Juancarlos Wells MD  01/20/20 8982

## 2020-02-11 NOTE — PROGRESS NOTES
"HPI Comments:   Patient has a history of depression with anxiety and was previously on Celexa which worked well for her.    She has become increasingly depressed and anxious lately. Depression manifests as lack of interest in previously enjoyed activities, fatigue, being very emotional. Denies suicidal ideation. Anxiety manifests as racing thoughts and this makes it difficult for her to initiate sleep.      Past Medical History:   Diagnosis Date     Acne     On spironolactone until 7/15     Anovular menstruation      Hypothyroidism      PCOS (polycystic ovarian syndrome)        Review of Systems   Constitutional: Negative for malaise/fatigue and weight loss.   Eyes: Negative for blurred vision.   Cardiovascular: Negative for chest pain and palpitations.   Gastrointestinal: Negative for abdominal pain, constipation, diarrhea, nausea and vomiting.   Musculoskeletal: Negative for myalgias.   Neurological: Negative for dizziness, tremors and headaches.   Psychiatric/Behavioral: Positive for depression. Negative for hallucinations and suicidal ideas. The patient is nervous/anxious and has insomnia (due to anxiety).        /85 (BP Location: Left arm, Cuff Size: Adult Regular)  Pulse 64  Temp 99.3  F (37.4  C) (Oral)  Ht 5' 1\" (1.549 m)  Wt 118 lb (53.5 kg)  SpO2 100%  BMI 22.3 kg/m2      Physical Exam   Constitutional: She is oriented to person, place, and time. No distress.   Neck: No thyromegaly present.   Cardiovascular: Normal rate, regular rhythm and normal heart sounds.    Neurological: She is alert and oriented to person, place, and time. GCS score is 15.   Psychiatric: Affect normal.   Depressed mood   Vitals reviewed.        ICD-10-CM    1. Depression with anxiety F41.8 citalopram (CELEXA) 20 MG tablet     ALPRAZolam (XANAX) 0.5 MG tablet       Patient Instructions   Call doctor if your depression/anxiety persist/worsens, or if you develop new symptoms or side effects from the medication.    Follow up " in 1 month.         toileting/standing/walking

## 2020-03-10 ENCOUNTER — HEALTH MAINTENANCE LETTER (OUTPATIENT)
Age: 30
End: 2020-03-10

## 2020-06-24 NOTE — PROGRESS NOTES
"S: Pt doing well. Infant is being  but is still fairly sleepy so has only latched well 2x. Pain is controlled with current analgesics.  Medication(s) being used: acetaminophen and ibuprofen (OTC).     O: BP 95/62  Pulse 74  Temp 98.2  F (36.8  C)  Resp 16  Ht 1.549 m (5' 1\")  Wt 73 kg (161 lb)  LMP 11/15/2017  SpO2 99%  Breastfeeding? Unknown  BMI 30.42 kg/m2        ABD:  Uterine fundus is firm, non-tender and at the level of the umbilicus                Hemoglobin   Date Value Ref Range Status   08/18/2018 12.5 11.7 - 15.7 g/dL Final            Lab Results   Component Value Date    RH Pos 08/18/2018       A:  Post-partum day #1 s/p Vacuum extraction    P: Continue PP Cares  Anticipated d/c tomorrow      " regular

## 2020-12-27 ENCOUNTER — HEALTH MAINTENANCE LETTER (OUTPATIENT)
Age: 30
End: 2020-12-27

## 2021-01-11 ENCOUNTER — TELEPHONE (OUTPATIENT)
Dept: OBGYN | Facility: CLINIC | Age: 31
End: 2021-01-11

## 2021-01-11 NOTE — TELEPHONE ENCOUNTER
Prior Authorization Retail Medication Request  CoverMyMeds Key: [id number]    Medication/Dose: progesterone (ENDOMETRIN) 100 MG vaginal insert  ICD code: Recurrent pregnancy loss with current pregnancy [O26.20]  - Primary   Previously Tried and Failed:  N/A  Rationale:  Advised to take in early pregnancy until 10 weeks gestation due to history of bleeding with pregnancy loss    Insurance Name:  [facility] [employer]  Insurance ID:  [id number]

## 2021-01-12 ENCOUNTER — TELEPHONE (OUTPATIENT)
Dept: OBGYN | Facility: CLINIC | Age: 31
End: 2021-01-12

## 2021-01-12 DIAGNOSIS — O26.21 HISTORY OF RECURRENT ABORTION, CURRENTLY PREGNANT IN FIRST TRIMESTER: Primary | ICD-10-CM

## 2021-01-12 NOTE — TELEPHONE ENCOUNTER
Yes, she can have an US at 6-7 weeks, knowing that early utlrasounds may not see much and may need to be reapeated. But with her hx, it is reasonable to get an early one to establish viability.   [facility] would not be able to offer her anything at this time as she is already pregnant.     Ingris Armstrong Masters, DO

## 2021-01-12 NOTE — TELEPHONE ENCOUNTER
Pt advised and transferred to scheduling. No further questions  Kasia Gold RN on 1/12/2021 at 12:14 PM

## 2021-01-12 NOTE — TELEPHONE ENCOUNTER
2 IVF miscarriages ealry  Natural pregnancy with her first son - started at [facility] at 7 weeks for an early US to check on how things were  She is newly pregnant and wants to know if Dr Briones recommends an early US? Does she need to start the process at [facility] again?      LMP 12/13/20 - 4w2d - [place], hoping for 2nd, but not actively;   No bleeding/cramping. +UPT. Mild nausea.    Routing to Dr Briones to advise.    Kasia Gold RN on 1/12/2021 at 10:46 AM

## 2021-01-13 NOTE — TELEPHONE ENCOUNTER
PA Initiation    Medication: progesterone (ENDOMETRIN) 100 MG vaginal insert   Insurance Company: [employer] - Phone [phone] Fax [phone]  Pharmacy Filling the Rx: [facility] -- [place], [place] - [address]  Filling Pharmacy Phone: [phone]  Filling Pharmacy Fax: [phone]  Start Date: 1/13/2021

## 2021-01-25 ENCOUNTER — NURSE TRIAGE (OUTPATIENT)
Dept: NURSING | Facility: CLINIC | Age: 31
End: 2021-01-25

## 2021-01-25 ENCOUNTER — TELEPHONE (OUTPATIENT)
Dept: OBGYN | Facility: CLINIC | Age: 31
End: 2021-01-25

## 2021-01-25 NOTE — TELEPHONE ENCOUNTER
12/13/20 6w1d  A Positive    Started last evening experienced a light pink with wiping  Mild cramping as well  Today is more scant brown in vaginal discharge.  Has hx of SAB'sx3 then full term delivery  Also on vaginal prometrium   Reassured pt the pink discharge and mild cramping can be common in 1st trimester. Reassured not bright red, or filling underwear. Continue to monitor and hydrate. Nothing in vagina, minus her medication.  She has viability US scheduled 1/29/21 which will be perfect timing for reassurance. Call with any bright red bleeding and cramping/pain.    Pt verbalized understanding, in agreement with plan, and voiced no further questions.  Kasia Gold RN on 1/25/2021 at 8:18 AM

## 2021-01-25 NOTE — TELEPHONE ENCOUNTER
Dave is calling and states that she is over 6 weeks pregnant and last night started having a pink discharge when wiping.  Slight cramping is present.  Dave has a history of miscarriages.  Spotting started last night.  Has not been 48 hours yet.  Today Dave is requesting to see if she can get in sooner as she has an appointment on Friday, Jan 29th.  Denies dizziness, severe abdominal pain.    COVID 19 Nurse Triage Plan/Patient Instructions    Please be aware that novel coronavirus (COVID-19) may be circulating in the community. If you develop symptoms such as fever, cough, or SOB or if you have concerns about the presence of another infection including coronavirus (COVID-19), please contact your health care provider or visit [email or web address].     Disposition/Instructions    In-Person Visit with provider recommended. Reference Visit Selection Guide.    Thank you for taking steps to prevent the spread of this virus.  o Limit your contact with others.  o Wear a simple mask to cover your cough.  o Wash your hands well and often.    Resources    [facility]: About COVID-19: [email or web address]    CDC: What to Do If You're Sick: www.cdc.gov/coronavirus/2019-ncov/about/steps-when-sick.html    CDC: Ending Home Isolation: www.cdc.gov/coronavirus/2019-ncov/hcp/disposition-in-home-patients.html     CDC: Caring for Someone: www.cdc.gov/coronavirus/2019-ncov/if-you-are-sick/care-for-someone.html     [facility]: Interim Guidance for Hospital Discharge to Home: www.health.[facility].[place]/diseases/coronavirus/hcp/hospdischarge.pdf    [facility] clinical trials (COVID-19 research studies): [email or web address].[facility]-clinical-trials     Below are the COVID-19 hotlines at the [place] Department of Health ([facility]). Interpreters are available.   o For health questions: Call 614-328-8324 or 1-808.717.8683 (7 a.m. to 7 p.m.)  o For questions about schools and childcare: Call 801-067-2750 or 1-680.177.8463 (7 a.m. to 7  "p.m.)                       Additional Information    Negative: Shock suspected (e.g., cold/pale/clammy skin, too weak to stand, low BP, rapid pulse)    Negative: Difficult to awaken or acting confused (e.g., disoriented, slurred speech)    Negative: Passed out (i.e., lost consciousness, collapsed and was not responding)    Negative: Sounds like a life-threatening emergency to the triager    Negative: SEVERE abdominal pain    Negative: [1] SEVERE vaginal bleeding (i.e., soaking 2 pads / hour, large blood clots) AND [2] present 2 or more hours    Negative: SEVERE dizziness (e.g., unable to stand, requires support to walk, feels like passing out)    Negative: [1] MODERATE vaginal bleeding (i.e., soaking 1 pad / hour; clots) AND [2] present > 6 hours    Negative: [1] MODERATE vaginal bleeding (i.e., soaking 1 pad / hour; clots) AND [2] pregnant > 12 weeks    Negative: Passed tissue (e.g., gray-white)    Negative: Shoulder pain    Negative: Pale skin (pallor) of new onset or worsening    Negative: Patient sounds very sick or weak to the triager    Negative: [1] Constant abdominal pain AND [2] present > 2 hours    Negative: Fever > 100.4 F (38.0 C)    Negative: [1] Intermittent lower abdominal pain (e.g., cramping) AND [2] present > 24 hours    Negative: Prior history of \"ectopic pregnancy\" or previous tubal surgery (e.g., tubal ligation)    Negative: Pain or burning with passing urine (urination)    Negative: MODERATE vaginal bleeding (i.e., soaking 1 pad / hour; clots)    Negative: Has IUD    Negative: MILD vaginal bleeding (i.e., less than 1 pad / hour; less than patient's usual menstrual bleeding; not just spotting)    SPOTTING lasts > 48 hours or spotting happens more than once in a week    Protocols used: PREGNANCY - VAGINAL BLEEDING LESS THAN 20 WEEKS EGA-A-AH      "

## 2021-01-25 NOTE — TELEPHONE ENCOUNTER
See telephone encounter 1/25/21 - spoke with patient and reassured. Has apt 1/29/21  Kasia Gold RN on 1/25/2021 at 8:20 AM

## 2021-01-29 ENCOUNTER — ANCILLARY PROCEDURE (OUTPATIENT)
Dept: ULTRASOUND IMAGING | Facility: CLINIC | Age: 31
End: 2021-01-29
Attending: OBSTETRICS & GYNECOLOGY
Payer: COMMERCIAL

## 2021-01-29 ENCOUNTER — PRENATAL OFFICE VISIT (OUTPATIENT)
Dept: OBGYN | Facility: CLINIC | Age: 31
End: 2021-01-29
Attending: OBSTETRICS & GYNECOLOGY
Payer: COMMERCIAL

## 2021-01-29 VITALS
HEIGHT: 61 IN | HEART RATE: 70 BPM | WEIGHT: 135 LBS | SYSTOLIC BLOOD PRESSURE: 106 MMHG | DIASTOLIC BLOOD PRESSURE: 60 MMHG | BODY MASS INDEX: 25.49 KG/M2

## 2021-01-29 DIAGNOSIS — O26.21 HISTORY OF RECURRENT ABORTION, CURRENTLY PREGNANT IN FIRST TRIMESTER: ICD-10-CM

## 2021-01-29 DIAGNOSIS — O26.21 RECURRENT PREGNANCY LOSS IN PREGNANT PATIENT IN FIRST TRIMESTER, ANTEPARTUM: Primary | ICD-10-CM

## 2021-01-29 PROCEDURE — 76817 TRANSVAGINAL US OBSTETRIC: CPT | Performed by: OBSTETRICS & GYNECOLOGY

## 2021-01-29 PROCEDURE — 99213 OFFICE O/P EST LOW 20 MIN: CPT | Performed by: OBSTETRICS & GYNECOLOGY

## 2021-01-29 RX ORDER — BUSPIRONE HYDROCHLORIDE 10 MG/1
20 TABLET ORAL 2 TIMES DAILY
COMMUNITY
Start: 2020-04-18 | End: 2022-06-15

## 2021-01-29 SDOH — HEALTH STABILITY: MENTAL HEALTH: HOW MANY STANDARD DRINKS CONTAINING ALCOHOL DO YOU HAVE ON A TYPICAL DAY?: NOT ASKED

## 2021-01-29 SDOH — HEALTH STABILITY: MENTAL HEALTH: HOW OFTEN DO YOU HAVE A DRINK CONTAINING ALCOHOL?: NOT ASKED

## 2021-01-29 SDOH — HEALTH STABILITY: MENTAL HEALTH: HOW OFTEN DO YOU HAVE 6 OR MORE DRINKS ON ONE OCCASION?: NOT ASKED

## 2021-01-29 ASSESSMENT — ANXIETY QUESTIONNAIRES
5. BEING SO RESTLESS THAT IT IS HARD TO SIT STILL: NOT AT ALL
IF YOU CHECKED OFF ANY PROBLEMS ON THIS QUESTIONNAIRE, HOW DIFFICULT HAVE THESE PROBLEMS MADE IT FOR YOU TO DO YOUR WORK, TAKE CARE OF THINGS AT HOME, OR GET ALONG WITH OTHER PEOPLE: SOMEWHAT DIFFICULT
2. NOT BEING ABLE TO STOP OR CONTROL WORRYING: SEVERAL DAYS
GAD7 TOTAL SCORE: 5
6. BECOMING EASILY ANNOYED OR IRRITABLE: NOT AT ALL
1. FEELING NERVOUS, ANXIOUS, OR ON EDGE: SEVERAL DAYS
3. WORRYING TOO MUCH ABOUT DIFFERENT THINGS: SEVERAL DAYS
7. FEELING AFRAID AS IF SOMETHING AWFUL MIGHT HAPPEN: SEVERAL DAYS

## 2021-01-29 ASSESSMENT — PATIENT HEALTH QUESTIONNAIRE - PHQ9
5. POOR APPETITE OR OVEREATING: SEVERAL DAYS
SUM OF ALL RESPONSES TO PHQ QUESTIONS 1-9: 5

## 2021-01-29 ASSESSMENT — MIFFLIN-ST. JEOR: SCORE: 1259.74

## 2021-01-29 NOTE — PROGRESS NOTES
SUBJECTIVE:                                                   Dave Meyer is a 30 year old female who presents to clinic today for the following health issue(s):  Patient presents with:  Ultrasound: Viability US         HPI:  Sure LMP, typically q 27ish days. Periods were regular.   Having evening nausea. NO vomiting.     TSh tested last April and was wnl. Reviewed care everywhwere, not present. Pt states was online    Hx recurrent pregnancy loss, last early first tri loss summer 2020. On progesterone.    Completed review of PMH, SocHx, SurHx, FHx, medications, and care everywhere reviewed. Epic updated.           Patient's last menstrual period was 2020..     Patient is sexually active, .  Using none for contraception.    reports that she has never smoked. She has never used smokeless tobacco.    STD testing offered?  Declined    Health maintenance updated:  yes    Today's PHQ-2 Score:   PHQ-2 (  [employer]) 2021   Q1: Little interest or pleasure in doing things 1   Q2: Feeling down, depressed or hopeless 1   PHQ-2 Score 2   Q1: Little interest or pleasure in doing things -   Q2: Feeling down, depressed or hopeless -   PHQ-2 Score -     Today's PHQ-9 Score:   PHQ-9 SCORE 2021   PHQ-9 Total Score 5     Today's AGA-7 Score:   AGA-7 SCORE 2021   Total Score 5       Problem list and histories reviewed & adjusted, as indicated.  Additional history: as documented.    Patient Active Problem List   Diagnosis     PCOS (polycystic ovarian syndrome)     Infertility associated with anovulation     Depression with anxiety     Hypothyroidism     Indication for care in labor or delivery     Pregnancy     Past Surgical History:   Procedure Laterality Date     NO HISTORY OF SURGERY        Social History     Tobacco Use     Smoking status: Never Smoker     Smokeless tobacco: Never Used   Substance Use Topics     Alcohol use: Not Currently     Comment: social      Problem (# of  "Occurrences) Relation (Name,Age of Onset)    Depression (3) Sister, Brother, Sister    Diabetes Type 2  (1) Mother    Hypothyroidism (4) Mother, Sister, Maternal Grandmother, Maternal Aunt            Current Outpatient Medications   Medication Sig     busPIRone (BUSPAR) 10 MG tablet      EPINEPHrine (EPIPEN/ADRENACLICK/OR ANY BX GENERIC EQUIV) 0.3 MG/0.3ML injection 2-pack Inject 0.3 mLs (0.3 mg) into the muscle as needed for anaphylaxis     levothyroxine (SYNTHROID/LEVOTHROID) 50 MCG tablet Take 2 tablets (100 mcg) by mouth daily (Patient taking differently: Take 50 mcg by mouth daily )     progesterone (ENDOMETRIN) 100 MG vaginal insert Place 1 suppository (100 mg) vaginally 3 times daily Until 10 weeks gestation     sertraline (ZOLOFT) 50 MG tablet      No current facility-administered medications for this visit.      No Known Allergies    ROS:  12 point review of systems negative other than symptoms noted below or in the HPI.  No urinary frequency or dysuria, bladder or kidney problems      OBJECTIVE:     /60   Pulse 70   Ht 1.549 m (5' 1\")   Wt 61.2 kg (135 lb)   LMP 12/13/2020   BMI 25.51 kg/m    Body mass index is 25.51 kg/m .    Exam:  Constitutional:  Appearance: Well nourished, well developed alert, in no acute distress  Neurologic:  Mental Status:  Oriented X3.  Normal strength and tone, sensory exam grossly normal, mentation intact and speech normal.    Psychiatric:  Mentation appears normal and affect normal/bright.     In-Clinic Test Results:  Results for orders placed or performed in visit on 01/29/21 (from the past 24 hour(s))   US OB Transvaginal Only    Narrative       Obstetrical Ultrasound Report  OB U/S  < 14 Weeks -  Transvaginal  [facility]  Referring Provider: Ingris Briones DO  Sonographer: Rickey Martin RDMS  Indication:  Viability check , history recurrent pregnancy loss     Dating (mm/dd/yyyy):   LMP: 12/13/2020            EDC:  09/19/2021            GA by " LMP:           6w5d     Current Scan On:  1/29/2021          EDC:  09/24/2021            GA by Current Scan:          6w0d  The calculation of the gestational age by current scan was based on CRL.  Anatomy Scan:  Hernandez gestation.  Biometry:  CRL                       0.35 cm                6w0d                      Yolk Sac               0.24 mm                                                                  Fetal heart activity:  Rate and rhythm is within normal limits.  Fetal   heart rate: 110 bpm  Findings: Single viable IUP, difficult to obtain FHR due to EGA     Maternal Structures:  Cervix: The cervix appears long and closed.  Right Ovary: Wnl  Left Ovary: Corpus luteum    Impression:   Viable hernandez gestation at 6 weeks 5 days. US today concurs with   patient's LMP dating giving final EDC of 9/19/21. Normal adnexa.   Would recommend repeat viability ultrasound in 7-10 days.   Ingris Briones, DO                        ASSESSMENT/PLAN:                                                        ICD-10-CM    1. Recurrent pregnancy loss in pregnant patient in first trimester, antepartum  O26.21 US OB Transvaginal Only       -Reviewed US with Dave, early 6wk IUP. Considering her hx recurrent losses, and very early nature of this pregnancy rec repeating US in 7-10d. Is a 5d difference with LMP dating, however, this may be due to the early nature of the IUP.   Questions answered.  Discussed tx of nausea, handout given.     Ingris Damians, DO  [facility]

## 2021-01-30 ASSESSMENT — ANXIETY QUESTIONNAIRES: GAD7 TOTAL SCORE: 5

## 2021-02-08 ENCOUNTER — OFFICE VISIT (OUTPATIENT)
Dept: OBGYN | Facility: CLINIC | Age: 31
End: 2021-02-08
Attending: OBSTETRICS & GYNECOLOGY
Payer: COMMERCIAL

## 2021-02-08 ENCOUNTER — ANCILLARY PROCEDURE (OUTPATIENT)
Dept: ULTRASOUND IMAGING | Facility: CLINIC | Age: 31
End: 2021-02-08
Attending: OBSTETRICS & GYNECOLOGY
Payer: COMMERCIAL

## 2021-02-08 VITALS
DIASTOLIC BLOOD PRESSURE: 60 MMHG | SYSTOLIC BLOOD PRESSURE: 92 MMHG | BODY MASS INDEX: 25.49 KG/M2 | WEIGHT: 135 LBS | HEIGHT: 61 IN

## 2021-02-08 DIAGNOSIS — O26.21 PREGNANCY COMPLICATED BY PREVIOUS RECURRENT MISCARRIAGES, FIRST TRIMESTER: Primary | ICD-10-CM

## 2021-02-08 DIAGNOSIS — Z3A.08 8 WEEKS GESTATION OF PREGNANCY: ICD-10-CM

## 2021-02-08 DIAGNOSIS — O26.21 RECURRENT PREGNANCY LOSS IN PREGNANT PATIENT IN FIRST TRIMESTER, ANTEPARTUM: ICD-10-CM

## 2021-02-08 PROCEDURE — 99213 OFFICE O/P EST LOW 20 MIN: CPT | Performed by: OBSTETRICS & GYNECOLOGY

## 2021-02-08 PROCEDURE — 76817 TRANSVAGINAL US OBSTETRIC: CPT | Performed by: OBSTETRICS & GYNECOLOGY

## 2021-02-08 RX ORDER — LEVOTHYROXINE SODIUM 75 UG/1
75 TABLET ORAL DAILY
COMMUNITY
End: 2021-02-17

## 2021-02-08 ASSESSMENT — MIFFLIN-ST. JEOR: SCORE: 1259.74

## 2021-02-08 NOTE — PROGRESS NOTES
SUBJECTIVE:                                                   Dave Meyer is a 30 year old female who presents to clinic today for the following health issue(s):  Patient presents with:  Ultrasound: viability      HPI:  Has questions about covid vaccine. Clyde has already received his two doses. Son rafiq goes to . Her school is distance learning yet, but will have option go to back in perso,  into hybrid teaching    SOme light spotting, dark blood    Completed review of PMH, SocHx, SurHx, FHx, medications, and care everywhere reviewed. Epic updated.      Patient's last menstrual period was 2020..   Patient is sexually active, .  Using nothing for contraception, currently pregnant.   reports that she has never smoked. She has never used smokeless tobacco.  Health maintenance updated:  yes    Today's PHQ-2 Score:   PHQ-2 (  [employer]) 2021   Q1: Little interest or pleasure in doing things 1   Q2: Feeling down, depressed or hopeless 1   PHQ-2 Score 2   Q1: Little interest or pleasure in doing things -   Q2: Feeling down, depressed or hopeless -   PHQ-2 Score -     Today's PHQ-9 Score:   PHQ-9 SCORE 2021   PHQ-9 Total Score MyChart 6 (Mild depression)   PHQ-9 Total Score 6     Today's AGA-7 Score:   AGA-7 SCORE 2021   Total Score 6 (mild anxiety)   Total Score 6       Problem list and histories reviewed & adjusted, as indicated.  Additional history: as documented.    Patient Active Problem List   Diagnosis     PCOS (polycystic ovarian syndrome)     Infertility associated with anovulation     Depression with anxiety     Hypothyroidism     Indication for care in labor or delivery     Pregnancy     Past Surgical History:   Procedure Laterality Date     NO HISTORY OF SURGERY        Social History     Tobacco Use     Smoking status: Never Smoker     Smokeless tobacco: Never Used   Substance Use Topics     Alcohol use: Not Currently     Comment: social      Problem (# of  "Occurrences) Relation (Name,Age of Onset)    Depression (3) Sister, Brother, Sister    Diabetes Type 2  (1) Mother    Hypothyroidism (4) Mother, Sister, Maternal Grandmother, Maternal Aunt            Current Outpatient Medications   Medication Sig     busPIRone (BUSPAR) 10 MG tablet      EPINEPHrine (EPIPEN/ADRENACLICK/OR ANY BX GENERIC EQUIV) 0.3 MG/0.3ML injection 2-pack Inject 0.3 mLs (0.3 mg) into the muscle as needed for anaphylaxis     levothyroxine (SYNTHROID/LEVOTHROID) 75 MCG tablet Take 75 mcg by mouth daily     Prenatal Vit-Fe Fumarate-FA (PRENATAL VITAMIN PO)      sertraline (ZOLOFT) 50 MG tablet      progesterone (ENDOMETRIN) 100 MG vaginal insert Place 1 suppository (100 mg) vaginally 3 times daily Until 10 weeks gestation     No current facility-administered medications for this visit.      No Known Allergies    ROS:  12 point review of systems negative other than symptoms noted below or in the HPI.  No urinary frequency or dysuria, bladder or kidney problems      OBJECTIVE:     BP 92/60   Ht 1.549 m (5' 1\")   Wt 61.2 kg (135 lb)   LMP 12/14/2020   BMI 25.51 kg/m    Body mass index is 25.51 kg/m .    Exam:  Constitutional:  Appearance: Well nourished, well developed alert, in no acute distress  Chest:  Respiratory Effort:  Breathing unlabored.   Neurologic:  Mental Status:  Oriented X3.  Normal strength and tone, sensory exam grossly normal, mentation intact and speech normal.    Psychiatric:  Mentation appears normal and affect normal/bright.     In-Clinic Test Results:  Results for orders placed or performed in visit on 02/08/21 (from the past 24 hour(s))   US OB Transvaginal Only    Narrative       Obstetrical Ultrasound Report  OB U/S  < 14 Weeks -  Transvaginal  [facility]  Referring Provider: Ingris Briones DO  Sonographer: Rickey Martin RDMS  Indication:  Viability check      Dating (mm/dd/yyriccoy):   LMP: 12/14/2020            EDC:  09/20/2021            GA by LMP:       "     8w0d     Current Scan On:  2/8/2021             EDC:  09/24/2021            GA by Current Scan:          7w3d  The calculation of the gestational age by current scan was based on CRL.  Anatomy Scan:  Hernandez gestation.  Biometry:  CRL                       1.24 cm                7w3d                      Yolk Sac               NA                                                              Fetal heart activity:  Rate and rhythm is within normal limits.  Fetal   heart rate: 155 bpm  Findings: Single viable IUP     Maternal Structures:  Cervix: The cervix appears long and closed.  Right Ovary: Wnl  Left Ovary: Corpus luteum    Impression:   Viable hernandez gestation at 8 weeks 0 days. US today concurs with   patient's LMP dating giving final EDC of 9/20/21. Normal adnexa.   Recommend anatomy US at 18-22 weeks gestation.  Ingris Damians, DO                        ASSESSMENT/PLAN:                                                        ICD-10-CM    1. Pregnancy complicated by previous recurrent miscarriages, first trimester  O26.21 ABO/Rh type and screen     CBC with platelets differential     Hepatitis B surface antigen     HIV Antigen Antibody Combo     Non Invasive Prenatal Test Cell Free DNA     Rubella Antibody IgG Quantitative     UA with Microscopic     Urine Culture Aerobic Bacterial   2. 8 weeks gestation of pregnancy  Z3A.08          -REviewed US findings with Dave, viable fetus, good growth.   Will cont to f/u q 2wks for her comfort/reassurance.   Discussed cfDNA testing for aneulploidy screening. Pt desires this. Will have her return in 2 wk for this and regular OB labs.   -Handout given on COVID. Would recommend it, is her option however.     Ingris Damians, DO  [facility]

## 2021-02-17 ENCOUNTER — MYC MEDICAL ADVICE (OUTPATIENT)
Dept: OBGYN | Facility: CLINIC | Age: 31
End: 2021-02-17

## 2021-02-17 DIAGNOSIS — E03.9 HYPOTHYROIDISM: Primary | ICD-10-CM

## 2021-02-17 NOTE — TELEPHONE ENCOUNTER
Lab appt 2/23/21  OV 2/25/21    Pt reported med:  Levothyroxine (SYNTHROID/LEVOTHRODI) 75 mcg tablet    14 day supply queued.    Routing mychart msg to provider for review/recommendation.    Maria E Ruiz RN on 2/17/2021 at 10:01 AM

## 2021-02-18 RX ORDER — LEVOTHYROXINE SODIUM 75 UG/1
75 TABLET ORAL DAILY
Qty: 30 TABLET | Refills: 0 | Status: SHIPPED | OUTPATIENT
Start: 2021-02-18 | End: 2021-03-19

## 2021-02-18 NOTE — TELEPHONE ENCOUNTER
Rx ok for refill per VO Dr Briones - Rx sent.  Pt rescheduled nurse phone visit prior to her NEW OB visit with DR Briones 2/25    Kasia Gold RN on 2/18/2021 at 12:41 PM

## 2021-02-23 DIAGNOSIS — O26.21 PREGNANCY COMPLICATED BY PREVIOUS RECURRENT MISCARRIAGES, FIRST TRIMESTER: ICD-10-CM

## 2021-02-23 LAB
ABO + RH BLD: NORMAL
ABO + RH BLD: NORMAL
ALBUMIN UR-MCNC: NEGATIVE MG/DL
APPEARANCE UR: CLEAR
BASOPHILS # BLD AUTO: 0 10E9/L (ref 0–0.2)
BASOPHILS NFR BLD AUTO: 0.2 %
BILIRUB UR QL STRIP: NEGATIVE
BLD GP AB SCN SERPL QL: NORMAL
BLOOD BANK CMNT PATIENT-IMP: NORMAL
COLOR UR AUTO: YELLOW
DIFFERENTIAL METHOD BLD: NORMAL
EOSINOPHIL # BLD AUTO: 0.1 10E9/L (ref 0–0.7)
EOSINOPHIL NFR BLD AUTO: 1.7 %
ERYTHROCYTE [DISTWIDTH] IN BLOOD BY AUTOMATED COUNT: 13.1 % (ref 10–15)
GLUCOSE UR STRIP-MCNC: 250 MG/DL
HBV SURFACE AG SERPL QL IA: NONREACTIVE
HCT VFR BLD AUTO: 36.7 % (ref 35–47)
HGB BLD-MCNC: 12.3 G/DL (ref 11.7–15.7)
HGB UR QL STRIP: NEGATIVE
HIV 1+2 AB+HIV1 P24 AG SERPL QL IA: NONREACTIVE
KETONES UR STRIP-MCNC: NEGATIVE MG/DL
LEUKOCYTE ESTERASE UR QL STRIP: NEGATIVE
LYMPHOCYTES # BLD AUTO: 1.3 10E9/L (ref 0.8–5.3)
LYMPHOCYTES NFR BLD AUTO: 29.1 %
MCH RBC QN AUTO: 29.8 PG (ref 26.5–33)
MCHC RBC AUTO-ENTMCNC: 33.5 G/DL (ref 31.5–36.5)
MCV RBC AUTO: 89 FL (ref 78–100)
MONOCYTES # BLD AUTO: 0.5 10E9/L (ref 0–1.3)
MONOCYTES NFR BLD AUTO: 10 %
NEUTROPHILS # BLD AUTO: 2.7 10E9/L (ref 1.6–8.3)
NEUTROPHILS NFR BLD AUTO: 59 %
NITRATE UR QL: NEGATIVE
NON-SQ EPI CELLS #/AREA URNS LPF: ABNORMAL /LPF
PH UR STRIP: 6 PH (ref 5–7)
PLATELET # BLD AUTO: 151 10E9/L (ref 150–450)
RBC # BLD AUTO: 4.13 10E12/L (ref 3.8–5.2)
RBC #/AREA URNS AUTO: ABNORMAL /HPF
SOURCE: ABNORMAL
SP GR UR STRIP: 1.01 (ref 1–1.03)
SPECIMEN EXP DATE BLD: NORMAL
T PALLIDUM AB SER QL: NONREACTIVE
UROBILINOGEN UR STRIP-ACNC: 0.2 EU/DL (ref 0.2–1)
WBC # BLD AUTO: 4.6 10E9/L (ref 4–11)
WBC #/AREA URNS AUTO: ABNORMAL /HPF

## 2021-02-23 PROCEDURE — 87389 HIV-1 AG W/HIV-1&-2 AB AG IA: CPT | Performed by: OBSTETRICS & GYNECOLOGY

## 2021-02-23 PROCEDURE — 86850 RBC ANTIBODY SCREEN: CPT | Performed by: OBSTETRICS & GYNECOLOGY

## 2021-02-23 PROCEDURE — 84443 ASSAY THYROID STIM HORMONE: CPT | Performed by: OBSTETRICS & GYNECOLOGY

## 2021-02-23 PROCEDURE — 36415 COLL VENOUS BLD VENIPUNCTURE: CPT | Performed by: OBSTETRICS & GYNECOLOGY

## 2021-02-23 PROCEDURE — 99N1100 PR STATISTIC VERIFI PRENATAL TRISOMY 21,18,13: Mod: 90 | Performed by: OBSTETRICS & GYNECOLOGY

## 2021-02-23 PROCEDURE — 87086 URINE CULTURE/COLONY COUNT: CPT | Performed by: OBSTETRICS & GYNECOLOGY

## 2021-02-23 PROCEDURE — 86762 RUBELLA ANTIBODY: CPT | Performed by: OBSTETRICS & GYNECOLOGY

## 2021-02-23 PROCEDURE — 85025 COMPLETE CBC W/AUTO DIFF WBC: CPT | Performed by: OBSTETRICS & GYNECOLOGY

## 2021-02-23 PROCEDURE — 86901 BLOOD TYPING SEROLOGIC RH(D): CPT | Performed by: OBSTETRICS & GYNECOLOGY

## 2021-02-23 PROCEDURE — 81001 URINALYSIS AUTO W/SCOPE: CPT | Performed by: OBSTETRICS & GYNECOLOGY

## 2021-02-23 PROCEDURE — 99000 SPECIMEN HANDLING OFFICE-LAB: CPT | Performed by: OBSTETRICS & GYNECOLOGY

## 2021-02-23 PROCEDURE — 87340 HEPATITIS B SURFACE AG IA: CPT | Performed by: OBSTETRICS & GYNECOLOGY

## 2021-02-23 PROCEDURE — 86900 BLOOD TYPING SEROLOGIC ABO: CPT | Performed by: OBSTETRICS & GYNECOLOGY

## 2021-02-23 PROCEDURE — 86780 TREPONEMA PALLIDUM: CPT | Mod: 90 | Performed by: OBSTETRICS & GYNECOLOGY

## 2021-02-24 ENCOUNTER — ALLIED HEALTH/NURSE VISIT (OUTPATIENT)
Dept: NURSING | Facility: CLINIC | Age: 31
End: 2021-02-24
Payer: COMMERCIAL

## 2021-02-24 DIAGNOSIS — O09.90 SUPERVISION OF HIGH-RISK PREGNANCY: Primary | ICD-10-CM

## 2021-02-24 PROBLEM — R42 VERTIGO: Status: ACTIVE | Noted: 2020-11-28

## 2021-02-24 LAB
BACTERIA SPEC CULT: NO GROWTH
Lab: NORMAL
RUBV IGG SERPL IA-ACNC: 46 IU/ML
SPECIMEN SOURCE: NORMAL
TSH SERPL DL<=0.005 MIU/L-ACNC: 1.45 MU/L (ref 0.4–4)

## 2021-02-24 PROCEDURE — 99207 PR NO CHARGE NURSE ONLY: CPT

## 2021-02-24 NOTE — ADDENDUM NOTE
Addended by: RADHA COYNE on: 2/24/2021 01:31 PM     Modules accepted: Orders     admits to smoking 4 large joints daily

## 2021-02-24 NOTE — PROGRESS NOTES
SUBJECTIVE:     HPI:    This is a 30 year old female patient,  who presents for her first obstetrical visit.    YOHAN: 2021, by Last Menstrual Period.  She is 10w2d weeks.  Her cycles are regular.  Her last menstrual period was normal.   Since her LMP, she has experienced  nausea, fatigue and food aversion).   She denies emesis, abdominal pain, headache, loss of appetite, vaginal discharge, dysuria, pelvic pain, urinary urgency, lightheadedness, urinary frequency, vaginal bleeding, hemorrhoids and constipation.    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      She has a history of  Term Vag pregnancy-vac asst    Since her last LMP she denies use of alcohol, tobacco and street drugs.    Past medical, surgical, social and family history were reviewed and updated in [facility].        Current Outpatient Medications   Medication     busPIRone (BUSPAR) 10 MG tablet     EPINEPHrine (EPIPEN/ADRENACLICK/OR ANY BX GENERIC EQUIV) 0.3 MG/0.3ML injection 2-pack     levothyroxine (SYNTHROID/LEVOTHROID) 75 MCG tablet     Prenatal Vit-Fe Fumarate-FA (PRENATAL VITAMIN PO)     progesterone (ENDOMETRIN) 100 MG vaginal insert     sertraline (ZOLOFT) 50 MG tablet     No current facility-administered medications for this visit.         ROS:   12 point review of systems negative other than symptoms noted below or in the HPI.  Constitutional: Fatigue and Loss of Appetite-food aversion  Gastrointestinal: Nausea      OBJECTIVE:     EXAM:  LMP 12/14/2020  There is no height or weight on file to calculate BMI.        Nurse phone visit completed. Prenatal book and folder (containing standard educational hand-outs and brochures)  will be given at the next visit to patient. Information in folder reviewed over the phone. Questions answered.  Pt advised to call the clinic if she has any questions or concerns related to her pregnancy. Prenatal labs future ordered. New prenatal visit scheduled on 2/25/21 with .      Lab Results   Component Value Date    PAP NIL 10/11/2018     PHQ-9 score:    PHQ 2/8/2021   PHQ-9 Total Score 6   Q9: Thoughts of better off dead/self-harm past 2 weeks Not at all         AGA-7 SCORE 10/11/2018 1/29/2021 2/8/2021   Total Score - - 6 (mild anxiety)   Total Score 4 5 6       Patient supplied answers from flow sheet for:  Prenatal OB Questionnaire.          Roxana Adamson RN

## 2021-02-25 ENCOUNTER — PRENATAL OFFICE VISIT (OUTPATIENT)
Dept: OBGYN | Facility: CLINIC | Age: 31
End: 2021-02-25
Payer: COMMERCIAL

## 2021-02-25 VITALS
HEIGHT: 61 IN | WEIGHT: 137.6 LBS | BODY MASS INDEX: 25.98 KG/M2 | DIASTOLIC BLOOD PRESSURE: 60 MMHG | SYSTOLIC BLOOD PRESSURE: 90 MMHG

## 2021-02-25 DIAGNOSIS — Z87.42 HISTORY OF PCOS: ICD-10-CM

## 2021-02-25 DIAGNOSIS — O26.21 HISTORY OF RECURRENT ABORTION, CURRENTLY PREGNANT IN FIRST TRIMESTER: ICD-10-CM

## 2021-02-25 DIAGNOSIS — Z11.8 SCREENING FOR CHLAMYDIAL DISEASE: ICD-10-CM

## 2021-02-25 DIAGNOSIS — R81 GLUCOSURIA: ICD-10-CM

## 2021-02-25 DIAGNOSIS — E07.9 DISORDER OF THYROID, ANTEPARTUM: ICD-10-CM

## 2021-02-25 DIAGNOSIS — O99.340: ICD-10-CM

## 2021-02-25 DIAGNOSIS — Z11.3 SCREEN FOR STD (SEXUALLY TRANSMITTED DISEASE): ICD-10-CM

## 2021-02-25 DIAGNOSIS — F32.9: ICD-10-CM

## 2021-02-25 DIAGNOSIS — O09.93 SUPERVISION OF HIGH RISK PREGNANCY IN THIRD TRIMESTER: Primary | ICD-10-CM

## 2021-02-25 DIAGNOSIS — O99.280 DISORDER OF THYROID, ANTEPARTUM: ICD-10-CM

## 2021-02-25 LAB — HBA1C MFR BLD: 5.2 % (ref 0–5.6)

## 2021-02-25 PROCEDURE — 87591 N.GONORRHOEAE DNA AMP PROB: CPT | Performed by: OBSTETRICS & GYNECOLOGY

## 2021-02-25 PROCEDURE — 83036 HEMOGLOBIN GLYCOSYLATED A1C: CPT | Performed by: OBSTETRICS & GYNECOLOGY

## 2021-02-25 PROCEDURE — 36415 COLL VENOUS BLD VENIPUNCTURE: CPT | Performed by: OBSTETRICS & GYNECOLOGY

## 2021-02-25 PROCEDURE — 99207 PR FIRST OB VISIT: CPT | Performed by: OBSTETRICS & GYNECOLOGY

## 2021-02-25 PROCEDURE — 87491 CHLMYD TRACH DNA AMP PROBE: CPT | Performed by: OBSTETRICS & GYNECOLOGY

## 2021-02-25 ASSESSMENT — MIFFLIN-ST. JEOR: SCORE: 1271.53

## 2021-02-25 NOTE — PROGRESS NOTES
HPI:     This is a 30 year old female patient,  who presents for her first obstetrical visit.     YOHAN: 2021, by Last Menstrual Period.  She is 10w3d weeks.  Her cycles are regular.  Her last menstrual period was normal.   Since her LMP, she has experienced  nausea, fatigue and food aversion).   She denies emesis, abdominal pain, headache, loss of appetite, vaginal discharge, dysuria, pelvic pain, urinary urgency, lightheadedness, urinary frequency, vaginal bleeding, hemorrhoids and constipation.     Additional History:   Prior 8wk US.     Working remotely currently. Scheduled for covid vaccine this weekend.   Taking progesterone.     Hx PCOS, no prior GDM  Hypothyroidism on levothyroxine, Depression with anxiety-on meds , Hx SABx3      Completed review of PMH, SocHx, SurHx, FHx, medications, and care everywhere reviewed. Epic updated.      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       She has a history of  Term Vag pregnancy-vac asst     Since her last LMP she denies use of alcohol, tobacco and street drugs.     Past medical, surgical, social and family history were reviewed and updated in EPIC.               Current Outpatient Medications   Medication     busPIRone (BUSPAR) 10 MG tablet     EPINEPHrine (EPIPEN/ADRENACLICK/OR ANY BX GENERIC EQUIV)  "0.3 MG/0.3ML injection 2-pack     levothyroxine (SYNTHROID/LEVOTHROID) 75 MCG tablet     Prenatal Vit-Fe Fumarate-FA (PRENATAL VITAMIN PO)     progesterone (ENDOMETRIN) 100 MG vaginal insert     sertraline (ZOLOFT) 50 MG tablet      No current facility-administered medications for this visit.          ROS:   12 point review of systems negative other than symptoms noted below or in the HPI.  Constitutional: Fatigue and Loss of Appetite-food aversion  Gastrointestinal: Nausea        OBJECTIVE:      EXAM:  LMP 12/14/2020      Vitals wnl                    Lab Results   Component Value Date     PAP NIL 10/11/2018      PHQ-9 score:    PHQ 2/8/2021   PHQ-9 Total Score 6   Q9: Thoughts of better off dead/self-harm past 2 weeks Not at all            AGA-7 SCORE 10/11/2018 1/29/2021 2/8/2021   Total Score - - 6 (mild anxiety)   Total Score 4 5 6               OBJECTIVE:     EXAM:  BP 90/60   Ht 1.549 m (5' 1\")   Wt 62.4 kg (137 lb 9.6 oz)   LMP 12/14/2020   BMI 26.00 kg/m   Body mass index is 26 kg/m .    GENERAL: healthy, alert and no distress  EYES: Eyes grossly normal to inspection, PERRL and conjunctivae and sclerae normal  HENT: ear canals and TM's normal, nose and mouth without ulcers or lesions  NECK: no adenopathy, no asymmetry, masses, or scars and thyroid normal to palpation  RESP: lungs clear to auscultation - no rales, rhonchi or wheezes  BREAST: normal without masses, tenderness or nipple discharge and no palpable axillary masses or adenopathy  CV: regular rate and rhythm, normal S1 S2, no S3 or S4, no murmur, click or rub, no peripheral edema and peripheral pulses strong  ABDOMEN: soft, nontender, no hepatosplenomegaly, no masses and bowel sounds normal   (female): normal female external genitalia, normal urethral meatus, vaginal mucosa pink, moist, well rugated, and normal cervix/adnexa/uterus without masses or discharge  MS: no gross musculoskeletal defects noted, no edema  SKIN: no suspicious lesions " or rashes  NEURO: Normal strength and tone, mentation intact and speech normal  PSYCH: mentation appears normal, affect normal/bright    ASSESSMENT/PLAN:       ICD-10-CM    1. Supervision of high risk pregnancy in third trimester  O09.93    2. History of recurrent , currently pregnant in first trimester  O26.21    3. Disorder of thyroid, antepartum  O99.280     E07.9    4. Major depressive disorder in mother affecting pregnancy  O99.340     F32.9    5. Glucosuria  R81 Hemoglobin A1c   6. History of PCOS  Z87.42 Hemoglobin A1c   7. Screen for STD (sexually transmitted disease)  Z11.3 NEISSERIA GONORRHOEA PCR   8. Screening for chlamydial disease  Z11.8 CHLAMYDIA TRACHOMATIS PCR       30 year old , On 2021 patient is 10w3d weeks of pregnancy with YOHAN of 2021, by Last Menstrual Period c/w 8wk US      PLAN/PATIENT INSTRUCTIONS:    -UTD cervical cancer screening.  -Discussed carrier and aneuploidy screening. Discussed what the disorders are the tests look for, what the risk factors may be for the conditions, what the testing options are, that they are elective and not required, and the information which is being obtained in these tests. Discussed why one might choose to do these screening tests or why they may not, how the information can be used. For aneuploidy testing, discussed screening test options, how screening differs from diagnostic testing, and what the diagnostic tests are. Discussed that a negative test does not eliminate the chance for a chromosomal or other genetic disorder to be present in the child. For pricing information, recommend pt contact [facility] directly to determine estimation of costs as they may not be covered by insurance. Patient chose to accept testing-drawn earlier this week.   -Reviewed labs with Dave. Wnl  -THyroid disorder in preg, cont current dose  -Depression in pregnancy. Cont zoloft, buspar.  -Hx RPL, cont progesterone to 12wk  -Glucosuria. Check  hgb a1c.   -miscarriage precautions reviewed             Ingris Armstrong Masters, DO

## 2021-02-28 LAB — LAB SCANNED RESULT: NORMAL

## 2021-03-01 ENCOUNTER — TELEPHONE (OUTPATIENT)
Dept: OBGYN | Facility: CLINIC | Age: 31
End: 2021-03-01

## 2021-03-19 ENCOUNTER — MYC REFILL (OUTPATIENT)
Dept: OBGYN | Facility: CLINIC | Age: 31
End: 2021-03-19

## 2021-03-19 DIAGNOSIS — E03.9 HYPOTHYROIDISM: ICD-10-CM

## 2021-03-19 RX ORDER — LEVOTHYROXINE SODIUM 75 UG/1
75 TABLET ORAL DAILY
Qty: 30 TABLET | Refills: 0 | Status: SHIPPED | OUTPATIENT
Start: 2021-03-19 | End: 2021-04-18

## 2021-03-19 NOTE — TELEPHONE ENCOUNTER
"Requested Prescriptions   Pending Prescriptions Disp Refills     levothyroxine (SYNTHROID/LEVOTHROID) 75 MCG tablet 30 tablet 0     Sig: Take 1 tablet (75 mcg) by mouth daily       Thyroid Protocol Failed - 3/19/2021  7:32 AM        Failed - No active pregnancy on record     If patient is pregnant or has had a positive pregnancy test, please check TSH.          Passed - Patient is 12 years or older        Passed - Recent (12 mo) or future (30 days) visit within the authorizing provider's specialty     Patient has had an office visit with the authorizing provider or a provider within the authorizing providers department within the previous 12 mos or has a future within next 30 days. See \"Patient Info\" tab in inbasket, or \"Choose Columns\" in Meds & Orders section of the refill encounter.              Passed - Medication is active on med list        Passed - Normal TSH on file in past 12 months     Recent Labs   Lab Test 02/23/21  0803   TSH 1.45              Passed - No positive pregnancy test in past 12 months     If patient is pregnant or has had a positive pregnancy test, please check TSH.             Prescription approved per [facility] Refill Protocol.  Adrianna Hernandez RN on 3/19/2021 at 8:13 AM    "

## 2021-03-22 ENCOUNTER — PRENATAL OFFICE VISIT (OUTPATIENT)
Dept: OBGYN | Facility: CLINIC | Age: 31
End: 2021-03-22
Payer: COMMERCIAL

## 2021-03-22 VITALS — BODY MASS INDEX: 27.02 KG/M2 | SYSTOLIC BLOOD PRESSURE: 98 MMHG | DIASTOLIC BLOOD PRESSURE: 62 MMHG | WEIGHT: 143 LBS

## 2021-03-22 DIAGNOSIS — E07.9 DISORDER OF THYROID, ANTEPARTUM: ICD-10-CM

## 2021-03-22 DIAGNOSIS — O99.280 DISORDER OF THYROID, ANTEPARTUM: ICD-10-CM

## 2021-03-22 DIAGNOSIS — O09.93 SUPERVISION OF HIGH RISK PREGNANCY IN THIRD TRIMESTER: Primary | ICD-10-CM

## 2021-03-22 PROCEDURE — 99207 PR PRENATAL VISIT: CPT | Performed by: OBSTETRICS & GYNECOLOGY

## 2021-04-14 ENCOUNTER — MYC MEDICAL ADVICE (OUTPATIENT)
Dept: OBGYN | Facility: CLINIC | Age: 31
End: 2021-04-14

## 2021-04-15 DIAGNOSIS — E07.9 DISORDER OF THYROID, ANTEPARTUM: ICD-10-CM

## 2021-04-15 DIAGNOSIS — O99.280 DISORDER OF THYROID, ANTEPARTUM: ICD-10-CM

## 2021-04-15 LAB — TSH SERPL DL<=0.005 MIU/L-ACNC: 1.98 MU/L (ref 0.4–4)

## 2021-04-15 PROCEDURE — 84443 ASSAY THYROID STIM HORMONE: CPT | Performed by: OBSTETRICS & GYNECOLOGY

## 2021-04-15 PROCEDURE — 36415 COLL VENOUS BLD VENIPUNCTURE: CPT | Performed by: OBSTETRICS & GYNECOLOGY

## 2021-04-18 DIAGNOSIS — E03.9 ACQUIRED HYPOTHYROIDISM: Primary | ICD-10-CM

## 2021-04-18 RX ORDER — LEVOTHYROXINE SODIUM 75 UG/1
75 TABLET ORAL DAILY
Qty: 90 TABLET | Refills: 4 | Status: SHIPPED | OUTPATIENT
Start: 2021-04-18 | End: 2021-08-20

## 2021-04-24 ENCOUNTER — HEALTH MAINTENANCE LETTER (OUTPATIENT)
Age: 31
End: 2021-04-24

## 2021-05-10 ENCOUNTER — PRENATAL OFFICE VISIT (OUTPATIENT)
Dept: OBGYN | Facility: CLINIC | Age: 31
End: 2021-05-10
Attending: OBSTETRICS & GYNECOLOGY
Payer: COMMERCIAL

## 2021-05-10 ENCOUNTER — ANCILLARY PROCEDURE (OUTPATIENT)
Dept: ULTRASOUND IMAGING | Facility: CLINIC | Age: 31
End: 2021-05-10
Attending: OBSTETRICS & GYNECOLOGY
Payer: COMMERCIAL

## 2021-05-10 VITALS — SYSTOLIC BLOOD PRESSURE: 100 MMHG | DIASTOLIC BLOOD PRESSURE: 60 MMHG | WEIGHT: 153 LBS | BODY MASS INDEX: 28.91 KG/M2

## 2021-05-10 DIAGNOSIS — O09.93 SUPERVISION OF HIGH RISK PREGNANCY IN THIRD TRIMESTER: Primary | ICD-10-CM

## 2021-05-10 DIAGNOSIS — O99.280 DISORDER OF THYROID, ANTEPARTUM: ICD-10-CM

## 2021-05-10 DIAGNOSIS — O09.93 SUPERVISION OF HIGH RISK PREGNANCY IN THIRD TRIMESTER: ICD-10-CM

## 2021-05-10 DIAGNOSIS — E07.9 DISORDER OF THYROID, ANTEPARTUM: ICD-10-CM

## 2021-05-10 DIAGNOSIS — O26.22 HISTORY OF RECURRENT ABORTION, CURRENTLY PREGNANT IN SECOND TRIMESTER: ICD-10-CM

## 2021-05-10 PROCEDURE — 76805 OB US >/= 14 WKS SNGL FETUS: CPT | Performed by: OBSTETRICS & GYNECOLOGY

## 2021-05-10 PROCEDURE — 99207 PR PRENATAL VISIT: CPT | Performed by: OBSTETRICS & GYNECOLOGY

## 2021-05-10 NOTE — PROGRESS NOTES
No loss of fluid/vaginal bleeding/regular contractions. + FM  -Reviewed normal anatomy US  - Labor precautions. F/U 4wk    Ingris Armstrong Masters, DO

## 2021-06-14 NOTE — TELEPHONE ENCOUNTER
Called home #, spoke with patient herself.  Informed of Palliative Care referral received from [facility]. Discussed palliative care program at length.     Per patient she wants to hold off on services, does not want to schedule at this time.    Offered brochure to be mailed to home for future reference, she agreed. Verified address. Encouraged to reach out to PCP in future for new order if in need of visits. She voiced understanding.    Palliative referral closed at this time.   Innatal results: neg  Fetal Fraction: 7 %    TEST RESULT INTERPRETATION   Chromosome 21 No aneuploidy detected  Results consistent with two copies of chromosome 21   Chromosome 18 No aneuploidy detected  Results consistent with two copies of chromosome 18   Chromosome 13 No aneuploidy detected  Results consistent with two copies of chromosome 13   Sex Chromosome No aneuploidy detected  Results consistent with two sex chromosomes:  female     Results received from [facility] testing in [place] [facility] triage.    Testing done:  Innatal Prenatal Screen    Action:  Spoke to patient and gave NORMAL results and routed to provider.     Gender:  Gender revealed to the patient on the phone. The gender is female    Pt verbalized understanding, in agreement with plan, and voiced no further questions.    Adrianna Hernandez RN

## 2021-06-24 RX ORDER — CLINDAMYCIN PHOSPHATE 11.9 MG/ML
SOLUTION TOPICAL
COMMUNITY
Start: 2021-03-26 | End: 2022-06-15

## 2021-06-24 RX ORDER — DULOXETIN HYDROCHLORIDE 30 MG/1
CAPSULE, DELAYED RELEASE ORAL
COMMUNITY
Start: 2021-03-16 | End: 2021-06-25

## 2021-06-25 ENCOUNTER — PRENATAL OFFICE VISIT (OUTPATIENT)
Dept: OBGYN | Facility: CLINIC | Age: 31
End: 2021-06-25
Payer: COMMERCIAL

## 2021-06-25 VITALS — DIASTOLIC BLOOD PRESSURE: 58 MMHG | SYSTOLIC BLOOD PRESSURE: 100 MMHG | WEIGHT: 158.4 LBS | BODY MASS INDEX: 29.93 KG/M2

## 2021-06-25 DIAGNOSIS — E07.9 THYROID DISEASE IN PREGNANCY IN THIRD TRIMESTER: ICD-10-CM

## 2021-06-25 DIAGNOSIS — O09.92 SUPERVISION OF HIGH RISK PREGNANCY IN SECOND TRIMESTER: Primary | ICD-10-CM

## 2021-06-25 DIAGNOSIS — F32.9: ICD-10-CM

## 2021-06-25 DIAGNOSIS — O99.283 THYROID DISEASE IN PREGNANCY IN THIRD TRIMESTER: ICD-10-CM

## 2021-06-25 DIAGNOSIS — O99.340: ICD-10-CM

## 2021-06-25 PROCEDURE — 99207 PR PRENATAL VISIT: CPT | Performed by: OBSTETRICS & GYNECOLOGY

## 2021-06-25 RX ORDER — AMOXICILLIN 500 MG/1
CAPSULE ORAL
COMMUNITY
Start: 2021-06-24 | End: 2021-07-19

## 2021-06-25 NOTE — PROGRESS NOTES
No loss of fluid/vaginal bleeding/regular contractions. + FM  -return next week for 28wk labs, lab appt only. Will check TFTs as well.   Pt accepts Tdap at that time as well.   - Labor precautions. F/U 3wk for next OB appt    Ingris Armstrong Masters, DO

## 2021-06-28 DIAGNOSIS — Z23 NEED FOR TDAP VACCINATION: ICD-10-CM

## 2021-06-28 DIAGNOSIS — Z36.9 ENCOUNTER FOR ANTENATAL SCREENING OF MOTHER: Primary | ICD-10-CM

## 2021-06-29 DIAGNOSIS — E07.9 THYROID DISEASE IN PREGNANCY IN THIRD TRIMESTER: ICD-10-CM

## 2021-06-29 DIAGNOSIS — Z36.9 ENCOUNTER FOR ANTENATAL SCREENING OF MOTHER: ICD-10-CM

## 2021-06-29 DIAGNOSIS — Z23 NEED FOR TDAP VACCINATION: ICD-10-CM

## 2021-06-29 DIAGNOSIS — O09.93 SUPERVISION OF HIGH RISK PREGNANCY IN THIRD TRIMESTER: ICD-10-CM

## 2021-06-29 DIAGNOSIS — O99.283 THYROID DISEASE IN PREGNANCY IN THIRD TRIMESTER: ICD-10-CM

## 2021-06-29 LAB
ERYTHROCYTE [DISTWIDTH] IN BLOOD BY AUTOMATED COUNT: 13.6 % (ref 10–15)
GLUCOSE 1H P 50 G GLC PO SERPL-MCNC: 92 MG/DL (ref 60–129)
HCT VFR BLD AUTO: 29.5 % (ref 35–47)
HGB BLD-MCNC: 9.3 G/DL (ref 11.7–15.7)
MCH RBC QN AUTO: 27.6 PG (ref 26.5–33)
MCHC RBC AUTO-ENTMCNC: 31.5 G/DL (ref 31.5–36.5)
MCV RBC AUTO: 88 FL (ref 78–100)
PLATELET # BLD AUTO: 160 10E9/L (ref 150–450)
RBC # BLD AUTO: 3.37 10E12/L (ref 3.8–5.2)
TSH SERPL DL<=0.005 MIU/L-ACNC: 0.68 MU/L (ref 0.4–4)
WBC # BLD AUTO: 5.9 10E9/L (ref 4–11)

## 2021-06-29 PROCEDURE — 84443 ASSAY THYROID STIM HORMONE: CPT | Performed by: OBSTETRICS & GYNECOLOGY

## 2021-06-29 PROCEDURE — 90471 IMMUNIZATION ADMIN: CPT

## 2021-06-29 PROCEDURE — 90715 TDAP VACCINE 7 YRS/> IM: CPT

## 2021-06-29 PROCEDURE — 36415 COLL VENOUS BLD VENIPUNCTURE: CPT | Performed by: OBSTETRICS & GYNECOLOGY

## 2021-06-29 PROCEDURE — 85027 COMPLETE CBC AUTOMATED: CPT | Performed by: OBSTETRICS & GYNECOLOGY

## 2021-06-29 PROCEDURE — 82950 GLUCOSE TEST: CPT | Performed by: OBSTETRICS & GYNECOLOGY

## 2021-06-29 NOTE — PROGRESS NOTES
Prior to immunization administration, verified patients identity using patient s name and date of birth. Please see Immunization Activity for additional information.     Screening Questionnaire for Adult Immunization    Are you sick today?   No   Do you have allergies to medications, food, a vaccine component or latex?   No   Have you ever had a serious reaction after receiving a vaccination?   No   Do you have a long-term health problem with heart, lung, kidney, or metabolic disease (e.g., diabetes), asthma, a blood disorder, no spleen, complement component deficiency, a cochlear implant, or a spinal fluid leak?  Are you on long-term aspirin therapy?   HYPOTHYROIDISM   Do you have cancer, leukemia, HIV/AIDS, or any other immune system problem?   No   Do you have a parent, brother, or sister with an immune system problem?   No   In the past 3 months, have you taken medications that affect  your immune system, such as prednisone, other steroids, or anticancer drugs; drugs for the treatment of rheumatoid arthritis, Crohn s disease, or psoriasis; or have you had radiation treatments?   No   Have you had a seizure, or a brain or other nervous system problem?   No   During the past year, have you received a transfusion of blood or blood    products, or been given immune (gamma) globulin or antiviral drug?   No   For women: Are you pregnant or is there a chance you could become       pregnant during the next month?   Yes   Have you received any vaccinations in the past 4 weeks?   No     Immunization questionnaire was positive for at least one answer.  Notified Dr. Briones.        Per orders of Dr. Briones, injection of tdap given by Carlie Bearden CMA. Patient instructed to remain in clinic for 15 minutes afterwards, and to report any adverse reaction to me immediately.       Screening performed by Carlie Bearden CMA on 6/29/2021 at 9:38 AM.

## 2021-07-19 ENCOUNTER — PRENATAL OFFICE VISIT (OUTPATIENT)
Dept: OBGYN | Facility: CLINIC | Age: 31
End: 2021-07-19
Payer: COMMERCIAL

## 2021-07-19 VITALS — SYSTOLIC BLOOD PRESSURE: 120 MMHG | DIASTOLIC BLOOD PRESSURE: 52 MMHG | WEIGHT: 160 LBS | BODY MASS INDEX: 30.23 KG/M2

## 2021-07-19 DIAGNOSIS — O99.280 DISORDER OF THYROID, ANTEPARTUM: ICD-10-CM

## 2021-07-19 DIAGNOSIS — O99.340: ICD-10-CM

## 2021-07-19 DIAGNOSIS — O09.93 SUPERVISION OF HIGH RISK PREGNANCY IN THIRD TRIMESTER: Primary | ICD-10-CM

## 2021-07-19 DIAGNOSIS — E07.9 DISORDER OF THYROID, ANTEPARTUM: ICD-10-CM

## 2021-07-19 DIAGNOSIS — F32.9: ICD-10-CM

## 2021-07-19 PROCEDURE — 99207 PR PRENATAL VISIT: CPT | Performed by: OBSTETRICS & GYNECOLOGY

## 2021-07-19 NOTE — PROGRESS NOTES
Subjective:      30 year old  at 31w0d presentst for a routine prenatal appointment.   -Denies vaginal bleeding or leakage of fluid.  Denies contractions. More tightness.   positve fetal movement.      -No HA, visual changes, RUQ or epigastric pain.   -Experiencing restless leg that makes it hard to sleep at night; has tried compression socks and magnesium foam, ice/heat, and elevating. Also increasing stretching pressure in the pelvis.   -continuing to take iron supplement. Some changes in bowel habits  -continues to take Zoloft. Increasing anxiety related to getting ready for baby. Taking one year off from work.  -history of hypothyroidism; TSH with free T4 on 2021 within normal range.     Objective:  Vitals:    21 0951   BP: 120/52   Weight: 72.6 kg (160 lb)       Assessment/Plan     Encounter Diagnoses   Name Primary?     Supervision of high risk pregnancy in third trimester Yes     Disorder of thyroid, antepartum      Major depressive disorder in mother affecting pregnancy        ABO   Date Value Ref Range Status   2021 A  Final     RH(D)   Date Value Ref Range Status   2021 Pos  Final     Antibody Screen   Date Value Ref Range Status   2021 Neg  Final     -restless leg: encouraged to drink more water, start magnesium supplements, elevate legs, wear support belt.   -Anemia: check hemoglobin at the next visit. May need IV iron infusions if below 11 g/dL.  -hypothyroidism: check TSH at 37 weeks   -Reviewed why/how to contact provider if headache/visual changes/RUQ or epigastric pain, decreased fetal movement, vaginal bleeding, leakage of fluid or more than 4 contractions in an hour.  -Reviewed GBS screening at 35-36 wks.  -Return to clinic in 2 weeks and prn if questions or concerns.     Nicole Caldwell      Physician Attestation   I, Ingris Armstrong Masters, saw and evaluated Dave Toro Meyer  as part of a shared visit.  I have reviewed and discussed with the advanced  practice provider student their history and plan.    I personally reviewed the past medical history, surgical history, vital signs, medications, and appropriate labs/imaging. I was personally involved with history, exam, A/P formulation, and patient counseling.    Ingris Armstrong Masters, DO

## 2021-07-30 ENCOUNTER — TELEPHONE (OUTPATIENT)
Dept: OBGYN | Facility: CLINIC | Age: 31
End: 2021-07-30

## 2021-07-30 NOTE — TELEPHONE ENCOUNTER
LMTCB  Adrianna Hernandez RN on 7/30/2021 at 11:08 AM    Concerns:  Day 4 of diarrhea  Intermittent cramping-will feel for every few hours-going on last three days  Occasional lightheadedness   Some increase in vaginal discharge white/slight yellow tinge    Denies:   Fever, urinary issues, vaginal irritation, leaking of fluid and/or vaginal bleeding    Discussed:  Fetal movement-baby is active  Stay hydrated-can add Gatorade or Pedialyte along with water.   Continue bland diet  Immodium safe in pregnancy  Reviewed s/s PTL-hx full term delivery    Will consult with Dr. Briones for further recommendations.  Adrianna Hernandez RN on 7/30/2021 at 11:23 AM

## 2021-07-30 NOTE — TELEPHONE ENCOUNTER
Left message-can call 24/7 with concerns or if symptoms do not improve  Adrianna Hernandez RN on 7/30/2021 at 1:39 PM

## 2021-07-30 NOTE — TELEPHONE ENCOUNTER
Pt called and is currently 32 wks and for the last 3 days she's had contractions as well non stop diarrhea and now more vaginal dishcarge.

## 2021-08-02 ENCOUNTER — MYC MEDICAL ADVICE (OUTPATIENT)
Dept: OBGYN | Facility: CLINIC | Age: 31
End: 2021-08-02

## 2021-08-02 NOTE — TELEPHONE ENCOUNTER
Type of Paperwork received:  fmla (spouse)    Date Rcvd:  8/2/2021    Rcvd From (Company name): [facility]    Provider:  Masters    Placed on Provider Cart Date:  8/2/2021

## 2021-08-08 ENCOUNTER — HOSPITAL ENCOUNTER (EMERGENCY)
Facility: CLINIC | Age: 31
Discharge: HOME | End: 2021-08-08
Attending: EMERGENCY MEDICINE | Admitting: EMERGENCY MEDICINE
Payer: COMMERCIAL

## 2021-08-08 ENCOUNTER — NURSE TRIAGE (OUTPATIENT)
Dept: NURSING | Facility: CLINIC | Age: 31
End: 2021-08-08

## 2021-08-08 VITALS
RESPIRATION RATE: 20 BRPM | HEART RATE: 87 BPM | DIASTOLIC BLOOD PRESSURE: 68 MMHG | TEMPERATURE: 99.3 F | OXYGEN SATURATION: 100 % | SYSTOLIC BLOOD PRESSURE: 99 MMHG

## 2021-08-08 DIAGNOSIS — R50.9 ACUTE FEBRILE ILLNESS: ICD-10-CM

## 2021-08-08 DIAGNOSIS — Z34.93 THIRD TRIMESTER PREGNANCY: ICD-10-CM

## 2021-08-08 DIAGNOSIS — O99.283 THYROID DISEASE IN PREGNANCY IN THIRD TRIMESTER: ICD-10-CM

## 2021-08-08 DIAGNOSIS — E07.9 THYROID DISEASE IN PREGNANCY IN THIRD TRIMESTER: ICD-10-CM

## 2021-08-08 LAB
ALBUMIN SERPL-MCNC: 2.5 G/DL (ref 3.4–5)
ALBUMIN UR-MCNC: NEGATIVE MG/DL
ALP SERPL-CCNC: 108 U/L (ref 40–150)
ALT SERPL W P-5'-P-CCNC: 27 U/L (ref 0–50)
ANION GAP SERPL CALCULATED.3IONS-SCNC: 10 MMOL/L (ref 3–14)
APPEARANCE UR: CLEAR
AST SERPL W P-5'-P-CCNC: 18 U/L (ref 0–45)
BASOPHILS # BLD AUTO: 0 10E3/UL (ref 0–0.2)
BASOPHILS NFR BLD AUTO: 0 %
BILIRUB SERPL-MCNC: 0.6 MG/DL (ref 0.2–1.3)
BILIRUB UR QL STRIP: NEGATIVE
BUN SERPL-MCNC: 5 MG/DL (ref 7–30)
CALCIUM SERPL-MCNC: 9 MG/DL (ref 8.5–10.1)
CHLORIDE BLD-SCNC: 105 MMOL/L (ref 94–109)
CO2 SERPL-SCNC: 21 MMOL/L (ref 20–32)
COLOR UR AUTO: ABNORMAL
CREAT SERPL-MCNC: 0.58 MG/DL (ref 0.52–1.04)
EOSINOPHIL # BLD AUTO: 0.1 10E3/UL (ref 0–0.7)
EOSINOPHIL NFR BLD AUTO: 1 %
ERYTHROCYTE [DISTWIDTH] IN BLOOD BY AUTOMATED COUNT: 16.8 % (ref 10–15)
GFR SERPL CREATININE-BSD FRML MDRD: >90 ML/MIN/1.73M2
GLUCOSE BLD-MCNC: 179 MG/DL (ref 70–99)
GLUCOSE UR STRIP-MCNC: 500 MG/DL
HCT VFR BLD AUTO: 30.6 % (ref 35–47)
HGB BLD-MCNC: 9.5 G/DL (ref 11.7–15.7)
HGB UR QL STRIP: NEGATIVE
IMM GRANULOCYTES # BLD: 0.1 10E3/UL
IMM GRANULOCYTES NFR BLD: 1 %
KETONES UR STRIP-MCNC: NEGATIVE MG/DL
LACTATE SERPL-SCNC: 1.6 MMOL/L (ref 0.7–2)
LACTATE SERPL-SCNC: 2.4 MMOL/L (ref 0.7–2)
LEUKOCYTE ESTERASE UR QL STRIP: NEGATIVE
LIPASE SERPL-CCNC: 89 U/L (ref 73–393)
LYMPHOCYTES # BLD AUTO: 1.1 10E3/UL (ref 0.8–5.3)
LYMPHOCYTES NFR BLD AUTO: 12 %
MCH RBC QN AUTO: 25.8 PG (ref 26.5–33)
MCHC RBC AUTO-ENTMCNC: 31 G/DL (ref 31.5–36.5)
MCV RBC AUTO: 83 FL (ref 78–100)
MONOCYTES # BLD AUTO: 1.1 10E3/UL (ref 0–1.3)
MONOCYTES NFR BLD AUTO: 11 %
NEUTROPHILS # BLD AUTO: 7.3 10E3/UL (ref 1.6–8.3)
NEUTROPHILS NFR BLD AUTO: 75 %
NITRATE UR QL: NEGATIVE
NRBC # BLD AUTO: 0 10E3/UL
NRBC BLD AUTO-RTO: 0 /100
PH UR STRIP: 6.5 [PH] (ref 5–7)
PLATELET # BLD AUTO: 177 10E3/UL (ref 150–450)
POTASSIUM BLD-SCNC: 3.7 MMOL/L (ref 3.4–5.3)
PROCALCITONIN SERPL-MCNC: <0.05 NG/ML
PROT SERPL-MCNC: 6.5 G/DL (ref 6.8–8.8)
RBC # BLD AUTO: 3.68 10E6/UL (ref 3.8–5.2)
RBC URINE: <1 /HPF
SARS-COV-2 RNA RESP QL NAA+PROBE: NEGATIVE
SODIUM SERPL-SCNC: 136 MMOL/L (ref 133–144)
SP GR UR STRIP: 1 (ref 1–1.03)
SQUAMOUS EPITHELIAL: 10 /HPF
UROBILINOGEN UR STRIP-MCNC: NORMAL MG/DL
WBC # BLD AUTO: 9.7 10E3/UL (ref 4–11)
WBC URINE: 1 /HPF

## 2021-08-08 PROCEDURE — 84439 ASSAY OF FREE THYROXINE: CPT

## 2021-08-08 PROCEDURE — 96361 HYDRATE IV INFUSION ADD-ON: CPT

## 2021-08-08 PROCEDURE — 84443 ASSAY THYROID STIM HORMONE: CPT

## 2021-08-08 PROCEDURE — 59025 FETAL NON-STRESS TEST: CPT

## 2021-08-08 PROCEDURE — 85025 COMPLETE CBC W/AUTO DIFF WBC: CPT | Performed by: EMERGENCY MEDICINE

## 2021-08-08 PROCEDURE — 87040 BLOOD CULTURE FOR BACTERIA: CPT | Performed by: EMERGENCY MEDICINE

## 2021-08-08 PROCEDURE — 87635 SARS-COV-2 COVID-19 AMP PRB: CPT | Performed by: EMERGENCY MEDICINE

## 2021-08-08 PROCEDURE — 83690 ASSAY OF LIPASE: CPT | Performed by: EMERGENCY MEDICINE

## 2021-08-08 PROCEDURE — C9803 HOPD COVID-19 SPEC COLLECT: HCPCS

## 2021-08-08 PROCEDURE — 258N000003 HC RX IP 258 OP 636: Performed by: EMERGENCY MEDICINE

## 2021-08-08 PROCEDURE — 99284 EMERGENCY DEPT VISIT MOD MDM: CPT | Mod: 25

## 2021-08-08 PROCEDURE — 84145 PROCALCITONIN (PCT): CPT | Performed by: EMERGENCY MEDICINE

## 2021-08-08 PROCEDURE — 83605 ASSAY OF LACTIC ACID: CPT | Performed by: EMERGENCY MEDICINE

## 2021-08-08 PROCEDURE — 36415 COLL VENOUS BLD VENIPUNCTURE: CPT | Performed by: EMERGENCY MEDICINE

## 2021-08-08 PROCEDURE — 96360 HYDRATION IV INFUSION INIT: CPT

## 2021-08-08 PROCEDURE — 81001 URINALYSIS AUTO W/SCOPE: CPT | Performed by: EMERGENCY MEDICINE

## 2021-08-08 PROCEDURE — 82040 ASSAY OF SERUM ALBUMIN: CPT | Performed by: EMERGENCY MEDICINE

## 2021-08-08 RX ADMIN — SODIUM CHLORIDE 1000 ML: 9 INJECTION, SOLUTION INTRAVENOUS at 19:27

## 2021-08-08 RX ADMIN — SODIUM CHLORIDE 1000 ML: 9 INJECTION, SOLUTION INTRAVENOUS at 18:44

## 2021-08-08 NOTE — ED TRIAGE NOTES
Started low grade on Wednesday. Today 101.6 Took 2 tylenol 1 hour prior to arrival. c/o congestion, feeling achey. Pts son had same symptoms earlier this week and had 2 negative covids. Pt 34 weeks pregnant.

## 2021-08-08 NOTE — ED PROVIDER NOTES
History   Chief Complaint:  Fever     HPI  History supplemented by electronic chart review    Dave Meyer is a 30 year old, 34 weeks pregnant female who was brought here by her  for evaluation of a fever and other symptoms.  About 1 week ago, her young son was sick with a fever, cough, and congestion, and 4 days ago she developed nasal congestion and a slight dry cough, followed later by a fever up to 101.6.  She took Tylenol, 1000 mg, about 1 hour prior to coming here today.  She has slight lower abdominal discomfort but no urinary discomfort.  No new leg swelling.  No rash.  No vomiting.  She feels minimally short of breath.  No history of DVT or PE.  No chest pain.  Her stools have been slightly loose.  She was fully vaccinated against Covid in March.  She wonders whether she might have Covid now.  Her primary OB doctor is Dr. Briones.  She has had routine prenatal care and has not had any problems with this pregnancy to date.      Chart review shows that her hemoglobin was 9.3 in late June of this year.    Review of Systems   All other systems reviewed and are negative.    Allergies:  Bee Venom    Medications:  Buspar  Synthroid  Zoloft    Past Medical History:    Anovular menstruation  Depression  Hypothyroidism  PCOS  Recurrent pregnancy loss  Hypothyroidism    Family History:    Mother: hypothyroidism, diabetes  Sister: hypothyroidism, depression  Brother: depression  Father: MI, hyperlipidemia    Social History:  Lives with .  Has a young child at home.    Physical Exam     Patient Vitals for the past 24 hrs:   BP Temp Temp src Pulse Resp SpO2   08/08/21 2040 99/68 -- -- 87 -- 100 %   08/08/21 1830 120/66 -- -- 99 -- 97 %   08/08/21 1825 -- -- -- -- -- 98 %   08/08/21 1820 104/67 -- -- 112 -- 97 %   08/08/21 1759 119/77 99.3  F (37.4  C) Oral 118 20 100 %       Physical Exam  Gen: Nontoxic-appearing woman sitting upright in room 8  HENT: mucous membranes moist, mastoids  nontender, OP clear without swelling or exudate  Eyes: pupils normal, no scleral injection  CV: regular rhythm, cap refill normal  Resp: normal effort, speaks in full phrases, no stridor, no cough observed, clear throughout  GI: abdomen soft and nontender, no guarding, obviously gravid abdomen  MSK: no bony tenderness, no CVA tenderness  Skin: appropriately warm and dry, no ecchymosis, no petechiae  Neuro: awake, alert, normal tone in extremities, no meningismus  Psych: cooperative, pleasant    Emergency Department Course     Laboratory:    CMP: Urea Nitrogen: 5(L), Glucose: 179 (H), Protein Total: 6.5 (L), Albumin: 2.5 (L) o/w WNL (Creatinine 0.58)     Lipase: 89    Lactic acid (result time 1833) 2.4 (H)    Lactic acid (result time 2026) 1.6    Procalcitonin: <0.05    CBC: WBC 9.7, HGB 9.5 (L),      UA with microscopic: Glucose: 500 (A), Squamous epithelial: 10 (H) o/w WNL    Symptomatic COVID19 Virus PCR by nasopharyngeal swab: negative.     Blood culture x2: pending    Emergency Department Course:    Reviewed:  I reviewed nursing notes, vitals, past medical history and care everywhere.    Assessments:  1831 I obtained history and examined the patient as noted above.   1825 I rechecked the patient and explained findings.   2003 I rechecked the patient and explained findings.     Interventions:  1844 0.9% sodium chloride bolus 1000 mL IV  1927 0.9% sodium chloride bolus 1000 mL IV    Disposition:  The patient was discharged to home.     Impression & Plan   Medical Decision Making:  The fact that her son very recently had similar symptoms strongly suggests an acute contagious process, likely viral infection. Both the son and now the patient have had negative Covid test, making this disease quite unlikely in this vaccinated patient. She has normal work of breathing. She is not hypoxic. Blood cultures were sent though my suspicion for bloodstream infection is low. Her hemoglobin is low though actually stable  from last check, and her presentation does not suggest acute blood loss. Normal white blood cell count and procalcitonin levels argue against serious bacterial infection. Urinalysis does not show infection. She was monitored by the OB team who does not find signs of active labor or other immediate obstetric complication or emergency. She was given IV fluids. Repeat lactate improved. The diagnostic uncertainty, presumed diagnosis, and test results today were reviewed with this pleasant patient in detail. She is very comfortable with the plan for discharge home and close outpatient follow-up. I specifically asked her to return here for sudden worsening at any hour. All questions answered prior to discharge.    Covid-19  Dave Meyer was evaluated during a global COVID-19 pandemic, which necessitated consideration that the patient might be at risk for infection with the SARS-CoV-2 virus that causes COVID-19.   Applicable protocols for evaluation were followed during the patient's care.   COVID-19 was considered as part of the patient's evaluation. The plan for testing is:  a test was obtained during this visit.    Diagnosis:    ICD-10-CM    1. Acute febrile illness  R50.9    2. Third trimester pregnancy  Z34.93        Scribe Disclosure:  I, Daksha Tijerina, am serving as a scribe at 6:03 PM on 8/8/2021 to document services personally performed by Jhonatan Nielsen MD based on my observations and the provider's statements to me.     I, Ciaran Wilcox, am serving as a scribe  at 8:52 PM on 8/8/2021 to document services personally performed by Jhonatan Nielsen MD based on my observations and the provider's statements to me.     This note was completed in part using Dragon voice recognition software. Although reviewed after completion, some word and grammatical errors may occur.         Jhonatan Nielsen MD  08/09/21 0209     Spontaneous, unlabored and symmetrical

## 2021-08-08 NOTE — TELEPHONE ENCOUNTER
Patient adán - is currently 33 weeks pregnant and she is sick.  Says she has nasal congestion that started Wednesday, cough for last two days and today she developed fever, body aches, lightheadedness, shortness of breath and racing heart.  Last temperature was 101.6 taken on forehead.  Says she iIs fully vaccinated against COVID19.    Triaged to disposition of go to ED Now.    Ludivina Bryant RN  Triage Nurse Advisor    COVID 19 Nurse Triage Plan/Patient Instructions    Please be aware that novel coronavirus (COVID-19) may be circulating in the community. If you develop symptoms such as fever, cough, or SOB or if you have concerns about the presence of another infection including coronavirus (COVID-19), please contact your health care provider or visit [email or web address].     Disposition/Instructions    ED Visit recommended. Follow protocol based instructions.     Bring Your Own Device:  Please also bring your smart device(s) (smart phones, tablets, laptops) and their charging cables for your personal use and to communicate with your care team during your visit.    Thank you for taking steps to prevent the spread of this virus.  o Limit your contact with others.  o Wear a simple mask to cover your cough.  o Wash your hands well and often.    Resources    [facility]: About COVID-19: [email or web address]    CDC: What to Do If You're Sick: www.cdc.gov/coronavirus/2019-ncov/about/steps-when-sick.html    CDC: Ending Home Isolation: www.cdc.gov/coronavirus/2019-ncov/hcp/disposition-in-home-patients.html     CDC: Caring for Someone: www.cdc.gov/coronavirus/2019-ncov/if-you-are-sick/care-for-someone.html     [facility]: Interim Guidance for Hospital Discharge to Home: www.health.[facility].[place]/diseases/coronavirus/hcp/hospdischarge.pdf    [facility] clinical trials (COVID-19 research studies): [email or web address].[email or web address]/umn-[email or web address]-trials     Below are the COVID-19 hotlines at the [place]  Department of Health ([facility]). Interpreters are available.   o For health questions: Call 202-221-8737 or 1-470.363.5488 (7 a.m. to 7 p.m.)  o For questions about schools and childcare: Call 270-410-0482 or 1-686.705.9036 (7 a.m. to 7 p.m.)       Reason for Disposition    MODERATE difficulty breathing (e.g., speaks in phrases, SOB even at rest, pulse 100-120)    Additional Information    Negative: SEVERE difficulty breathing (e.g., struggling for each breath, speaks in single words)    Negative: Difficult to awaken or acting confused (e.g., disoriented, slurred speech)    Negative: Bluish (or gray) lips or face now    Negative: Shock suspected (e.g., cold/pale/clammy skin, too weak to stand, low BP, rapid pulse)    Negative: Sounds like a life-threatening emergency to the triager    Negative: [1] COVID-19 exposure AND [2] has not completed COVID-19 vaccine series AND [3] no symptoms    Negative: [1] COVID-19 exposure AND [2] completed COVID-19 vaccine series (fully vaccinated) AND [3] no symptoms    Negative: COVID-19 vaccine reaction suspected (e.g., fever, headache, muscle aches) occurring during days 1-3 after getting vaccine    Negative: COVID-19 vaccine, questions about    Negative: [1] COVID-19 vaccine series completed (fully vaccinated) in past 3 months AND [2] new-onset of COVID-19 symptoms BUT [3] no known exposure    Negative: [1] Had lab test confirmed COVID-19 infection within last 3 monthsAND [2] new-onset of COVID-19 symptoms BUT [3] no known exposure    Negative: [1] Lives with someone known to have influenza (flu test positive) AND [2] flu-like symptoms (e.g., cough, runny nose, sore throat, SOB; with or without fever)    Negative: [1] Adult with possible COVID-19 symptoms AND [2] triager concerned about severity of symptoms or other causes    Negative: COVID-19 and breastfeeding, questions about    Protocols used: CORONAVIRUS (COVID-19) DIAGNOSED OR [id number] 3.25

## 2021-08-09 ENCOUNTER — VIRTUAL VISIT (OUTPATIENT)
Dept: OBGYN | Facility: CLINIC | Age: 31
End: 2021-08-09
Payer: COMMERCIAL

## 2021-08-09 DIAGNOSIS — O99.283 THYROID DISEASE IN PREGNANCY IN THIRD TRIMESTER: Primary | ICD-10-CM

## 2021-08-09 DIAGNOSIS — O99.019 IRON DEFICIENCY ANEMIA DURING PREGNANCY: ICD-10-CM

## 2021-08-09 DIAGNOSIS — O09.93 SUPERVISION OF HIGH RISK PREGNANCY IN THIRD TRIMESTER: ICD-10-CM

## 2021-08-09 DIAGNOSIS — E07.9 THYROID DISEASE IN PREGNANCY IN THIRD TRIMESTER: Primary | ICD-10-CM

## 2021-08-09 DIAGNOSIS — D50.9 IRON DEFICIENCY ANEMIA DURING PREGNANCY: ICD-10-CM

## 2021-08-09 LAB
T4 FREE SERPL-MCNC: 1.11 NG/DL (ref 0.76–1.46)
TSH SERPL DL<=0.005 MIU/L-ACNC: 0.07 MU/L (ref 0.4–4)

## 2021-08-09 PROCEDURE — 99207 PR PRENATAL VISIT: CPT | Performed by: OBSTETRICS & GYNECOLOGY

## 2021-08-09 RX ORDER — HEPARIN SODIUM (PORCINE) LOCK FLUSH IV SOLN 100 UNIT/ML 100 UNIT/ML
5 SOLUTION INTRAVENOUS
Status: CANCELLED | OUTPATIENT
Start: 2021-08-12

## 2021-08-09 RX ORDER — ACETAMINOPHEN 500 MG
500-1000 TABLET ORAL EVERY 6 HOURS PRN
COMMUNITY
End: 2021-08-16

## 2021-08-09 RX ORDER — NALOXONE HYDROCHLORIDE 0.4 MG/ML
0.2 INJECTION, SOLUTION INTRAMUSCULAR; INTRAVENOUS; SUBCUTANEOUS
Status: CANCELLED | OUTPATIENT
Start: 2021-08-12

## 2021-08-09 RX ORDER — DIPHENHYDRAMINE HYDROCHLORIDE 50 MG/ML
50 INJECTION INTRAMUSCULAR; INTRAVENOUS
Status: CANCELLED
Start: 2021-08-12

## 2021-08-09 RX ORDER — EPINEPHRINE 1 MG/ML
0.3 INJECTION, SOLUTION, CONCENTRATE INTRAVENOUS EVERY 5 MIN PRN
Status: CANCELLED | OUTPATIENT
Start: 2021-08-12

## 2021-08-09 RX ORDER — MEPERIDINE HYDROCHLORIDE 25 MG/ML
25 INJECTION INTRAMUSCULAR; INTRAVENOUS; SUBCUTANEOUS EVERY 30 MIN PRN
Status: CANCELLED | OUTPATIENT
Start: 2021-08-12

## 2021-08-09 RX ORDER — METHYLPREDNISOLONE SODIUM SUCCINATE 125 MG/2ML
125 INJECTION, POWDER, LYOPHILIZED, FOR SOLUTION INTRAMUSCULAR; INTRAVENOUS
Status: CANCELLED
Start: 2021-08-12

## 2021-08-09 RX ORDER — ALBUTEROL SULFATE 0.83 MG/ML
2.5 SOLUTION RESPIRATORY (INHALATION)
Status: CANCELLED | OUTPATIENT
Start: 2021-08-12

## 2021-08-09 RX ORDER — HEPARIN SODIUM,PORCINE 10 UNIT/ML
5 VIAL (ML) INTRAVENOUS
Status: CANCELLED | OUTPATIENT
Start: 2021-08-12

## 2021-08-09 RX ORDER — ALBUTEROL SULFATE 90 UG/1
1-2 AEROSOL, METERED RESPIRATORY (INHALATION)
Status: CANCELLED
Start: 2021-08-12

## 2021-08-09 NOTE — DISCHARGE INSTRUCTIONS
I recommend that you continue to take Tylenol, up to 1000 mg per dose, but not exceeding 4000 mg within 24 hours.  It seems most likely that you have a viral illness, though your Covid test is negative.  I do not think you need antibiotics at this time.  If you develop severe trouble breathing, unbearable pain, fevers that do not improve temporarily with Tylenol, severe abdominal pain, or other concerns, please return promptly to the emergency department at any hour.

## 2021-08-09 NOTE — ED NOTES
Pt ready for discharge. Pt is alert and oriented, able to ambulate without difficulty. No other complaints at this time. Reviewed discharge instructions with patient and all questions answered. Pt agreeable with plan.

## 2021-08-09 NOTE — Clinical Note
I placed orders for pt to have IV iron, but will need to start when she is feeling a bit better. She is aware.

## 2021-08-09 NOTE — PROGRESS NOTES
Dave Meyer is a 30 year old female who is being evaluated via a billable telephone visit.      What phone number would you like to be contacted at? [phone]  How would you like to obtain your AVS? Harley    SUBJECTIVE:          In the ER last night for fever, had labs.                              Fever is down, just feeling achy and congested and body aches.   Son had this for 3 d last weekend.     Had been taking oral iron    Didn't have a great epidural last time, didn't work like she expected. Wants to discuss pain control.   Was told about some of the covid protocols at hospital by nursing yesterday in ER.    +FM. No lov/vb/ctx.                Phone call duration: 10:30 minutes    -Iron def anemia in pregnancy. IV iron planned, orders placed. Pt agrees to plan. Will wait until end of week or beginning of next week pending how she feels  -Had sent in her  FMLA forms.  -Discussed labor and delivery plans.   - labor precatuions. F/U 2wk    Ingris Armstrong Masters, DO  [facility] [place]

## 2021-08-09 NOTE — PLAN OF CARE
Pt is a  who is 33.6 weeks in the ED for fever, cough and shortness of breath.  Asked per ED physician to come to the ED to monitor baby.  Pt states she is feeling the baby move, denies any leaking of fluid, contractions or vaginal bleeding.  Monitors applied after obtaining verbal consent.  Category 1 NST.  Dr Cedeño paged with NST results and no new orders received.  Pt to follow up with OB provider as scheduled in the clinic or call is she has any concerns.

## 2021-08-10 ENCOUNTER — INFUSION THERAPY VISIT (OUTPATIENT)
Dept: INFUSION THERAPY | Facility: CLINIC | Age: 31
End: 2021-08-10
Attending: OBSTETRICS & GYNECOLOGY
Payer: COMMERCIAL

## 2021-08-10 VITALS
SYSTOLIC BLOOD PRESSURE: 98 MMHG | TEMPERATURE: 98.2 F | DIASTOLIC BLOOD PRESSURE: 64 MMHG | HEART RATE: 80 BPM | RESPIRATION RATE: 18 BRPM

## 2021-08-10 DIAGNOSIS — O99.019 IRON DEFICIENCY ANEMIA DURING PREGNANCY: ICD-10-CM

## 2021-08-10 DIAGNOSIS — O09.93 SUPERVISION OF HIGH RISK PREGNANCY IN THIRD TRIMESTER: Primary | ICD-10-CM

## 2021-08-10 DIAGNOSIS — D50.9 IRON DEFICIENCY ANEMIA DURING PREGNANCY: ICD-10-CM

## 2021-08-10 PROCEDURE — 258N000003 HC RX IP 258 OP 636: Performed by: OBSTETRICS & GYNECOLOGY

## 2021-08-10 PROCEDURE — 250N000011 HC RX IP 250 OP 636: Performed by: OBSTETRICS & GYNECOLOGY

## 2021-08-10 PROCEDURE — 96365 THER/PROPH/DIAG IV INF INIT: CPT

## 2021-08-10 RX ORDER — MEPERIDINE HYDROCHLORIDE 25 MG/ML
25 INJECTION INTRAMUSCULAR; INTRAVENOUS; SUBCUTANEOUS EVERY 30 MIN PRN
Status: CANCELLED | OUTPATIENT
Start: 2021-08-12

## 2021-08-10 RX ORDER — HEPARIN SODIUM,PORCINE 10 UNIT/ML
5 VIAL (ML) INTRAVENOUS
Status: CANCELLED | OUTPATIENT
Start: 2021-08-12

## 2021-08-10 RX ORDER — NALOXONE HYDROCHLORIDE 0.4 MG/ML
0.2 INJECTION, SOLUTION INTRAMUSCULAR; INTRAVENOUS; SUBCUTANEOUS
Status: CANCELLED | OUTPATIENT
Start: 2021-08-12

## 2021-08-10 RX ORDER — ALBUTEROL SULFATE 0.83 MG/ML
2.5 SOLUTION RESPIRATORY (INHALATION)
Status: CANCELLED | OUTPATIENT
Start: 2021-08-12

## 2021-08-10 RX ORDER — EPINEPHRINE 1 MG/ML
0.3 INJECTION, SOLUTION INTRAMUSCULAR; SUBCUTANEOUS EVERY 5 MIN PRN
Status: CANCELLED | OUTPATIENT
Start: 2021-08-12

## 2021-08-10 RX ORDER — ALBUTEROL SULFATE 90 UG/1
1-2 AEROSOL, METERED RESPIRATORY (INHALATION)
Status: CANCELLED
Start: 2021-08-12

## 2021-08-10 RX ORDER — METHYLPREDNISOLONE SODIUM SUCCINATE 125 MG/2ML
125 INJECTION, POWDER, LYOPHILIZED, FOR SOLUTION INTRAMUSCULAR; INTRAVENOUS
Status: CANCELLED
Start: 2021-08-12

## 2021-08-10 RX ORDER — HEPARIN SODIUM (PORCINE) LOCK FLUSH IV SOLN 100 UNIT/ML 100 UNIT/ML
5 SOLUTION INTRAVENOUS
Status: CANCELLED | OUTPATIENT
Start: 2021-08-12

## 2021-08-10 RX ORDER — DIPHENHYDRAMINE HYDROCHLORIDE 50 MG/ML
50 INJECTION INTRAMUSCULAR; INTRAVENOUS
Status: CANCELLED
Start: 2021-08-12

## 2021-08-10 RX ADMIN — IRON SUCROSE 200 MG: 20 INJECTION, SOLUTION INTRAVENOUS at 11:59

## 2021-08-10 RX ADMIN — SODIUM CHLORIDE 250 ML: 9 INJECTION, SOLUTION INTRAVENOUS at 11:51

## 2021-08-10 ASSESSMENT — PAIN SCALES - GENERAL: PAINLEVEL: NO PAIN (0)

## 2021-08-10 NOTE — PROGRESS NOTES
Infusion Nursing Note:  Dave Meyer presents today for venofer 200.    Patient seen by provider today: No   present during visit today: Not Applicable.    Note: N/A.      Intravenous Access:  Peripheral IV placed. Right after iv insertion, pt became diaphoretic and lightheaded, bp low, laid patient flat and started IVF, pt recovered after 5-10 minutes, bp normalized and pt felt back to baseline    Treatment Conditions:  Not Applicable.      Post Infusion Assessment:  Patient tolerated infusion without incident.  Blood return noted pre and post infusion.  Site patent and intact, free from redness, edema or discomfort.  No evidence of extravasations.  Access discontinued per protocol.       Discharge Plan:   Discharge instructions reviewed with: Patient.  Patient and/or family verbalized understanding of discharge instructions and all questions answered.  Patient discharged in stable condition accompanied by: self.  Departure Mode: Ambulatory.      Kasia Gutierrez RN                       Dorsal Nasal Flap Text: The defect edges were debeveled with a #15 scalpel blade.  Given the location of the defect and the proximity to free margins a dorsal nasal flap was deemed most appropriate.  Using a sterile surgical marker, an appropriate dorsal nasal flap was drawn around the defect.    The area thus outlined was incised deep to adipose tissue with a #15 scalpel blade.  The skin margins were undermined to an appropriate distance in all directions utilizing iris scissors.

## 2021-08-13 ENCOUNTER — INFUSION THERAPY VISIT (OUTPATIENT)
Dept: INFUSION THERAPY | Facility: CLINIC | Age: 31
End: 2021-08-13
Attending: OBSTETRICS & GYNECOLOGY
Payer: COMMERCIAL

## 2021-08-13 VITALS
RESPIRATION RATE: 18 BRPM | HEART RATE: 92 BPM | SYSTOLIC BLOOD PRESSURE: 106 MMHG | OXYGEN SATURATION: 99 % | TEMPERATURE: 99.3 F | DIASTOLIC BLOOD PRESSURE: 71 MMHG

## 2021-08-13 DIAGNOSIS — D50.9 IRON DEFICIENCY ANEMIA DURING PREGNANCY: ICD-10-CM

## 2021-08-13 DIAGNOSIS — O99.019 IRON DEFICIENCY ANEMIA DURING PREGNANCY: ICD-10-CM

## 2021-08-13 DIAGNOSIS — O09.93 SUPERVISION OF HIGH RISK PREGNANCY IN THIRD TRIMESTER: Primary | ICD-10-CM

## 2021-08-13 LAB
BACTERIA BLD CULT: NO GROWTH
BACTERIA BLD CULT: NO GROWTH

## 2021-08-13 PROCEDURE — 250N000011 HC RX IP 250 OP 636: Performed by: OBSTETRICS & GYNECOLOGY

## 2021-08-13 PROCEDURE — 258N000003 HC RX IP 258 OP 636: Performed by: OBSTETRICS & GYNECOLOGY

## 2021-08-13 PROCEDURE — 96365 THER/PROPH/DIAG IV INF INIT: CPT

## 2021-08-13 RX ORDER — EPINEPHRINE 1 MG/ML
0.3 INJECTION, SOLUTION INTRAMUSCULAR; SUBCUTANEOUS EVERY 5 MIN PRN
Status: CANCELLED | OUTPATIENT
Start: 2021-08-14

## 2021-08-13 RX ORDER — HEPARIN SODIUM,PORCINE 10 UNIT/ML
5 VIAL (ML) INTRAVENOUS
Status: CANCELLED | OUTPATIENT
Start: 2021-08-14

## 2021-08-13 RX ORDER — ALBUTEROL SULFATE 90 UG/1
1-2 AEROSOL, METERED RESPIRATORY (INHALATION)
Status: CANCELLED
Start: 2021-08-14

## 2021-08-13 RX ORDER — HEPARIN SODIUM (PORCINE) LOCK FLUSH IV SOLN 100 UNIT/ML 100 UNIT/ML
5 SOLUTION INTRAVENOUS
Status: CANCELLED | OUTPATIENT
Start: 2021-08-14

## 2021-08-13 RX ORDER — NALOXONE HYDROCHLORIDE 0.4 MG/ML
0.2 INJECTION, SOLUTION INTRAMUSCULAR; INTRAVENOUS; SUBCUTANEOUS
Status: CANCELLED | OUTPATIENT
Start: 2021-08-14

## 2021-08-13 RX ORDER — MEPERIDINE HYDROCHLORIDE 25 MG/ML
25 INJECTION INTRAMUSCULAR; INTRAVENOUS; SUBCUTANEOUS EVERY 30 MIN PRN
Status: CANCELLED | OUTPATIENT
Start: 2021-08-14

## 2021-08-13 RX ORDER — METHYLPREDNISOLONE SODIUM SUCCINATE 125 MG/2ML
125 INJECTION, POWDER, LYOPHILIZED, FOR SOLUTION INTRAMUSCULAR; INTRAVENOUS
Status: CANCELLED
Start: 2021-08-14

## 2021-08-13 RX ORDER — DIPHENHYDRAMINE HYDROCHLORIDE 50 MG/ML
50 INJECTION INTRAMUSCULAR; INTRAVENOUS
Status: CANCELLED
Start: 2021-08-14

## 2021-08-13 RX ORDER — ALBUTEROL SULFATE 0.83 MG/ML
2.5 SOLUTION RESPIRATORY (INHALATION)
Status: CANCELLED | OUTPATIENT
Start: 2021-08-14

## 2021-08-13 RX ADMIN — IRON SUCROSE 200 MG: 20 INJECTION, SOLUTION INTRAVENOUS at 15:22

## 2021-08-13 RX ADMIN — SODIUM CHLORIDE 250 ML: 9 INJECTION, SOLUTION INTRAVENOUS at 15:21

## 2021-08-13 NOTE — PROGRESS NOTES
Infusion Nursing Note:  aDve WEBER Meyer presents today for Venofer.    Patient seen by provider today: No   present during visit today: Not Applicable.    Note: Patient reported having a short period of lightheadedness with her first IV start on Tuesday.  No issues today with IV start or Venofer infusion.      Intravenous Access:  Peripheral IV placed.    Treatment Conditions:  Not Applicable.      Post Infusion Assessment:  Patient tolerated infusion without incident.  Patient observed for 30 minutes post Venofer per protocol.  Blood return noted pre and post infusion.  Site patent and intact, free from redness, edema or discomfort.  No evidence of extravasations.  Access discontinued per protocol.       Discharge Plan:   Patient declined prescription refills.  Discharge instructions reviewed with: Patient.  Patient and/or family verbalized understanding of discharge instructions and all questions answered.  AVS to patient via [employer].  Patient will return 8/16/21 for next appointment.   Patient discharged in stable condition accompanied by: self.  Departure Mode: Ambulatory.      Charlotte Rosen RN

## 2021-08-16 ENCOUNTER — INFUSION THERAPY VISIT (OUTPATIENT)
Dept: INFUSION THERAPY | Facility: CLINIC | Age: 31
End: 2021-08-16
Attending: OBSTETRICS & GYNECOLOGY
Payer: COMMERCIAL

## 2021-08-16 VITALS
TEMPERATURE: 98.8 F | OXYGEN SATURATION: 98 % | SYSTOLIC BLOOD PRESSURE: 101 MMHG | HEART RATE: 83 BPM | RESPIRATION RATE: 16 BRPM | DIASTOLIC BLOOD PRESSURE: 58 MMHG

## 2021-08-16 DIAGNOSIS — O99.019 IRON DEFICIENCY ANEMIA DURING PREGNANCY: ICD-10-CM

## 2021-08-16 DIAGNOSIS — D50.9 IRON DEFICIENCY ANEMIA DURING PREGNANCY: ICD-10-CM

## 2021-08-16 DIAGNOSIS — O09.93 SUPERVISION OF HIGH RISK PREGNANCY IN THIRD TRIMESTER: Primary | ICD-10-CM

## 2021-08-16 PROCEDURE — 250N000011 HC RX IP 250 OP 636: Performed by: OBSTETRICS & GYNECOLOGY

## 2021-08-16 PROCEDURE — 96365 THER/PROPH/DIAG IV INF INIT: CPT

## 2021-08-16 PROCEDURE — 258N000003 HC RX IP 258 OP 636: Performed by: OBSTETRICS & GYNECOLOGY

## 2021-08-16 RX ORDER — ALBUTEROL SULFATE 90 UG/1
1-2 AEROSOL, METERED RESPIRATORY (INHALATION)
Status: CANCELLED
Start: 2021-08-17

## 2021-08-16 RX ORDER — HEPARIN SODIUM (PORCINE) LOCK FLUSH IV SOLN 100 UNIT/ML 100 UNIT/ML
5 SOLUTION INTRAVENOUS
Status: CANCELLED | OUTPATIENT
Start: 2021-08-17

## 2021-08-16 RX ORDER — DIPHENHYDRAMINE HYDROCHLORIDE 50 MG/ML
50 INJECTION INTRAMUSCULAR; INTRAVENOUS
Status: CANCELLED
Start: 2021-08-17

## 2021-08-16 RX ORDER — NALOXONE HYDROCHLORIDE 0.4 MG/ML
0.2 INJECTION, SOLUTION INTRAMUSCULAR; INTRAVENOUS; SUBCUTANEOUS
Status: CANCELLED | OUTPATIENT
Start: 2021-08-17

## 2021-08-16 RX ORDER — METHYLPREDNISOLONE SODIUM SUCCINATE 125 MG/2ML
125 INJECTION, POWDER, LYOPHILIZED, FOR SOLUTION INTRAMUSCULAR; INTRAVENOUS
Status: CANCELLED
Start: 2021-08-17

## 2021-08-16 RX ORDER — HEPARIN SODIUM,PORCINE 10 UNIT/ML
5 VIAL (ML) INTRAVENOUS
Status: CANCELLED | OUTPATIENT
Start: 2021-08-17

## 2021-08-16 RX ORDER — ALBUTEROL SULFATE 0.83 MG/ML
2.5 SOLUTION RESPIRATORY (INHALATION)
Status: CANCELLED | OUTPATIENT
Start: 2021-08-17

## 2021-08-16 RX ORDER — EPINEPHRINE 1 MG/ML
0.3 INJECTION, SOLUTION INTRAMUSCULAR; SUBCUTANEOUS EVERY 5 MIN PRN
Status: CANCELLED | OUTPATIENT
Start: 2021-08-17

## 2021-08-16 RX ORDER — MEPERIDINE HYDROCHLORIDE 25 MG/ML
25 INJECTION INTRAMUSCULAR; INTRAVENOUS; SUBCUTANEOUS EVERY 30 MIN PRN
Status: CANCELLED | OUTPATIENT
Start: 2021-08-17

## 2021-08-16 RX ADMIN — SODIUM CHLORIDE 250 ML: 9 INJECTION, SOLUTION INTRAVENOUS at 14:21

## 2021-08-16 RX ADMIN — IRON SUCROSE 200 MG: 20 INJECTION, SOLUTION INTRAVENOUS at 14:21

## 2021-08-16 ASSESSMENT — PAIN SCALES - GENERAL: PAINLEVEL: NO PAIN (0)

## 2021-08-16 NOTE — PROGRESS NOTES
Infusion Nursing Note:  Dave WEBER Mitch presents today for venofer 3/5.    Patient seen by provider today: No   present during visit today: Not Applicable.    Note: N/A.      Intravenous Access:  Peripheral IV placed.    Treatment Conditions:  Not Applicable.      Post Infusion Assessment:  Patient tolerated infusion without incident.  Site patent and intact, free from redness, edema or discomfort.  No evidence of extravasations.  Access discontinued per protocol.       Discharge Plan:   AVS to patient via MYCHART.  Patient will return as prev gato for next appointment.   Patient discharged in stable condition accompanied by: self.  Departure Mode: Ambulatory.      Nando Jones RN

## 2021-08-19 ENCOUNTER — INFUSION THERAPY VISIT (OUTPATIENT)
Dept: INFUSION THERAPY | Facility: CLINIC | Age: 31
End: 2021-08-19
Attending: OBSTETRICS & GYNECOLOGY
Payer: COMMERCIAL

## 2021-08-19 ENCOUNTER — HOSPITAL ENCOUNTER (OUTPATIENT)
Facility: CLINIC | Age: 31
Discharge: HOME | End: 2021-08-19
Attending: OBSTETRICS & GYNECOLOGY | Admitting: OBSTETRICS & GYNECOLOGY
Payer: COMMERCIAL

## 2021-08-19 VITALS
TEMPERATURE: 98.6 F | SYSTOLIC BLOOD PRESSURE: 97 MMHG | HEART RATE: 74 BPM | RESPIRATION RATE: 16 BRPM | DIASTOLIC BLOOD PRESSURE: 60 MMHG

## 2021-08-19 DIAGNOSIS — O09.93 SUPERVISION OF HIGH RISK PREGNANCY IN THIRD TRIMESTER: Primary | ICD-10-CM

## 2021-08-19 DIAGNOSIS — O99.283 THYROID DISEASE IN PREGNANCY IN THIRD TRIMESTER: ICD-10-CM

## 2021-08-19 DIAGNOSIS — D50.9 IRON DEFICIENCY ANEMIA DURING PREGNANCY: ICD-10-CM

## 2021-08-19 DIAGNOSIS — E07.9 THYROID DISEASE IN PREGNANCY IN THIRD TRIMESTER: ICD-10-CM

## 2021-08-19 DIAGNOSIS — O99.019 IRON DEFICIENCY ANEMIA DURING PREGNANCY: ICD-10-CM

## 2021-08-19 LAB — TSH SERPL DL<=0.005 MIU/L-ACNC: 0.02 MU/L (ref 0.4–4)

## 2021-08-19 PROCEDURE — 250N000011 HC RX IP 250 OP 636: Performed by: OBSTETRICS & GYNECOLOGY

## 2021-08-19 PROCEDURE — 36415 COLL VENOUS BLD VENIPUNCTURE: CPT

## 2021-08-19 PROCEDURE — 84443 ASSAY THYROID STIM HORMONE: CPT | Performed by: OBSTETRICS & GYNECOLOGY

## 2021-08-19 PROCEDURE — 96374 THER/PROPH/DIAG INJ IV PUSH: CPT

## 2021-08-19 PROCEDURE — 96365 THER/PROPH/DIAG IV INF INIT: CPT

## 2021-08-19 PROCEDURE — 258N000003 HC RX IP 258 OP 636: Performed by: OBSTETRICS & GYNECOLOGY

## 2021-08-19 RX ORDER — METHYLPREDNISOLONE SODIUM SUCCINATE 125 MG/2ML
125 INJECTION, POWDER, LYOPHILIZED, FOR SOLUTION INTRAMUSCULAR; INTRAVENOUS
Status: CANCELLED
Start: 2021-08-20

## 2021-08-19 RX ORDER — HEPARIN SODIUM,PORCINE 10 UNIT/ML
5 VIAL (ML) INTRAVENOUS
Status: CANCELLED | OUTPATIENT
Start: 2021-08-20

## 2021-08-19 RX ORDER — DIPHENHYDRAMINE HYDROCHLORIDE 50 MG/ML
50 INJECTION INTRAMUSCULAR; INTRAVENOUS
Status: CANCELLED
Start: 2021-08-20

## 2021-08-19 RX ORDER — ALBUTEROL SULFATE 90 UG/1
1-2 AEROSOL, METERED RESPIRATORY (INHALATION)
Status: CANCELLED
Start: 2021-08-20

## 2021-08-19 RX ORDER — ALBUTEROL SULFATE 0.83 MG/ML
2.5 SOLUTION RESPIRATORY (INHALATION)
Status: CANCELLED | OUTPATIENT
Start: 2021-08-20

## 2021-08-19 RX ORDER — HEPARIN SODIUM (PORCINE) LOCK FLUSH IV SOLN 100 UNIT/ML 100 UNIT/ML
5 SOLUTION INTRAVENOUS
Status: CANCELLED | OUTPATIENT
Start: 2021-08-20

## 2021-08-19 RX ORDER — NALOXONE HYDROCHLORIDE 0.4 MG/ML
0.2 INJECTION, SOLUTION INTRAMUSCULAR; INTRAVENOUS; SUBCUTANEOUS
Status: CANCELLED | OUTPATIENT
Start: 2021-08-20

## 2021-08-19 RX ORDER — MEPERIDINE HYDROCHLORIDE 25 MG/ML
25 INJECTION INTRAMUSCULAR; INTRAVENOUS; SUBCUTANEOUS EVERY 30 MIN PRN
Status: CANCELLED | OUTPATIENT
Start: 2021-08-20

## 2021-08-19 RX ORDER — EPINEPHRINE 1 MG/ML
0.3 INJECTION, SOLUTION INTRAMUSCULAR; SUBCUTANEOUS EVERY 5 MIN PRN
Status: CANCELLED | OUTPATIENT
Start: 2021-08-20

## 2021-08-19 RX ADMIN — SODIUM CHLORIDE 250 ML: 9 INJECTION, SOLUTION INTRAVENOUS at 15:11

## 2021-08-19 RX ADMIN — IRON SUCROSE 200 MG: 20 INJECTION, SOLUTION INTRAVENOUS at 15:09

## 2021-08-19 NOTE — PROGRESS NOTES
Infusion Nursing Note:  Dave Meyer presents today for venofer 200 4/5.    Patient seen by provider today: No   present during visit today: Not Applicable.    Note: N/A.      Intravenous Access:  Labs drawn without difficulty.  Peripheral IV placed.    Treatment Conditions:  Not Applicable.      Post Infusion Assessment:  Patient tolerated infusion without incident.  Blood return noted pre and post infusion.  Site patent and intact, free from redness, edema or discomfort.  No evidence of extravasations.  Access discontinued per protocol.       Discharge Plan:   Discharge instructions reviewed with: Patient.  Patient and/or family verbalized understanding of discharge instructions and all questions answered.  AVS to patient via [employer].  Patient will return Sunday for next appointment.   Patient discharged in stable condition accompanied by: self.  Departure Mode: Ambulatory.      Carrie Amaro RN

## 2021-08-20 DIAGNOSIS — E03.9 ACQUIRED HYPOTHYROIDISM: ICD-10-CM

## 2021-08-20 RX ORDER — LEVOTHYROXINE SODIUM 50 UG/1
50 TABLET ORAL DAILY
Qty: 30 TABLET | Refills: 3 | Status: SHIPPED | OUTPATIENT
Start: 2021-08-20 | End: 2022-05-19

## 2021-08-22 ENCOUNTER — INFUSION THERAPY VISIT (OUTPATIENT)
Dept: INFUSION THERAPY | Facility: CLINIC | Age: 31
End: 2021-08-22
Attending: OBSTETRICS & GYNECOLOGY
Payer: COMMERCIAL

## 2021-08-22 VITALS
SYSTOLIC BLOOD PRESSURE: 102 MMHG | DIASTOLIC BLOOD PRESSURE: 66 MMHG | TEMPERATURE: 98.5 F | HEART RATE: 86 BPM | RESPIRATION RATE: 16 BRPM

## 2021-08-22 DIAGNOSIS — O09.93 SUPERVISION OF HIGH RISK PREGNANCY IN THIRD TRIMESTER: Primary | ICD-10-CM

## 2021-08-22 DIAGNOSIS — O99.019 IRON DEFICIENCY ANEMIA DURING PREGNANCY: ICD-10-CM

## 2021-08-22 DIAGNOSIS — D50.9 IRON DEFICIENCY ANEMIA DURING PREGNANCY: ICD-10-CM

## 2021-08-22 PROCEDURE — 96365 THER/PROPH/DIAG IV INF INIT: CPT

## 2021-08-22 PROCEDURE — 250N000011 HC RX IP 250 OP 636: Performed by: OBSTETRICS & GYNECOLOGY

## 2021-08-22 PROCEDURE — 258N000003 HC RX IP 258 OP 636: Performed by: OBSTETRICS & GYNECOLOGY

## 2021-08-22 RX ORDER — METHYLPREDNISOLONE SODIUM SUCCINATE 125 MG/2ML
125 INJECTION, POWDER, LYOPHILIZED, FOR SOLUTION INTRAMUSCULAR; INTRAVENOUS
Status: CANCELLED
Start: 2021-08-23

## 2021-08-22 RX ORDER — EPINEPHRINE 1 MG/ML
0.3 INJECTION, SOLUTION INTRAMUSCULAR; SUBCUTANEOUS EVERY 5 MIN PRN
Status: CANCELLED | OUTPATIENT
Start: 2021-08-23

## 2021-08-22 RX ORDER — ALBUTEROL SULFATE 0.83 MG/ML
2.5 SOLUTION RESPIRATORY (INHALATION)
Status: CANCELLED | OUTPATIENT
Start: 2021-08-23

## 2021-08-22 RX ORDER — MEPERIDINE HYDROCHLORIDE 25 MG/ML
25 INJECTION INTRAMUSCULAR; INTRAVENOUS; SUBCUTANEOUS EVERY 30 MIN PRN
Status: CANCELLED | OUTPATIENT
Start: 2021-08-23

## 2021-08-22 RX ORDER — NALOXONE HYDROCHLORIDE 0.4 MG/ML
0.2 INJECTION, SOLUTION INTRAMUSCULAR; INTRAVENOUS; SUBCUTANEOUS
Status: CANCELLED | OUTPATIENT
Start: 2021-08-23

## 2021-08-22 RX ORDER — ALBUTEROL SULFATE 90 UG/1
1-2 AEROSOL, METERED RESPIRATORY (INHALATION)
Status: CANCELLED
Start: 2021-08-23

## 2021-08-22 RX ORDER — HEPARIN SODIUM,PORCINE 10 UNIT/ML
5 VIAL (ML) INTRAVENOUS
Status: CANCELLED | OUTPATIENT
Start: 2021-08-23

## 2021-08-22 RX ORDER — HEPARIN SODIUM (PORCINE) LOCK FLUSH IV SOLN 100 UNIT/ML 100 UNIT/ML
5 SOLUTION INTRAVENOUS
Status: CANCELLED | OUTPATIENT
Start: 2021-08-23

## 2021-08-22 RX ORDER — DIPHENHYDRAMINE HYDROCHLORIDE 50 MG/ML
50 INJECTION INTRAMUSCULAR; INTRAVENOUS
Status: CANCELLED
Start: 2021-08-23

## 2021-08-22 RX ADMIN — SODIUM CHLORIDE 250 ML: 9 INJECTION, SOLUTION INTRAVENOUS at 10:02

## 2021-08-22 RX ADMIN — IRON SUCROSE 200 MG: 20 INJECTION, SOLUTION INTRAVENOUS at 10:02

## 2021-08-22 NOTE — PROGRESS NOTES
Infusion Nursing Note:  Dave TIA Meyer presents today for venofer 200.    Patient seen by provider today: No   present during visit today: Not Applicable.    Note: N/A.      Intravenous Access:  Peripheral IV placed.    Treatment Conditions:  Not Applicable.      Post Infusion Assessment:  Patient tolerated infusion without incident.  Blood return noted pre and post infusion.  Site patent and intact, free from redness, edema or discomfort.  No evidence of extravasations.  Access discontinued per protocol.       Discharge Plan:   Discharge instructions reviewed with: Patient.  Patient and/or family verbalized understanding of discharge instructions and all questions answered.  Patient discharged in stable condition accompanied by: self.  Departure Mode: Ambulatory.      Carrie Amaro RN

## 2021-08-23 ENCOUNTER — PRENATAL OFFICE VISIT (OUTPATIENT)
Dept: OBGYN | Facility: CLINIC | Age: 31
End: 2021-08-23
Payer: COMMERCIAL

## 2021-08-23 VITALS — DIASTOLIC BLOOD PRESSURE: 62 MMHG | WEIGHT: 163 LBS | BODY MASS INDEX: 30.8 KG/M2 | SYSTOLIC BLOOD PRESSURE: 102 MMHG

## 2021-08-23 DIAGNOSIS — E07.9 THYROID DISEASE IN PREGNANCY IN THIRD TRIMESTER: ICD-10-CM

## 2021-08-23 DIAGNOSIS — O99.283 THYROID DISEASE IN PREGNANCY IN THIRD TRIMESTER: ICD-10-CM

## 2021-08-23 DIAGNOSIS — O99.019 IRON DEFICIENCY ANEMIA DURING PREGNANCY: ICD-10-CM

## 2021-08-23 DIAGNOSIS — D50.9 IRON DEFICIENCY ANEMIA DURING PREGNANCY: ICD-10-CM

## 2021-08-23 DIAGNOSIS — O09.93 SUPERVISION OF HIGH RISK PREGNANCY IN THIRD TRIMESTER: Primary | ICD-10-CM

## 2021-08-23 DIAGNOSIS — O99.340: ICD-10-CM

## 2021-08-23 DIAGNOSIS — F32.9: ICD-10-CM

## 2021-08-23 PROCEDURE — 87653 STREP B DNA AMP PROBE: CPT | Performed by: OBSTETRICS & GYNECOLOGY

## 2021-08-23 PROCEDURE — 99207 PR PRENATAL VISIT: CPT | Performed by: OBSTETRICS & GYNECOLOGY

## 2021-08-23 NOTE — PROGRESS NOTES
No loss of fluid/vaginal bleeding/regular contractions. + FM  Feels better s/p IV iron.   -GBS collected  -Hypothyroid. Recent decrease in levothyroxine. No symptoms  -Depression, well controlled on meds.   -Iron def anemia. Just finished IV iron infusions  - Labor precautions. F/U 1wk    Ingris Armstrong Masters, DO

## 2021-08-24 LAB
GP B STREP DNA SPEC QL NAA+PROBE: NEGATIVE
PATIENT PENICILLIN, AMOXICILLIN, CEPHALOSPORINS ALLERGY: NO

## 2021-08-30 ENCOUNTER — PRENATAL OFFICE VISIT (OUTPATIENT)
Dept: OBGYN | Facility: CLINIC | Age: 31
End: 2021-08-30
Payer: COMMERCIAL

## 2021-08-30 VITALS — BODY MASS INDEX: 30.99 KG/M2 | DIASTOLIC BLOOD PRESSURE: 60 MMHG | SYSTOLIC BLOOD PRESSURE: 104 MMHG | WEIGHT: 164 LBS

## 2021-08-30 DIAGNOSIS — O09.93 SUPERVISION OF HIGH RISK PREGNANCY IN THIRD TRIMESTER: Primary | ICD-10-CM

## 2021-08-30 DIAGNOSIS — E07.9 DISORDER OF THYROID, ANTEPARTUM: ICD-10-CM

## 2021-08-30 DIAGNOSIS — O99.280 DISORDER OF THYROID, ANTEPARTUM: ICD-10-CM

## 2021-08-30 PROCEDURE — 99207 PR PRENATAL VISIT: CPT | Performed by: OBSTETRICS & GYNECOLOGY

## 2021-09-10 ENCOUNTER — PRENATAL OFFICE VISIT (OUTPATIENT)
Dept: OBGYN | Facility: CLINIC | Age: 31
End: 2021-09-10
Payer: COMMERCIAL

## 2021-09-10 VITALS — SYSTOLIC BLOOD PRESSURE: 110 MMHG | DIASTOLIC BLOOD PRESSURE: 66 MMHG | BODY MASS INDEX: 31.18 KG/M2 | WEIGHT: 165 LBS

## 2021-09-10 DIAGNOSIS — O09.93 SUPERVISION OF HIGH RISK PREGNANCY IN THIRD TRIMESTER: Primary | ICD-10-CM

## 2021-09-10 DIAGNOSIS — O99.340: ICD-10-CM

## 2021-09-10 DIAGNOSIS — Z23 NEED FOR PROPHYLACTIC VACCINATION AND INOCULATION AGAINST INFLUENZA: ICD-10-CM

## 2021-09-10 DIAGNOSIS — E07.9 DISORDER OF THYROID, ANTEPARTUM: ICD-10-CM

## 2021-09-10 DIAGNOSIS — O99.280 DISORDER OF THYROID, ANTEPARTUM: ICD-10-CM

## 2021-09-10 DIAGNOSIS — F32.9: ICD-10-CM

## 2021-09-10 PROCEDURE — 90471 IMMUNIZATION ADMIN: CPT | Performed by: OBSTETRICS & GYNECOLOGY

## 2021-09-10 PROCEDURE — 90686 IIV4 VACC NO PRSV 0.5 ML IM: CPT | Performed by: OBSTETRICS & GYNECOLOGY

## 2021-09-10 PROCEDURE — 99207 PR PRENATAL VISIT: CPT | Performed by: OBSTETRICS & GYNECOLOGY

## 2021-09-10 NOTE — PROGRESS NOTES
No loss of fluid/vaginal bleeding/regular contractions. + FM  -Desires IOL 9/16, scheduled. Will give beal scheduling paper next visit  COVID test on Monday with next visit  Gaspar 7  -GBS neg  -Labor precautions. F/U 1wk    Ingris Armstrong Masters, DO

## 2021-09-12 ENCOUNTER — LAB (OUTPATIENT)
Dept: URGENT CARE | Facility: URGENT CARE | Age: 31
End: 2021-09-12
Attending: OBSTETRICS & GYNECOLOGY
Payer: COMMERCIAL

## 2021-09-12 DIAGNOSIS — O09.93 SUPERVISION OF HIGH RISK PREGNANCY IN THIRD TRIMESTER: ICD-10-CM

## 2021-09-12 PROCEDURE — U0003 INFECTIOUS AGENT DETECTION BY NUCLEIC ACID (DNA OR RNA); SEVERE ACUTE RESPIRATORY SYNDROME CORONAVIRUS 2 (SARS-COV-2) (CORONAVIRUS DISEASE [COVID-19]), AMPLIFIED PROBE TECHNIQUE, MAKING USE OF HIGH THROUGHPUT TECHNOLOGIES AS DESCRIBED BY CMS-2020-01-R: HCPCS

## 2021-09-12 PROCEDURE — U0005 INFEC AGEN DETEC AMPLI PROBE: HCPCS

## 2021-09-13 ENCOUNTER — PRENATAL OFFICE VISIT (OUTPATIENT)
Dept: OBGYN | Facility: CLINIC | Age: 31
End: 2021-09-13
Payer: COMMERCIAL

## 2021-09-13 VITALS — WEIGHT: 168 LBS | BODY MASS INDEX: 31.74 KG/M2 | DIASTOLIC BLOOD PRESSURE: 66 MMHG | SYSTOLIC BLOOD PRESSURE: 106 MMHG

## 2021-09-13 DIAGNOSIS — E07.9 DISORDER OF THYROID, ANTEPARTUM: ICD-10-CM

## 2021-09-13 DIAGNOSIS — O99.280 DISORDER OF THYROID, ANTEPARTUM: ICD-10-CM

## 2021-09-13 DIAGNOSIS — O09.93 SUPERVISION OF HIGH RISK PREGNANCY IN THIRD TRIMESTER: Primary | ICD-10-CM

## 2021-09-13 LAB — SARS-COV-2 RNA RESP QL NAA+PROBE: NEGATIVE

## 2021-09-13 PROCEDURE — 99207 PR PRENATAL VISIT: CPT | Performed by: OBSTETRICS & GYNECOLOGY

## 2021-09-13 NOTE — PROGRESS NOTES
No loss of fluid/vaginal bleeding/regular contractions. + FM  -IOL scheduled for Thurs am 0730. Covid neg yesterday  -GBS neg  -Labor precautions. F/U prn    Ingris Armstrong Masters, DO

## 2021-09-16 ENCOUNTER — ANESTHESIA EVENT (OUTPATIENT)
Dept: OBGYN | Facility: CLINIC | Age: 31
End: 2021-09-16
Payer: COMMERCIAL

## 2021-09-16 ENCOUNTER — HOSPITAL ENCOUNTER (INPATIENT)
Facility: CLINIC | Age: 31
LOS: 1 days | Discharge: HOME | End: 2021-09-17
Attending: OBSTETRICS & GYNECOLOGY | Admitting: OBSTETRICS & GYNECOLOGY
Payer: COMMERCIAL

## 2021-09-16 ENCOUNTER — ANESTHESIA (OUTPATIENT)
Dept: OBGYN | Facility: CLINIC | Age: 31
End: 2021-09-16
Payer: COMMERCIAL

## 2021-09-16 DIAGNOSIS — O09.93 SUPERVISION OF HIGH RISK PREGNANCY IN THIRD TRIMESTER: Primary | ICD-10-CM

## 2021-09-16 LAB
ABO/RH(D): NORMAL
ANTIBODY SCREEN: NEGATIVE
BASOPHILS # BLD AUTO: 0 10E3/UL (ref 0–0.2)
BASOPHILS NFR BLD AUTO: 0 %
EOSINOPHIL # BLD AUTO: 0.1 10E3/UL (ref 0–0.7)
EOSINOPHIL NFR BLD AUTO: 1 %
ERYTHROCYTE [DISTWIDTH] IN BLOOD BY AUTOMATED COUNT: 23.4 % (ref 10–15)
HCT VFR BLD AUTO: 35.1 % (ref 35–47)
HGB BLD-MCNC: 11.1 G/DL (ref 11.7–15.7)
IMM GRANULOCYTES # BLD: 0.1 10E3/UL
IMM GRANULOCYTES NFR BLD: 1 %
LYMPHOCYTES # BLD AUTO: 1.2 10E3/UL (ref 0.8–5.3)
LYMPHOCYTES NFR BLD AUTO: 17 %
MCH RBC QN AUTO: 28 PG (ref 26.5–33)
MCHC RBC AUTO-ENTMCNC: 31.6 G/DL (ref 31.5–36.5)
MCV RBC AUTO: 88 FL (ref 78–100)
MONOCYTES # BLD AUTO: 0.6 10E3/UL (ref 0–1.3)
MONOCYTES NFR BLD AUTO: 9 %
NEUTROPHILS # BLD AUTO: 5.2 10E3/UL (ref 1.6–8.3)
NEUTROPHILS NFR BLD AUTO: 72 %
NRBC # BLD AUTO: 0 10E3/UL
NRBC BLD AUTO-RTO: 0 /100
PLATELET # BLD AUTO: 133 10E3/UL (ref 150–450)
RBC # BLD AUTO: 3.97 10E6/UL (ref 3.8–5.2)
SPECIMEN EXPIRATION DATE: NORMAL
T PALLIDUM AB SER QL: NONREACTIVE
WBC # BLD AUTO: 7.1 10E3/UL (ref 4–11)

## 2021-09-16 PROCEDURE — 250N000009 HC RX 250: Performed by: OBSTETRICS & GYNECOLOGY

## 2021-09-16 PROCEDURE — 86901 BLOOD TYPING SEROLOGIC RH(D): CPT | Performed by: OBSTETRICS & GYNECOLOGY

## 2021-09-16 PROCEDURE — 258N000003 HC RX IP 258 OP 636: Performed by: OBSTETRICS & GYNECOLOGY

## 2021-09-16 PROCEDURE — 370N000003 HC ANESTHESIA WARD SERVICE

## 2021-09-16 PROCEDURE — 3E0R3BZ INTRODUCTION OF ANESTHETIC AGENT INTO SPINAL CANAL, PERCUTANEOUS APPROACH: ICD-10-PCS | Performed by: ANESTHESIOLOGY

## 2021-09-16 PROCEDURE — 59400 OBSTETRICAL CARE: CPT | Performed by: OBSTETRICS & GYNECOLOGY

## 2021-09-16 PROCEDURE — 250N000013 HC RX MED GY IP 250 OP 250 PS 637: Performed by: OBSTETRICS & GYNECOLOGY

## 2021-09-16 PROCEDURE — 86780 TREPONEMA PALLIDUM: CPT | Performed by: OBSTETRICS & GYNECOLOGY

## 2021-09-16 PROCEDURE — 00HU33Z INSERTION OF INFUSION DEVICE INTO SPINAL CANAL, PERCUTANEOUS APPROACH: ICD-10-PCS | Performed by: ANESTHESIOLOGY

## 2021-09-16 PROCEDURE — 250N000011 HC RX IP 250 OP 636: Performed by: ANESTHESIOLOGY

## 2021-09-16 PROCEDURE — 120N000012 HC R&B POSTPARTUM

## 2021-09-16 PROCEDURE — 0HQ9XZZ REPAIR PERINEUM SKIN, EXTERNAL APPROACH: ICD-10-PCS | Performed by: OBSTETRICS & GYNECOLOGY

## 2021-09-16 PROCEDURE — 85025 COMPLETE CBC W/AUTO DIFF WBC: CPT | Performed by: OBSTETRICS & GYNECOLOGY

## 2021-09-16 PROCEDURE — 722N000001 HC LABOR CARE VAGINAL DELIVERY SINGLE

## 2021-09-16 PROCEDURE — 10907ZC DRAINAGE OF AMNIOTIC FLUID, THERAPEUTIC FROM PRODUCTS OF CONCEPTION, VIA NATURAL OR ARTIFICIAL OPENING: ICD-10-PCS | Performed by: OBSTETRICS & GYNECOLOGY

## 2021-09-16 PROCEDURE — 36415 COLL VENOUS BLD VENIPUNCTURE: CPT | Performed by: OBSTETRICS & GYNECOLOGY

## 2021-09-16 RX ORDER — DOCUSATE SODIUM 100 MG/1
100 CAPSULE, LIQUID FILLED ORAL 2 TIMES DAILY PRN
Qty: 60 CAPSULE | Refills: 0 | Status: SHIPPED | OUTPATIENT
Start: 2021-09-16 | End: 2022-06-15

## 2021-09-16 RX ORDER — NALOXONE HYDROCHLORIDE 0.4 MG/ML
0.4 INJECTION, SOLUTION INTRAMUSCULAR; INTRAVENOUS; SUBCUTANEOUS
Status: DISCONTINUED | OUTPATIENT
Start: 2021-09-16 | End: 2021-09-17 | Stop reason: HOSPADM

## 2021-09-16 RX ORDER — METOCLOPRAMIDE 10 MG/1
10 TABLET ORAL EVERY 6 HOURS PRN
Status: DISCONTINUED | OUTPATIENT
Start: 2021-09-16 | End: 2021-09-16

## 2021-09-16 RX ORDER — DOCUSATE SODIUM 100 MG/1
100 CAPSULE, LIQUID FILLED ORAL DAILY
Status: DISCONTINUED | OUTPATIENT
Start: 2021-09-16 | End: 2021-09-17 | Stop reason: HOSPADM

## 2021-09-16 RX ORDER — NALOXONE HYDROCHLORIDE 0.4 MG/ML
0.4 INJECTION, SOLUTION INTRAMUSCULAR; INTRAVENOUS; SUBCUTANEOUS
Status: DISCONTINUED | OUTPATIENT
Start: 2021-09-16 | End: 2021-09-16

## 2021-09-16 RX ORDER — MODIFIED LANOLIN
OINTMENT (GRAM) TOPICAL
Status: DISCONTINUED | OUTPATIENT
Start: 2021-09-16 | End: 2021-09-17 | Stop reason: HOSPADM

## 2021-09-16 RX ORDER — ROPIVACAINE HYDROCHLORIDE 2 MG/ML
10 INJECTION, SOLUTION EPIDURAL; INFILTRATION; PERINEURAL ONCE
Status: DISCONTINUED | OUTPATIENT
Start: 2021-09-16 | End: 2021-09-17 | Stop reason: HOSPADM

## 2021-09-16 RX ORDER — OXYTOCIN/0.9 % SODIUM CHLORIDE 30/500 ML
100-340 PLASTIC BAG, INJECTION (ML) INTRAVENOUS CONTINUOUS PRN
Status: DISCONTINUED | OUTPATIENT
Start: 2021-09-16 | End: 2021-09-16

## 2021-09-16 RX ORDER — NALBUPHINE HYDROCHLORIDE 10 MG/ML
2.5-5 INJECTION, SOLUTION INTRAMUSCULAR; INTRAVENOUS; SUBCUTANEOUS EVERY 6 HOURS PRN
Status: DISCONTINUED | OUTPATIENT
Start: 2021-09-16 | End: 2021-09-17 | Stop reason: HOSPADM

## 2021-09-16 RX ORDER — BISACODYL 10 MG
10 SUPPOSITORY, RECTAL RECTAL DAILY PRN
Status: DISCONTINUED | OUTPATIENT
Start: 2021-09-16 | End: 2021-09-17 | Stop reason: HOSPADM

## 2021-09-16 RX ORDER — MISOPROSTOL 200 UG/1
800 TABLET ORAL
Status: DISCONTINUED | OUTPATIENT
Start: 2021-09-16 | End: 2021-09-17 | Stop reason: HOSPADM

## 2021-09-16 RX ORDER — LEVOTHYROXINE SODIUM 50 UG/1
50 TABLET ORAL DAILY
Status: DISCONTINUED | OUTPATIENT
Start: 2021-09-17 | End: 2021-09-17 | Stop reason: HOSPADM

## 2021-09-16 RX ORDER — CARBOPROST TROMETHAMINE 250 UG/ML
250 INJECTION, SOLUTION INTRAMUSCULAR
Status: DISCONTINUED | OUTPATIENT
Start: 2021-09-16 | End: 2021-09-17 | Stop reason: HOSPADM

## 2021-09-16 RX ORDER — ONDANSETRON 4 MG/1
4 TABLET, ORALLY DISINTEGRATING ORAL EVERY 6 HOURS PRN
Status: DISCONTINUED | OUTPATIENT
Start: 2021-09-16 | End: 2021-09-16

## 2021-09-16 RX ORDER — TRANEXAMIC ACID 10 MG/ML
1 INJECTION, SOLUTION INTRAVENOUS EVERY 30 MIN PRN
Status: DISCONTINUED | OUTPATIENT
Start: 2021-09-16 | End: 2021-09-17 | Stop reason: HOSPADM

## 2021-09-16 RX ORDER — MISOPROSTOL 200 UG/1
400 TABLET ORAL
Status: DISCONTINUED | OUTPATIENT
Start: 2021-09-16 | End: 2021-09-17 | Stop reason: HOSPADM

## 2021-09-16 RX ORDER — LIDOCAINE 40 MG/G
CREAM TOPICAL
Status: DISCONTINUED | OUTPATIENT
Start: 2021-09-16 | End: 2021-09-16

## 2021-09-16 RX ORDER — EPHEDRINE SULFATE 50 MG/ML
5 INJECTION, SOLUTION INTRAMUSCULAR; INTRAVENOUS; SUBCUTANEOUS
Status: DISCONTINUED | OUTPATIENT
Start: 2021-09-16 | End: 2021-09-17 | Stop reason: HOSPADM

## 2021-09-16 RX ORDER — SODIUM CHLORIDE, SODIUM LACTATE, POTASSIUM CHLORIDE, CALCIUM CHLORIDE 600; 310; 30; 20 MG/100ML; MG/100ML; MG/100ML; MG/100ML
INJECTION, SOLUTION INTRAVENOUS CONTINUOUS
Status: DISCONTINUED | OUTPATIENT
Start: 2021-09-16 | End: 2021-09-16

## 2021-09-16 RX ORDER — BUSPIRONE HYDROCHLORIDE 10 MG/1
20 TABLET ORAL 2 TIMES DAILY
Status: DISCONTINUED | OUTPATIENT
Start: 2021-09-16 | End: 2021-09-17 | Stop reason: HOSPADM

## 2021-09-16 RX ORDER — MISOPROSTOL 200 UG/1
400 TABLET ORAL
Status: DISCONTINUED | OUTPATIENT
Start: 2021-09-16 | End: 2021-09-16

## 2021-09-16 RX ORDER — OXYTOCIN/0.9 % SODIUM CHLORIDE 30/500 ML
340 PLASTIC BAG, INJECTION (ML) INTRAVENOUS CONTINUOUS PRN
Status: DISCONTINUED | OUTPATIENT
Start: 2021-09-16 | End: 2021-09-17 | Stop reason: HOSPADM

## 2021-09-16 RX ORDER — PROCHLORPERAZINE 25 MG
25 SUPPOSITORY, RECTAL RECTAL EVERY 12 HOURS PRN
Status: DISCONTINUED | OUTPATIENT
Start: 2021-09-16 | End: 2021-09-16

## 2021-09-16 RX ORDER — ACETAMINOPHEN 325 MG/1
650 TABLET ORAL EVERY 4 HOURS PRN
Qty: 90 TABLET | Refills: 0 | Status: SHIPPED | OUTPATIENT
Start: 2021-09-16 | End: 2022-06-15

## 2021-09-16 RX ORDER — PROCHLORPERAZINE MALEATE 5 MG
10 TABLET ORAL EVERY 6 HOURS PRN
Status: DISCONTINUED | OUTPATIENT
Start: 2021-09-16 | End: 2021-09-16

## 2021-09-16 RX ORDER — NALOXONE HYDROCHLORIDE 0.4 MG/ML
0.2 INJECTION, SOLUTION INTRAMUSCULAR; INTRAVENOUS; SUBCUTANEOUS
Status: DISCONTINUED | OUTPATIENT
Start: 2021-09-16 | End: 2021-09-16

## 2021-09-16 RX ORDER — HYDROCORTISONE 2.5 %
CREAM (GRAM) TOPICAL 3 TIMES DAILY PRN
Status: DISCONTINUED | OUTPATIENT
Start: 2021-09-16 | End: 2021-09-17 | Stop reason: HOSPADM

## 2021-09-16 RX ORDER — KETOROLAC TROMETHAMINE 30 MG/ML
30 INJECTION, SOLUTION INTRAMUSCULAR; INTRAVENOUS
Status: DISCONTINUED | OUTPATIENT
Start: 2021-09-16 | End: 2021-09-16

## 2021-09-16 RX ORDER — NALOXONE HYDROCHLORIDE 0.4 MG/ML
0.2 INJECTION, SOLUTION INTRAMUSCULAR; INTRAVENOUS; SUBCUTANEOUS
Status: DISCONTINUED | OUTPATIENT
Start: 2021-09-16 | End: 2021-09-17 | Stop reason: HOSPADM

## 2021-09-16 RX ORDER — METHYLERGONOVINE MALEATE 0.2 MG/ML
200 INJECTION INTRAVENOUS
Status: DISCONTINUED | OUTPATIENT
Start: 2021-09-16 | End: 2021-09-16

## 2021-09-16 RX ORDER — IBUPROFEN 400 MG/1
800 TABLET, FILM COATED ORAL EVERY 6 HOURS PRN
Status: DISCONTINUED | OUTPATIENT
Start: 2021-09-16 | End: 2021-09-17 | Stop reason: HOSPADM

## 2021-09-16 RX ORDER — CARBOPROST TROMETHAMINE 250 UG/ML
250 INJECTION, SOLUTION INTRAMUSCULAR
Status: DISCONTINUED | OUTPATIENT
Start: 2021-09-16 | End: 2021-09-16

## 2021-09-16 RX ORDER — OXYTOCIN 10 [USP'U]/ML
10 INJECTION, SOLUTION INTRAMUSCULAR; INTRAVENOUS
Status: DISCONTINUED | OUTPATIENT
Start: 2021-09-16 | End: 2021-09-17 | Stop reason: HOSPADM

## 2021-09-16 RX ORDER — FENTANYL CITRATE 50 UG/ML
50-100 INJECTION, SOLUTION INTRAMUSCULAR; INTRAVENOUS
Status: DISCONTINUED | OUTPATIENT
Start: 2021-09-16 | End: 2021-09-16

## 2021-09-16 RX ORDER — ACETAMINOPHEN 325 MG/1
650 TABLET ORAL EVERY 4 HOURS PRN
Status: DISCONTINUED | OUTPATIENT
Start: 2021-09-16 | End: 2021-09-16

## 2021-09-16 RX ORDER — ONDANSETRON 4 MG/1
4 TABLET, ORALLY DISINTEGRATING ORAL EVERY 6 HOURS PRN
Status: DISCONTINUED | OUTPATIENT
Start: 2021-09-16 | End: 2021-09-17 | Stop reason: HOSPADM

## 2021-09-16 RX ORDER — METHYLERGONOVINE MALEATE 0.2 MG/ML
200 INJECTION INTRAVENOUS
Status: DISCONTINUED | OUTPATIENT
Start: 2021-09-16 | End: 2021-09-17 | Stop reason: HOSPADM

## 2021-09-16 RX ORDER — OXYTOCIN 10 [USP'U]/ML
10 INJECTION, SOLUTION INTRAMUSCULAR; INTRAVENOUS
Status: DISCONTINUED | OUTPATIENT
Start: 2021-09-16 | End: 2021-09-16

## 2021-09-16 RX ORDER — OXYTOCIN/0.9 % SODIUM CHLORIDE 30/500 ML
340 PLASTIC BAG, INJECTION (ML) INTRAVENOUS CONTINUOUS PRN
Status: DISCONTINUED | OUTPATIENT
Start: 2021-09-16 | End: 2021-09-16

## 2021-09-16 RX ORDER — OXYTOCIN/0.9 % SODIUM CHLORIDE 30/500 ML
1-24 PLASTIC BAG, INJECTION (ML) INTRAVENOUS CONTINUOUS
Status: DISCONTINUED | OUTPATIENT
Start: 2021-09-16 | End: 2021-09-16

## 2021-09-16 RX ORDER — IBUPROFEN 800 MG/1
800 TABLET, FILM COATED ORAL EVERY 8 HOURS PRN
Qty: 90 TABLET | Refills: 0 | Status: SHIPPED | OUTPATIENT
Start: 2021-09-16 | End: 2022-06-15

## 2021-09-16 RX ORDER — MISOPROSTOL 200 UG/1
800 TABLET ORAL
Status: DISCONTINUED | OUTPATIENT
Start: 2021-09-16 | End: 2021-09-16

## 2021-09-16 RX ORDER — OXYCODONE HYDROCHLORIDE 5 MG/1
5 TABLET ORAL EVERY 4 HOURS PRN
Status: DISCONTINUED | OUTPATIENT
Start: 2021-09-16 | End: 2021-09-17 | Stop reason: HOSPADM

## 2021-09-16 RX ORDER — ONDANSETRON 2 MG/ML
4 INJECTION INTRAMUSCULAR; INTRAVENOUS EVERY 6 HOURS PRN
Status: DISCONTINUED | OUTPATIENT
Start: 2021-09-16 | End: 2021-09-17 | Stop reason: HOSPADM

## 2021-09-16 RX ORDER — ROPIVACAINE HYDROCHLORIDE 2 MG/ML
INJECTION, SOLUTION EPIDURAL; INFILTRATION; PERINEURAL
Status: COMPLETED | OUTPATIENT
Start: 2021-09-16 | End: 2021-09-16

## 2021-09-16 RX ORDER — ONDANSETRON 2 MG/ML
4 INJECTION INTRAMUSCULAR; INTRAVENOUS EVERY 6 HOURS PRN
Status: DISCONTINUED | OUTPATIENT
Start: 2021-09-16 | End: 2021-09-16

## 2021-09-16 RX ORDER — ACETAMINOPHEN 325 MG/1
650 TABLET ORAL EVERY 4 HOURS PRN
Status: DISCONTINUED | OUTPATIENT
Start: 2021-09-16 | End: 2021-09-17 | Stop reason: HOSPADM

## 2021-09-16 RX ORDER — TRANEXAMIC ACID 10 MG/ML
1 INJECTION, SOLUTION INTRAVENOUS EVERY 30 MIN PRN
Status: DISCONTINUED | OUTPATIENT
Start: 2021-09-16 | End: 2021-09-16

## 2021-09-16 RX ORDER — METOCLOPRAMIDE HYDROCHLORIDE 5 MG/ML
10 INJECTION INTRAMUSCULAR; INTRAVENOUS EVERY 6 HOURS PRN
Status: DISCONTINUED | OUTPATIENT
Start: 2021-09-16 | End: 2021-09-16

## 2021-09-16 RX ORDER — IBUPROFEN 600 MG/1
600 TABLET, FILM COATED ORAL
Status: DISCONTINUED | OUTPATIENT
Start: 2021-09-16 | End: 2021-09-16

## 2021-09-16 RX ADMIN — DOCUSATE SODIUM 100 MG: 100 CAPSULE, LIQUID FILLED ORAL at 22:15

## 2021-09-16 RX ADMIN — SODIUM CHLORIDE, POTASSIUM CHLORIDE, SODIUM LACTATE AND CALCIUM CHLORIDE: 600; 310; 30; 20 INJECTION, SOLUTION INTRAVENOUS at 08:10

## 2021-09-16 RX ADMIN — ACETAMINOPHEN 650 MG: 325 TABLET, FILM COATED ORAL at 22:15

## 2021-09-16 RX ADMIN — Medication: at 11:11

## 2021-09-16 RX ADMIN — IBUPROFEN 600 MG: 600 TABLET ORAL at 16:51

## 2021-09-16 RX ADMIN — IBUPROFEN 800 MG: 400 TABLET, FILM COATED ORAL at 22:15

## 2021-09-16 RX ADMIN — SODIUM CHLORIDE, POTASSIUM CHLORIDE, SODIUM LACTATE AND CALCIUM CHLORIDE: 600; 310; 30; 20 INJECTION, SOLUTION INTRAVENOUS at 11:09

## 2021-09-16 RX ADMIN — ROPIVACAINE HYDROCHLORIDE 10 ML: 2 INJECTION, SOLUTION EPIDURAL; INFILTRATION at 11:12

## 2021-09-16 RX ADMIN — BENZOCAINE: 11.4 AEROSOL, SPRAY TOPICAL at 22:15

## 2021-09-16 RX ADMIN — BUSPIRONE HYDROCHLORIDE 20 MG: 10 TABLET ORAL at 19:02

## 2021-09-16 RX ADMIN — ACETAMINOPHEN 650 MG: 325 TABLET, FILM COATED ORAL at 16:51

## 2021-09-16 RX ADMIN — Medication 1 MILLI-UNITS/MIN: at 09:34

## 2021-09-16 ASSESSMENT — MIFFLIN-ST. JEOR: SCORE: 1409.42

## 2021-09-16 NOTE — PLAN OF CARE
Pt admitted to room 215 for induction. EUM/ ultrasound applied/  Admission database obtained and prenatal info reviewed. PIV inserted, pt started feeling lightheaded, diaphoretic. Serial BP's initiated and pt turned to left lateral position. Pt gradually started to feel better and VSS. Dr. Briones came to bedside to assess pt and discuss POC. SVE/AROM performed. Mec fluid noted. Plan to start pitocin if pt does not go into labor on own.

## 2021-09-16 NOTE — PLAN OF CARE
Epidural removed, tip intact. Pt up to void w/ success. Transferred to room 430 via wheelchair w/ baby in arms. Report given to Charlotte RYAN RN who will assume cares.

## 2021-09-16 NOTE — PLAN OF CARE
Vaginal delivery by Dr. Briones with use of kiwi VE x1 contraction while baby's head was at perineum. Pop off x1. Delivery team present. Episiotomy repaired by MD. APGARS 8, & 9. See delivery anny for further details.

## 2021-09-16 NOTE — H&P
Admission Note:    No significant change in general health status based on examination of the patient, review of Nursing Admission Database and prenatal record.  29yo  @ 39+3wk presenting for elective IOL.  Pregnancy c/b hypothyroidism stable on syntrhoid. Depression history stable on zoloft/buspar. Iron deficiency anemia s/p IV iron. GBS neg.     reactive  [place]: q 10, irritability  EFW: 7lb   SVE 4/70/-1, AROM for augmentation showing thick meconium    Anesthesia available per pt desire.  CBC, type and screen ordered    Primary OB: Anali Damians, DO  2021

## 2021-09-16 NOTE — PLAN OF CARE
1020: Pt requested epidural. Fluid Bolus initiated.    1024: IV infiltrated.     1029: IV restarted in R hand. Bolus restarted.    1045: [facility] notified for Epidural placement.    1055: Dr. Rogers at bedside.Time out performed.     1057: Consent obtained.    1104: Test dose    1111: CADD started with Dr. Rogers .

## 2021-09-16 NOTE — L&D DELIVERY NOTE
OB Vaginal Delivery Note    Dave Meyer MRN# [id number]   Age: 30 year old YOB: 1990       GA: 39w3d  GP:   Labor Complications: Fetal Intolerance    Admission diagnosis: Elective IOL, 39+3wk, hypothyroidism, depression controlled on meds  Delivery QBL: 103 mL  Delivery Type: Vaginal, Vacuum (Extractor)    Procedure: VAVD, episiotomy for fetal bradycardia  ROM to Delivery Time: (Delivered) Hours: 6 Minutes: 31  Findings: OA infant girl  Dante Weight: 3.84 kg (8 lb 7.5 oz)    1 Minute 5 Minute 10 Minute   Apgar Totals: 8   9        THUAN KELLEY;DEREK FUNK;YANDY VÁZQUEZ;KELIN UREÑA;TALYA MADRIGAL     Delivery Details:  Dave Meyer, a 30 year old  female delivered a viable infant with apgars of 8  and 9 . Patient was fully dilated and pushing after 4  hours 45  minutes in active labor. Delivery was via vaginal, vacuum (extractor)  to a sterile field under epidural  anesthesia.  Called for delivery, baby with 3 minute bradycardia following practice push. Resolved with positioning to side.   Upon entry into room, pt resumed pushing. Developed bradycardia to 60-70's which did not resolve with position changes. Discussed need for vacuum delivery vs  section. Pt and  at first declined vacuum and then upon further considering, Dave decided she wanted to try one more time to push. Risks of vacuum discussed, pt has hx of vacuum delivery with first delivery. She was repositioned to her back. FHT in the 80s, had been down for 5 min. With first push, baby descended to 4+, but was held up by tight vaginal skin. With next push Kiwi placed on fetal head and with coordinated maternal effort one pull for about 5 seconds before pop off occurred. Consent obtained for episiotomy, performed with scissors 1cm of tissue. With the next contraction pt pushed and head delivered, followed by shoulders and remainder of infant. NICU team in room already.  Baby placed on maternal abdomen for suctioning and resuscitative measures. Good tone present.   The cord was clamped, cut twice and 3 vessels  were noted. Cord blood was obtained in routine fashion with the following disposition: lab .      Cord complications: none   Placenta delivered at 9/16/2021  3:13 PM . Placental disposition was Hospital disposal . Fundal massage performed and fundus found to be firm.     Episiotomy: median    Perineum, vagina, cervix were inspected, and the following lacerations were noted:   Perineal lacerations: 1st    Any lacerations were repaired in the usual fashion using 2-0 vicryl.    Excellent hemostasis was noted. Needle count correct. Infant and patient in delivery room in good and stable condition.        Cristina MeyerEuniceDave [[id number]]    Labor Event Times    Labor onset date: 9/16/21 Onset time: 10:00 AM   Dilation complete date: 9/16/21 Complete time:  2:45 PM   Start pushing date/time: 9/16/2021 1458      Labor Length    1st Stage (hrs): 4 (min): 45   2nd Stage (hrs): 0 (min): 22   3rd Stage (hrs): 0 (min): 6      Rupture date/time: 9/16/21 0836   Rupture type: Artificial Rupture of Membranes  Fluid color: Meconium  Fluid odor: Normal     Induction: AROM, Oxytocin  Induction date/time:     Cervical ripening date/time:     Indications for induction: Elective     Augmentation: Oxytocin     Delivery/Placenta Date and Time    Delivery Date: 9/16/21 Delivery Time:  3:07 PM   Placenta Date/Time: 9/16/2021  3:13 PM  Oxytocin given at the time of delivery: after delivery of baby  Delivering clinician: Ingris Briones,    Other personnel present at delivery:  Provider Role   July Gao RN Dugan, Patricia Jaramillo, FRIEDA Benitez Jessye Marie, RN Gizinski, Tiara Klein RN          Vaginal Counts     Initial count performed by 2 team members:  Two Team Members   Murray Briones       Needles Suture Needles Sponges (RETIRED) Instruments  "  Initial counts 2 0 5    Added to count  1     Relief counts       Final counts 2 1 5          Placed during labor Accounted for at the end of labor   FSE NA    IUPC NA    Cervadil NA               Final count performed by 2 team members:  Two Team Members   Rustad   Masters      Final count correct?: Yes     Apgars    Living status: Living   1 Minute 5 Minute 10 Minute 15 Minute 20 Minute   Skin color: 0  1       Heart rate: 2  2       Reflex irritability: 2  2       Muscle tone: 2  2       Respiratory effort: 2  2       Total: 8  9       Apgars assigned by: FRIEDA OLIVA CNP     Cord    Vessels: 3 Vessels    Cord Complications: None               Cord Blood Disposition: Lab    Gases Sent?: Yes    Delayed cord clamping?: Yes    Cord Clamping Delay (seconds): 31-60 seconds    Stem cell collection?: No        Resuscitation    Methods: Suctioning, Oximetry   Care at Delivery: Called for  Vacuum assist delivery. Infant 45 seconds old before crying after stimulation.  Infant brought to warmer crying, but still dusky. Pulse oximeter placed and sats 56% more stimulation and suctioned for moderate light green secretions. After suctioning and stimulating infants saturations > 90% and color was pink. Breath sounds + clear no distress noted.  Ludivina Jaramillo, DAVID, CNP 2021 4:53 PM  Output in Delivery Room: Stool     [place] Measurements    Weight: 8 lb 7.5 oz Length: 1' 9\"   Head circumference: 34.9 cm    Output in delivery room: Stool     Skin to Skin and Feeding Plan    Skin to skin initiation date/time: 1841    Skin to skin with: Mother  Skin to skin end date/time:        Labor Events and Shoulder Dystocia    Fetal Tracing Prior to Delivery: Category 3  Fetal Tracing Comments: Bradycardia despite resuscitation measures x 5 min  Shoulder dystocia present?: Neg     Delivery (Maternal) (Provider to Complete) ([id number])    Episiotomy: Median  Reason for episiotomy: prolonged fetal bradycardia, " performed to expedite delivery, tight perineum holding up head delivery    Perineal lacerations: 1st Repaired?: Yes   Repair suture: 2-0 Vicryl  Genital tract inspection done: Pos     Blood Loss  Mother: Dave Meyer P #[id number]   Start of Mother's Information    Delivery Blood Loss  21 1000 - 21 2144    Delivery QBL (mL) Hospital Encounter 103 mL    Total  103 mL         End of Mother's Information  Mother: Dave Meyer P #[id number]          Delivery - Provider to Complete ([id number])    Delivering clinician: Ingris Briones, DO  Attempted Delivery Types (Choose all that apply): Spontaneous Vaginal Delivery  Delivery Type (Choose the 1 that will go to the Birth History): Vaginal, Vacuum (Extractor)    Verbal Informed Consent Obtained For Vacuum: Yes Alternate Labor Strategies Discussed (vacuum): Yes    Emergency Resources Available (vacuum): Charge Nurse/Team, Resuscitation Team   Type (vacuum): kiwi Accrued Pulling Time (vacuum):  5 seconds      # of Pop-Offs (vacuum): 1 # of Pulls/Contractions (vacuum): 1   Position (vacuum): OA Fetal Station (vacuum): +3      Indication for Operative Vaginal Delivery (vacuum): Fetal Status   Operative Vaginal Delivery Brief Note Vacuum: Called for delivery, baby with 3 minute bradycardia following practice push. Resolved with positioning to side.   Upon entry into room, pt resumed pushing. Developed bradycardia to 60-70's which did not resolve with position changes. Discussed need for vacuum delivery vs  section. Pt and  at first declined vacuum and then upon further considering, Dave decided she wanted to try one more time to push. Risks of vacuum discussed, pt has hx of vacuum delivery with first delivery. She was repositioned to her back. FHT in the 80s, had been down for 5 min. With first push, baby descended to 4+, but was held up by tight vaginal skin. With next push Kiwi placed on fetal head and with coordinated maternal effort  one pull for about 5 seconds before pop off occurred. Consent obtained for episiotomy, performed with scissors 1cm of tissue. With the next contraction pt pushed and head delivered, followed by shoulders and remainder of infant. NICU team in room already. Baby placed on maternal abdomen for suctioning and resuscitative measures. Good tone present.                Other personnel:  Provider Role   July Gao RN Dugan, Patricia Jaramillo, FRIEDA Benitez Jessye Marie, PADMINI Arnold, Tiara Klein RN                 Placenta    Date/Time: 9/16/2021  3:13 PM  Removal: Spontaneous  Disposition: Hospital disposal           Anesthesia    Method: Epidural  Cervical dilation at placement: 4-7                Presentation and Position    Presentation: Vertex     Occiput Anterior                 Ingris Armstrong Masters, DO

## 2021-09-16 NOTE — ANESTHESIA PROCEDURE NOTES
Epidural catheter Procedure Note  Pre-Procedure   Staff -        Anesthesiologist:  Luis Rogers MD       Performed By: anesthesiologist       Location: OB       Pre-Anesthestic Checklist: patient identified, IV checked, risks and benefits discussed, informed consent, monitors and equipment checked, pre-op evaluation and at physician/surgeon's request  Timeout:       Correct Patient: Yes        Correct Procedure: Yes        Correct Site: Yes        Correct Position: Yes   Procedure Documentation  Procedure: epidural catheter       Patient Position: sitting       Patient Prep/Sterile Barriers: sterile gloves, mask, patient draped       Skin prep: Betadine      Local skin infiltrated with mL of 1% lidocaine.        Insertion Site: L4-5. (midline approach).       Technique: LORT saline        PARISH at 5 cm.       Needle Type: SiriusDecisionsy needle       Needle Gauge: 17.        Needle Length (Inches): 3.5         Catheter threaded easily.         Threaded 10 cm at skin.        # of attempts: 1 and  # of redirects:     Assessment/Narrative         Paresthesias: No.      Test dose of 3 mL lidocaine 1.5% w/ 1:200,000 epinephrine at.         Test dose negative, 3 minutes after injection, for signs of intravascular, subdural, or intrathecal injection.       Insertion/Infusion Method: LORT saline       Aspiration negative for Heme or CSF via Epidural Catheter.    Medication(s) Administered   0.2% Ropivacaine (Epidural), 10 mL  Medication Administration Time: 9/16/2021 11:12 AM    Comments:  No complications.  Catheter secured with sterile dressing and tape.  Questions answered.    Orders to manage the epidural infusion have been entered and, through coordination with the nurse, we will continue to manage and monitor the patient's labor epidural.  We will continuously be available to adjust as needed throughout the entire labor and delivery process.

## 2021-09-16 NOTE — PROGRESS NOTES
"Labor Progress Note    S: Comfy with epidural    O:   Vitals:    21 1230 21 1245 21 1300 21 1315   BP: 113/65 96/48 96/54 100/55   Resp:  16     Temp:  97.9  F (36.6  C)     TempSrc:  Temporal     SpO2: 99% 99%  97%   Weight:       Height:         /55   Temp 97.9  F (36.6  C) (Temporal)   Resp 16   Ht 1.549 m (5' 1\")   Wt 76.2 kg (168 lb)   LMP 2020   SpO2 97%   BMI 31.74 kg/m      Gen: AOx3, NAD    SVE: 9cm per arn    FHT: 140 reactive  [place]: q 1-2      A/P: 29yo  @ 39+3wk elective IOL  -Reassuring fetal statue  -Anticipate . Will start pushing when complete      Ingris Armstrong Masters, DO  2021  1:42 PM    "

## 2021-09-16 NOTE — ANESTHESIA PREPROCEDURE EVALUATION
Anesthesia Pre-Procedure Evaluation    Patient: Dave Meyer   MRN: [id number] : 1990        Preoperative Diagnosis: * No surgery found *   Procedure :      Past Medical History:   Diagnosis Date     Acne     On spironolactone until 7/15     Anovular menstruation      Depression with anxiety 2017     Depressive disorder     General anxiety and depression      Hypothyroidism 2018     Infertility associated with anovulation 6/10/2016     PCOS (polycystic ovarian syndrome)      Recurrent pregnancy loss (CODE)       Past Surgical History:   Procedure Laterality Date     NO HISTORY OF SURGERY        Allergies   Allergen Reactions     Bee Venom Anaphylaxis      Social History     Tobacco Use     Smoking status: Never Smoker     Smokeless tobacco: Never Used   Substance Use Topics     Alcohol use: Not Currently     Comment: social      Wt Readings from Last 1 Encounters:   21 76.2 kg (168 lb)        Anesthesia Evaluation            ROS/MED HX  ENT/Pulmonary:       Neurologic:       Cardiovascular:       METS/Exercise Tolerance:     Hematologic:       Musculoskeletal:       GI/Hepatic:       Renal/Genitourinary:       Endo:     (+) thyroid problem, hypothyroidism,     Psychiatric/Substance Use:       Infectious Disease:       Malignancy:       Other:            Physical Exam    Airway        Mallampati: II       Respiratory Devices and Support         Dental           Cardiovascular   cardiovascular exam normal          Pulmonary   pulmonary exam normal                OUTSIDE LABS:  CBC:   Lab Results   Component Value Date    WBC 7.1 2021    WBC 9.7 2021    HGB 11.1 (L) 2021    HGB 9.5 (L) 2021    HCT 35.1 2021    HCT 30.6 (L) 2021     (L) 2021     2021     BMP:   Lab Results   Component Value Date     2021     2020    POTASSIUM 3.7 2021    POTASSIUM 3.4 2020    CHLORIDE 105 2021    CHLORIDE  104 01/19/2020    CO2 21 08/08/2021    CO2 26 01/19/2020    BUN 5 (L) 08/08/2021    BUN 18 01/19/2020    CR 0.58 08/08/2021    CR 0.65 01/19/2020     (H) 08/08/2021     (H) 01/19/2020     COAGS: No results found for: PTT, INR, FIBR  POC:   Lab Results   Component Value Date    HCG Negative 01/19/2020     HEPATIC:   Lab Results   Component Value Date    ALBUMIN 2.5 (L) 08/08/2021    PROTTOTAL 6.5 (L) 08/08/2021    ALT 27 08/08/2021    AST 18 08/08/2021    ALKPHOS 108 08/08/2021    BILITOTAL 0.6 08/08/2021     OTHER:   Lab Results   Component Value Date    LACT 1.6 08/08/2021    A1C 5.2 02/25/2021    DAVID 9.0 08/08/2021    LIPASE 89 08/08/2021    TSH 0.02 (L) 08/19/2021    T4 1.11 08/08/2021       Anesthesia Plan    ASA Status:  2      Anesthesia Type: Epidural.              Consents    Anesthesia Plan(s) and associated risks, benefits, and realistic alternatives discussed. Questions answered and patient/representative(s) expressed understanding.     - Discussed with:  Patient         Postoperative Care            Comments:                LICO HUNTLEY MD

## 2021-09-17 VITALS
BODY MASS INDEX: 31.72 KG/M2 | SYSTOLIC BLOOD PRESSURE: 111 MMHG | WEIGHT: 168 LBS | TEMPERATURE: 97.8 F | DIASTOLIC BLOOD PRESSURE: 68 MMHG | HEIGHT: 61 IN | HEART RATE: 64 BPM | RESPIRATION RATE: 16 BRPM | OXYGEN SATURATION: 100 %

## 2021-09-17 LAB — HGB BLD-MCNC: 10.8 G/DL (ref 11.7–15.7)

## 2021-09-17 PROCEDURE — 36416 COLLJ CAPILLARY BLOOD SPEC: CPT | Performed by: OBSTETRICS & GYNECOLOGY

## 2021-09-17 PROCEDURE — 250N000013 HC RX MED GY IP 250 OP 250 PS 637: Performed by: OBSTETRICS & GYNECOLOGY

## 2021-09-17 PROCEDURE — 85018 HEMOGLOBIN: CPT | Performed by: OBSTETRICS & GYNECOLOGY

## 2021-09-17 RX ADMIN — ACETAMINOPHEN 650 MG: 325 TABLET, FILM COATED ORAL at 03:22

## 2021-09-17 RX ADMIN — IBUPROFEN 800 MG: 400 TABLET, FILM COATED ORAL at 03:22

## 2021-09-17 RX ADMIN — IBUPROFEN 800 MG: 400 TABLET, FILM COATED ORAL at 15:13

## 2021-09-17 RX ADMIN — DOCUSATE SODIUM 100 MG: 100 CAPSULE, LIQUID FILLED ORAL at 07:27

## 2021-09-17 RX ADMIN — LEVOTHYROXINE SODIUM 50 MCG: 50 TABLET ORAL at 07:27

## 2021-09-17 RX ADMIN — IBUPROFEN 800 MG: 400 TABLET, FILM COATED ORAL at 09:01

## 2021-09-17 RX ADMIN — ACETAMINOPHEN 650 MG: 325 TABLET, FILM COATED ORAL at 09:01

## 2021-09-17 RX ADMIN — SERTRALINE HYDROCHLORIDE 50 MG: 50 TABLET ORAL at 07:27

## 2021-09-17 RX ADMIN — ACETAMINOPHEN 650 MG: 325 TABLET, FILM COATED ORAL at 15:13

## 2021-09-17 RX ADMIN — BUSPIRONE HYDROCHLORIDE 20 MG: 10 TABLET ORAL at 09:01

## 2021-09-17 NOTE — PLAN OF CARE
Vital signs stable. Postpartum assessment WDL. Pain controlled with tylenol and ibuprofen. Patient up ad lawanda and voiding without difficulty. Breastfeeding on cue with minimal staff assistance. Patient and infant bonding well. Will continue with current plan of care.

## 2021-09-17 NOTE — PLAN OF CARE
Patient doing well.  VVS.  Fundus firm at U/1. Scant rubra flow.  No clots or free flow noted.  Voiding without difficulty.  Wanting 24 hour discharge this evening.  Discharge instructions given and reviewed.  Discharge meds given as well and reviewed use.  All questions answered before discharge.

## 2021-09-17 NOTE — PLAN OF CARE
VSS, pt reports mild pain, comfortable with tylenol and ibuprofen. Using ice packs and tucks to perineum. Will offer benzocaine. Pt ambulated to  with SBA; tolerated well. Voiding adequately. Infant very sleepy; Dave holding infant skin to skin and attempting to nurse every 2-3 hours. [place] at bedside; supportive.

## 2021-09-17 NOTE — PROGRESS NOTES
Obstetrics Postpartum Rounding Note, PPD#1    S: Doing well. Pain controlled. Breast feeding. Minimal lochia. Would like discharge      O:   Vitals:    09/16/21 1730 09/16/21 1955 09/17/21 0305 09/17/21 0903   BP: 93/59 107/62 127/85 101/61   BP Location:    Right arm   Pulse:  75 62 65   Resp:  16 16 16   Temp:  98.6  F (37  C) 98.3  F (36.8  C) 97.6  F (36.4  C)   TempSrc:  Oral Oral Oral   SpO2:       Weight:       Height:           Gen: AOx3, NAD  Abd: soft, ND, non ttp, FF@ U-2.   Ext: no calf ttp, no  edema    Labs:         Hemoglobin   Date Value Ref Range Status   09/17/2021 10.8 (L) 11.7 - 15.7 g/dL Final   09/16/2021 11.1 (L) 11.7 - 15.7 g/dL Final   06/29/2021 9.3 (L) 11.7 - 15.7 g/dL Final   02/23/2021 12.3 11.7 - 15.7 g/dL Final            Lab Results   Component Value Date    RH Pos 02/23/2021       Meds:  Current Facility-Administered Medications   Medication    acetaminophen (TYLENOL) tablet 650 mg    benzocaine (AMERICAINE) 20 % topical spray    bisacodyl (DULCOLAX) Suppository 10 mg    busPIRone (BUSPAR) tablet 20 mg    carboprost (HEMABATE) injection 250 mcg    docusate sodium (COLACE) capsule 100 mg    ePHEDrine injection 5 mg    fentaNYL (SUBLIMAZE) 2 mcg/mL, bupivacaine (MARCAINE) 0.125% in NS premix for PCEA    hydrocortisone 2.5 % cream    ibuprofen (ADVIL/MOTRIN) tablet 800 mg    IF subcutaneous (SQ) Unfractionated heparin (UFH) ordered for thromboprophylaxis    Or    IF intravenous (IV) Unfractionated heparin (UFH) ordered    Or    IF LOW-dose Low molecular weight heparin (LMWH) thromboprophylaxis ordered    Or    IF HIGHER-dose Low molecular weight heparin (LMWH) thromboprophylaxis ordered    lactated ringers BOLUS 1,000 mL    Or    lactated ringers BOLUS 500 mL    lactated ringers BOLUS 250 mL    lanolin cream    levothyroxine (SYNTHROID/LEVOTHROID) tablet 50 mcg    medication instruction    methylergonovine (METHERGINE) injection 200 mcg    misoprostol (CYTOTEC) tablet 400 mcg    Or     misoprostol (CYTOTEC) tablet 800 mcg    nalbuphine (NUBAIN) injection 2.5-5 mg    naloxone (NARCAN) injection 0.2 mg    Or    naloxone (NARCAN) injection 0.4 mg    Or    naloxone (NARCAN) injection 0.2 mg    Or    naloxone (NARCAN) injection 0.4 mg    No MMR Needed - Assessment: Patient does not need MMR vaccine    No Tdap Needed - Assessment: Patient does not need Tdap vaccine    ondansetron (ZOFRAN-ODT) ODT tab 4 mg    Or    ondansetron (ZOFRAN) injection 4 mg    Opioid plan postpartum - medication instruction    oxyCODONE (ROXICODONE) tablet 5 mg    oxytocin (PITOCIN) 30 units in 500 mL 0.9% NaCl infusion    oxytocin (PITOCIN) injection 10 Units    ROPivacaine (NAROPIN) injection 10 mL    sertraline (ZOLOFT) tablet 50 mg    tranexamic acid 1 g in 100 mL 0.7% NaCl IV bag (premix)       A/P: PPD#1 s/p  doing well.  -Continue routine care.  -Anticipate discharge today. Precautions reveiwed. F/U 6wk    Ingris Armstrong Masters, DO  2021

## 2021-09-17 NOTE — PLAN OF CARE
Vital signs stable, and assessments WNL. Patient is up independently, voiding without difficulty, pain well controlled with medications given as prescribed. Encouraged to continue to ambulate as able and void frequently. Patient is bonding well with infant.

## 2021-09-17 NOTE — DISCHARGE INSTRUCTIONS
Postpartum Vaginal Delivery Instructions    Activity       Ask family and friends for help when you need it.    Do not place anything in your vagina for 6 weeks.    You are not restricted on other activities, but take it easy for a few weeks to allow your body to recover from delivery.  You are able to do any activities you feel up to that point.    No driving until you have stopped taking your pain medications (usually two weeks after delivery).     Call your health care provider if you have any of these symptoms:       Increased pain, swelling, redness, or fluid around your stiches from an episiotomy or perineal tear.    A fever above 100.4 F (38 C) with or without chills when placing a thermometer under your tongue.    You soak a sanitary pad with blood within 1 hour, or you see blood clots larger than a golf ball.    Bleeding that lasts more than 6 weeks.    Vaginal discharge that smells bad.    Severe pain, cramping or tenderness in your lower belly area.    A need to urinate more frequently (use the toilet more often), more urgently (use the toilet very quickly), or it burns when you urinate.    Nausea and vomiting.    Redness, swelling or pain around a vein in your leg.    Problems breastfeeding or a red or painful area on your breast.    Chest pain and cough or are gasping for air.    Problems coping with sadness, anxiety, or depression.  If you have any concerns about hurting yourself or the baby, call your provider immediately.     You have questions or concerns after you return home.     Keep your hands clean:  Always wash your hands before touching your perineal area and stitches.  This helps reduce your risk of infection.  If your hands aren't dirty, you may use an alcohol hand-rub to clean your hands. Keep your nails clean and short.    DISCHARGE INSTRUCTIONS    Medications:  -May take Ibuprofen (800mg by mouth every 8 hours as needed for pain) or tylenol (500mg by mouth every 6 hours as needed for  pain; max 4000mg per day) when at home as needed for pain/cramps/headaches/pain, follow instructions on bottle.  -You may use miralax (daily) or docusate (one tab by mouth twice daily) for stool softening, these are over the counter and can be found at any pharmacy. Follow bottle instructions.  -Continue prenatal vitamins.     Activity:  -Pelvic rest (no sex, tampons) for 6 weeks.   -General activity as tolerated, slowing increase daily. Avoid vigorous exercise, jumping or strength training for at least 4-6 weeks.  You may drive when you are able to safely operate a motor vehicle and are no longer taking narcotic medications if they have been prescribed for you.    Precuations:  -Call doctor if heavy unexpected bleeding, fever of 100.4 or greater, foul vaginal discharge, severe abdominal pain not helped with medications or for any other concerns or questions. If you are having headaches not resolved by tylenol or ibuprofen, new or different vision changes then notify your doctor.    Follow Up Visit:  -Call the [facility] to schedule your 6 week postpartum appointment, [phone].

## 2021-09-18 NOTE — ANESTHESIA POSTPROCEDURE EVALUATION
Patient: Dave Meyer    * No procedures listed *    Diagnosis:* No pre-op diagnosis entered *  Diagnosis Additional Information: No value filed.    Anesthesia Type:  Epidural    Note:  Disposition: Inpatient   Postop Pain Control: Uneventful            Sign Out: Well controlled pain   PONV:    Neuro/Psych: Uneventful            Sign Out: Acceptable/Baseline neuro status   Airway/Respiratory: Uneventful            Sign Out: Acceptable/Baseline resp. status   CV/Hemodynamics: Uneventful            Sign Out: Acceptable CV status   Other NRE: NONE   DID A NON-ROUTINE EVENT OCCUR?     Event details/Postop Comments:  Patient evaluated after epidural follow-up and specifically denies severe headache, severe lower back pain, saddle anesthesia, loss of bowel/bladder function or other major complications.  Ambulating as expected.             Last vitals:  Vitals Value Taken Time   BP     Temp     Pulse     Resp     SpO2         Electronically Signed By: Ld Gutierrez MD  September 18, 2021  7:18 AM

## 2021-11-27 ENCOUNTER — VIRTUAL VISIT (OUTPATIENT)
Dept: URGENT CARE | Facility: CLINIC | Age: 31
End: 2021-11-27
Payer: COMMERCIAL

## 2021-11-27 DIAGNOSIS — R11.10 VOMITING AND DIARRHEA: Primary | ICD-10-CM

## 2021-11-27 DIAGNOSIS — R19.7 VOMITING AND DIARRHEA: Primary | ICD-10-CM

## 2021-11-27 PROCEDURE — 99203 OFFICE O/P NEW LOW 30 MIN: CPT | Mod: 95 | Performed by: NURSE PRACTITIONER

## 2021-11-27 RX ORDER — ONDANSETRON 4 MG/1
4 TABLET, ORALLY DISINTEGRATING ORAL EVERY 8 HOURS PRN
Qty: 10 TABLET | Refills: 0 | Status: SHIPPED | OUTPATIENT
Start: 2021-11-27 | End: 2022-06-15

## 2021-11-27 NOTE — PROGRESS NOTES
SUBJECTIVE:  Chief Complaint   Patient presents with     Vomiting     Dave Meyer is a 31 year old female whose symptoms began stomach pains watery diarrhea started suddenly last night  Took imodium that helped. No mucous or blood.  Small amount of vomiting last night.   Temp 99.5  Having cramping feeling  No known exposure  No covid concern.   No severe abdominal pain no urinary pains.   Small sips of fluids. Still has nausea      Past Medical History:   Diagnosis Date     Acne     On spironolactone until 7/15     Anovular menstruation      Depression with anxiety 5/5/2017     Depressive disorder     General anxiety and depression      Hypothyroidism 1/22/2018     Infertility associated with anovulation 6/10/2016     PCOS (polycystic ovarian syndrome)      Recurrent pregnancy loss (CODE)    .  Current Outpatient Medications   Medication Sig Dispense Refill     acetaminophen (TYLENOL) 325 MG tablet Take 2 tablets (650 mg) by mouth every 4 hours as needed for mild pain, fever or headaches 90 tablet 0     busPIRone (BUSPAR) 10 MG tablet Take 20 mg by mouth 2 times daily        clindamycin (CLEOCIN T) 1 % external solution        docusate sodium (COLACE) 100 MG capsule Take 1 capsule (100 mg) by mouth 2 times daily as needed for constipation 60 capsule 0     EPINEPHrine (EPIPEN/ADRENACLICK/OR ANY BX GENERIC EQUIV) 0.3 MG/0.3ML injection 2-pack Inject 0.3 mLs (0.3 mg) into the muscle as needed for anaphylaxis 2 mL 3     ibuprofen (ADVIL/MOTRIN) 800 MG tablet Take 1 tablet (800 mg) by mouth every 8 hours as needed for moderate pain, fever or other (cramping) 90 tablet 0     levothyroxine (SYNTHROID/LEVOTHROID) 50 MCG tablet Take 1 tablet (50 mcg) by mouth daily 30 tablet 3     magnesium 100 MG TABS        Prenatal Vit-Fe Fumarate-FA (PRENATAL VITAMIN PO)        sertraline (ZOLOFT) 50 MG tablet        Social History     Tobacco Use     Smoking status: Never Smoker     Smokeless tobacco: Never Used   Substance Use  Topics     Alcohol use: Not Currently     Comment: social       ROS:  Review of systems negative except as stated above.    OBJECTIVE:  GENERAL APPEARANCE: alert and no distress  RESP: lungs clear  CV: regular rates and rhythm  ABDOMEN:  soft, nontender, no HSM or masses and bowel sounds normal  SKIN: no suspicious lesions or rashes    ASSESSMENT:  (R11.10,  R19.7) Vomiting and diarrhea  (primary encounter diagnosis)    Plan: ondansetron (ZOFRAN-ODT) 4 MG ODT tab to be used every 8 hours prn  She is not dehydrated, no severe abdominal pain or red flag symptoms. Discussed to ER if occur.    Diet: small amounts clear fluids frequently,soups,juices,water,advance diet as tolerated    Follow-up with PCP if not improving    Telephone time spent 12 minutes    FRIEDA Lynch CNP

## 2022-01-02 ENCOUNTER — APPOINTMENT (OUTPATIENT)
Dept: URGENT CARE | Facility: CLINIC | Age: 32
End: 2022-01-02
Payer: COMMERCIAL

## 2022-05-16 ENCOUNTER — HEALTH MAINTENANCE LETTER (OUTPATIENT)
Age: 32
End: 2022-05-16

## 2022-05-19 DIAGNOSIS — E03.9 ACQUIRED HYPOTHYROIDISM: ICD-10-CM

## 2022-05-19 RX ORDER — LEVOTHYROXINE SODIUM 50 UG/1
50 TABLET ORAL DAILY
Qty: 30 TABLET | Refills: 0 | Status: SHIPPED | OUTPATIENT
Start: 2022-05-19 | End: 2022-06-15

## 2022-05-19 RX ORDER — LEVOTHYROXINE SODIUM 75 UG/1
TABLET ORAL
Qty: 90 TABLET | Refills: 4 | OUTPATIENT
Start: 2022-05-19

## 2022-05-19 NOTE — TELEPHONE ENCOUNTER
"Requested Prescriptions   Pending Prescriptions Disp Refills     levothyroxine (SYNTHROID/LEVOTHROID) 50 MCG tablet 30 tablet 3     Sig: Take 1 tablet (50 mcg) by mouth daily       There is no refill protocol information for this order      Refused Prescriptions Disp Refills     levothyroxine (SYNTHROID/LEVOTHROID) 75 MCG tablet [Pharmacy Med Name: levothyroxine 75 mcg tablet] 90 tablet 4     Sig: TAKE ONE TABLET BY MOUTH EVERY DAY       Thyroid Protocol Failed - 5/19/2022 10:52 AM        Failed - Normal TSH on file in past 12 months     Recent Labs   Lab Test 08/19/21  1532   TSH 0.02*              Passed - Patient is 12 years or older        Passed - Recent (12 mo) or future (30 days) visit within the authorizing provider's specialty     Patient has had an office visit with the authorizing provider or a provider within the authorizing providers department within the previous 12 mos or has a future within next 30 days. See \"Patient Info\" tab in inbasket, or \"Choose Columns\" in Meds & Orders section of the refill encounter.              Passed - Medication is active on med list        Passed - No active pregnancy on record     If patient is pregnant or has had a positive pregnancy test, please check TSH.          Passed - No positive pregnancy test in past 12 months     If patient is pregnant or has had a positive pregnancy test, please check TSH.             Last Written Prescription Date:  8/20/21  Last Fill Quantity: 30,  # refills: 3   Last office visit: 2/8/2021 with prescribing provider:  Anali   Future Office Visit:   Next 5 appointments (look out 90 days)    May 23, 2022  3:30 PM  Office Visit with Ingris Damians, DO  [facility] ([facility] ) [address] [id number] [phone]         Medication is being filled for 1 time refill only due to:  Patient needs to be seen because it has been more than one " year since last visit.   Cristin Lindsey RN on 5/19/2022 at 11:06 AM

## 2022-05-20 NOTE — TELEPHONE ENCOUNTER
Prior Authorization Approval    Authorization Effective Date: 12/14/2020  Authorization Expiration Date: 7/12/2021  Medication: progesterone (ENDOMETRIN) 100 MG vaginal insert--APPROVED  Approved Dose/Quantity:   Reference #:     Insurance Company: [employer] - Phone [phone] Fax [phone]  Expected CoPay:       CoPay Card Available:      Foundation Assistance Needed:    Which Pharmacy is filling the prescription (Not needed for infusion/clinic administered): [facility] -- [facility] - [place], [place] - [address]  Pharmacy Notified: Yes  Patient Notified: Yes **Instructed pharmacy to notify patient when script is ready to /ship.**       Attending Attestation (For Attendings USE Only)...

## 2022-06-15 ENCOUNTER — OFFICE VISIT (OUTPATIENT)
Dept: OBGYN | Facility: CLINIC | Age: 32
End: 2022-06-15
Payer: COMMERCIAL

## 2022-06-15 VITALS — BODY MASS INDEX: 25.6 KG/M2 | SYSTOLIC BLOOD PRESSURE: 96 MMHG | DIASTOLIC BLOOD PRESSURE: 70 MMHG | WEIGHT: 135.5 LBS

## 2022-06-15 DIAGNOSIS — Z30.09 STERILIZATION CONSULT: ICD-10-CM

## 2022-06-15 DIAGNOSIS — E03.9 ACQUIRED HYPOTHYROIDISM: ICD-10-CM

## 2022-06-15 DIAGNOSIS — Z30.09 GENERAL COUNSELING AND ADVICE ON CONTRACEPTIVE MANAGEMENT: Primary | ICD-10-CM

## 2022-06-15 PROCEDURE — 84443 ASSAY THYROID STIM HORMONE: CPT | Performed by: OBSTETRICS & GYNECOLOGY

## 2022-06-15 PROCEDURE — 36415 COLL VENOUS BLD VENIPUNCTURE: CPT | Performed by: OBSTETRICS & GYNECOLOGY

## 2022-06-15 PROCEDURE — 99214 OFFICE O/P EST MOD 30 MIN: CPT | Performed by: OBSTETRICS & GYNECOLOGY

## 2022-06-15 RX ORDER — LEVOTHYROXINE SODIUM 50 UG/1
50 TABLET ORAL DAILY
Qty: 90 TABLET | Refills: 4 | Status: SHIPPED | OUTPATIENT
Start: 2022-06-15 | End: 2023-06-15

## 2022-06-15 RX ORDER — METHYLPHENIDATE HYDROCHLORIDE 10 MG/1
TABLET ORAL
COMMUNITY
Start: 2022-05-09

## 2022-06-15 RX ORDER — BUPROPION HYDROCHLORIDE 150 MG/1
150 TABLET ORAL DAILY
COMMUNITY
Start: 2022-06-02 | End: 2022-06-15

## 2022-06-15 RX ORDER — LEVOTHYROXINE SODIUM 75 UG/1
TABLET ORAL
COMMUNITY
Start: 2022-02-16 | End: 2022-06-15

## 2022-06-15 NOTE — PROGRESS NOTES
SUBJECTIVE:                                                   Dave Meyer is a 31 year old female who presents to clinic today for the following health issue(s):  Patient presents with:  Postpartum Care: Postpartum check/ annual since 2021, BC options    HPI:  Delivered 19. Did not come for her PP visit.   Wants to discuss long term contraception. Does not want more kids.     Periods are regular q 26-28 days. Periods are more painful post kids than in the past and more heavy on the 2-3rd day.   Does have some acne.     Has not had her thyroid checked since delivery.         No LMP recorded..     Patient is not sexually active, .  Using pills for contraception.    reports that she has never smoked. She has never used smokeless tobacco.    STD testing offered?  Declined    Health maintenance updated:  yes    Today's PHQ-2 Score:   PHQ-2 (  [employer]) 2021   Q1: Little interest or pleasure in doing things 1   Q2: Feeling down, depressed or hopeless 1   PHQ-2 Score 2   PHQ-2 Total Score (12-17 Years)- Positive if 3 or more points; Administer PHQ-A if positive 2   Q1: Little interest or pleasure in doing things -   Q2: Feeling down, depressed or hopeless -   PHQ-2 Score -     Today's PHQ-9 Score:   PHQ-9 SCORE 2021   PHQ-9 Total Score MyChart 6 (Mild depression)   PHQ-9 Total Score 6     Today's AGA-7 Score:   AGA-7 SCORE 2021   Total Score 6 (mild anxiety)   Total Score 6       Problem list and histories reviewed & adjusted, as indicated.  Additional history: as documented.    Patient Active Problem List   Diagnosis     PCOS (polycystic ovarian syndrome)     Infertility associated with anovulation     Depression with anxiety     Hypothyroidism     Indication for care in labor or delivery     Supervision of high risk pregnancy in third trimester     Vertigo     History of recurrent , currently pregnant in first trimester     Disorder of thyroid, antepartum     Major  depressive disorder in mother affecting pregnancy     Iron deficiency anemia during pregnancy      (spontaneous vaginal delivery)     Past Surgical History:   Procedure Laterality Date     NO HISTORY OF SURGERY        Social History     Tobacco Use     Smoking status: Never Smoker     Smokeless tobacco: Never Used   Substance Use Topics     Alcohol use: Not Currently     Comment: social      Problem (# of Occurrences) Relation (Name,Age of Onset)    Depression (3) Sister, Brother, Sister    Diabetes Type 2  (1) Mother    Hyperlipidemia (1) Father    Hypothyroidism (4) Mother, Sister, Maternal Grandmother, Maternal Aunt    Myocardial Infarction (1) Father            Current Outpatient Medications   Medication Sig     levothyroxine (SYNTHROID/LEVOTHROID) 50 MCG tablet Take 1 tablet (50 mcg) by mouth daily     methylphenidate (RITALIN) 10 MG tablet      No current facility-administered medications for this visit.     Allergies   Allergen Reactions     Bee Venom Anaphylaxis       ROS:  12 point review of systems negative other than symptoms noted below or in the HPI.        OBJECTIVE:     BP 96/70   Wt 61.5 kg (135 lb 8 oz)   BMI 25.60 kg/m    Body mass index is 25.6 kg/m .    Exam:  Constitutional:  Appearance: Well nourished, well developed alert, in no acute distress  Neurologic:  Mental Status:  Oriented X3.  Normal strength and tone, sensory exam grossly normal, mentation intact and speech normal.    Psychiatric:  Mentation appears normal and affect normal/bright.         ASSESSMENT/PLAN:                                                        ICD-10-CM    1. General counseling and advice on contraceptive management      2. Acquired hypothyroidism  E03.9 TSH with free T4 reflex     levothyroxine (SYNTHROID/LEVOTHROID) 50 MCG tablet     TSH with free T4 reflex   3. Sterilization consult            -Discussed long acting contraceptives IUDs. Is not a candidate for paragard due to  dysmenorrhea/heaviness of bleeding. Is hesitant about progestin IUD due to hx of IVF and medications used for this as well as acne.   Discussed L/S sterilizations as well. Also given information on vasectomy.   Brochures given on the above Pt will discuss with .  -Hx hypothyroidism, chronic. Will check TFTs. Meds refilled.   -F/U for annual exams.     Ingris Armstrong Masters, DO  [facility] [place]

## 2022-06-16 LAB — TSH SERPL DL<=0.005 MIU/L-ACNC: 1.21 MU/L (ref 0.4–4)

## 2022-09-11 ENCOUNTER — HEALTH MAINTENANCE LETTER (OUTPATIENT)
Age: 32
End: 2022-09-11

## 2023-02-08 NOTE — TELEPHONE ENCOUNTER
Left message on pt's voicemail that US was OK. Asked pt to call to schedule US. Included call back number.  US order placed.   
Routing to Dr. Briones to review and advise- ok for ultrasound for pt at this visit, has had u/s done at [facility]. Please see pt's mychart message below.   
Yes, of course.    Ingris Armstrong Masters, DO    
Admitted

## 2023-03-16 ENCOUNTER — HOSPITAL ENCOUNTER (EMERGENCY)
Facility: CLINIC | Age: 33
Discharge: HOME | End: 2023-03-16
Attending: EMERGENCY MEDICINE | Admitting: EMERGENCY MEDICINE
Payer: COMMERCIAL

## 2023-03-16 ENCOUNTER — PREP FOR PROCEDURE (OUTPATIENT)
Dept: SURGERY | Facility: CLINIC | Age: 33
End: 2023-03-16

## 2023-03-16 VITALS
BODY MASS INDEX: 24.55 KG/M2 | DIASTOLIC BLOOD PRESSURE: 84 MMHG | HEIGHT: 61 IN | RESPIRATION RATE: 18 BRPM | TEMPERATURE: 98.4 F | SYSTOLIC BLOOD PRESSURE: 115 MMHG | HEART RATE: 81 BPM | WEIGHT: 130 LBS | OXYGEN SATURATION: 98 %

## 2023-03-16 DIAGNOSIS — K80.20 CALCULUS OF GALLBLADDER WITHOUT CHOLECYSTITIS WITHOUT OBSTRUCTION: ICD-10-CM

## 2023-03-16 DIAGNOSIS — K80.50 BILIARY COLIC: ICD-10-CM

## 2023-03-16 DIAGNOSIS — K80.20 SYMPTOMATIC CHOLELITHIASIS: Primary | ICD-10-CM

## 2023-03-16 LAB
ABO/RH(D): NORMAL
ALBUMIN SERPL BCG-MCNC: 4.1 G/DL (ref 3.5–5.2)
ALP SERPL-CCNC: 62 U/L (ref 35–104)
ALT SERPL W P-5'-P-CCNC: 17 U/L (ref 10–35)
ANION GAP SERPL CALCULATED.3IONS-SCNC: 8 MMOL/L (ref 7–15)
ANTIBODY SCREEN: NEGATIVE
AST SERPL W P-5'-P-CCNC: 14 U/L (ref 10–35)
BASOPHILS # BLD AUTO: 0 10E3/UL (ref 0–0.2)
BASOPHILS NFR BLD AUTO: 0 %
BILIRUB SERPL-MCNC: 0.4 MG/DL
BUN SERPL-MCNC: 10.1 MG/DL (ref 6–20)
CALCIUM SERPL-MCNC: 9 MG/DL (ref 8.6–10)
CHLORIDE SERPL-SCNC: 102 MMOL/L (ref 98–107)
CREAT SERPL-MCNC: 1.07 MG/DL (ref 0.51–0.95)
DEPRECATED HCO3 PLAS-SCNC: 28 MMOL/L (ref 22–29)
EOSINOPHIL # BLD AUTO: 0.1 10E3/UL (ref 0–0.7)
EOSINOPHIL NFR BLD AUTO: 2 %
ERYTHROCYTE [DISTWIDTH] IN BLOOD BY AUTOMATED COUNT: 12.9 % (ref 10–15)
GFR SERPL CREATININE-BSD FRML MDRD: 70 ML/MIN/1.73M2
GLUCOSE SERPL-MCNC: 93 MG/DL (ref 70–99)
HCT VFR BLD AUTO: 41.6 % (ref 35–47)
HGB BLD-MCNC: 13.6 G/DL (ref 11.7–15.7)
IMM GRANULOCYTES # BLD: 0 10E3/UL
IMM GRANULOCYTES NFR BLD: 0 %
LIPASE SERPL-CCNC: 21 U/L (ref 13–60)
LYMPHOCYTES # BLD AUTO: 2.2 10E3/UL (ref 0.8–5.3)
LYMPHOCYTES NFR BLD AUTO: 42 %
MCH RBC QN AUTO: 30.2 PG (ref 26.5–33)
MCHC RBC AUTO-ENTMCNC: 32.7 G/DL (ref 31.5–36.5)
MCV RBC AUTO: 92 FL (ref 78–100)
MONOCYTES # BLD AUTO: 0.4 10E3/UL (ref 0–1.3)
MONOCYTES NFR BLD AUTO: 7 %
NEUTROPHILS # BLD AUTO: 2.5 10E3/UL (ref 1.6–8.3)
NEUTROPHILS NFR BLD AUTO: 49 %
NRBC # BLD AUTO: 0 10E3/UL
NRBC BLD AUTO-RTO: 0 /100
PLATELET # BLD AUTO: 191 10E3/UL (ref 150–450)
POTASSIUM SERPL-SCNC: 4.5 MMOL/L (ref 3.4–5.3)
PROT SERPL-MCNC: 6.7 G/DL (ref 6.4–8.3)
RBC # BLD AUTO: 4.5 10E6/UL (ref 3.8–5.2)
SODIUM SERPL-SCNC: 138 MMOL/L (ref 136–145)
SPECIMEN EXPIRATION DATE: NORMAL
WBC # BLD AUTO: 5.2 10E3/UL (ref 4–11)

## 2023-03-16 PROCEDURE — 36415 COLL VENOUS BLD VENIPUNCTURE: CPT | Performed by: EMERGENCY MEDICINE

## 2023-03-16 PROCEDURE — 85025 COMPLETE CBC W/AUTO DIFF WBC: CPT | Performed by: EMERGENCY MEDICINE

## 2023-03-16 PROCEDURE — 99204 OFFICE O/P NEW MOD 45 MIN: CPT | Mod: 57 | Performed by: SURGERY

## 2023-03-16 PROCEDURE — 250N000011 HC RX IP 250 OP 636: Performed by: EMERGENCY MEDICINE

## 2023-03-16 PROCEDURE — 80053 COMPREHEN METABOLIC PANEL: CPT | Performed by: EMERGENCY MEDICINE

## 2023-03-16 PROCEDURE — 86901 BLOOD TYPING SEROLOGIC RH(D): CPT | Performed by: EMERGENCY MEDICINE

## 2023-03-16 PROCEDURE — 96374 THER/PROPH/DIAG INJ IV PUSH: CPT

## 2023-03-16 PROCEDURE — 83690 ASSAY OF LIPASE: CPT | Performed by: EMERGENCY MEDICINE

## 2023-03-16 PROCEDURE — 99285 EMERGENCY DEPT VISIT HI MDM: CPT | Mod: 25

## 2023-03-16 PROCEDURE — 86850 RBC ANTIBODY SCREEN: CPT | Performed by: EMERGENCY MEDICINE

## 2023-03-16 RX ORDER — MORPHINE SULFATE 4 MG/ML
4 INJECTION, SOLUTION INTRAMUSCULAR; INTRAVENOUS ONCE
Status: COMPLETED | OUTPATIENT
Start: 2023-03-16 | End: 2023-03-16

## 2023-03-16 RX ORDER — HYDROCODONE BITARTRATE AND ACETAMINOPHEN 5; 325 MG/1; MG/1
1 TABLET ORAL EVERY 6 HOURS PRN
Qty: 6 TABLET | Refills: 0 | Status: SHIPPED | OUTPATIENT
Start: 2023-03-16 | End: 2023-03-19

## 2023-03-16 RX ADMIN — MORPHINE SULFATE 4 MG: 4 INJECTION, SOLUTION INTRAMUSCULAR; INTRAVENOUS at 12:35

## 2023-03-16 ASSESSMENT — ENCOUNTER SYMPTOMS
NAUSEA: 1
ABDOMINAL PAIN: 1
COUGH: 0
APPETITE CHANGE: 1
SHORTNESS OF BREATH: 0
DIARRHEA: 0
VOMITING: 1
FEVER: 1

## 2023-03-16 NOTE — ED TRIAGE NOTES
Pt was scheduled for a maria d today at 1400 at [place] but when got there, insurance said they would cover the surgery and the dr but not the facility so sent here for surgery. Pt is NPO since midnight.      Triage Assessment     Row Name 03/16/23 1227       Respiratory WDL    Respiratory WDL WDL       Cardiac WDL    Cardiac WDL WDL       Cognitive/Neuro/Behavioral WDL    Cognitive/Neuro/Behavioral WDL WDL

## 2023-03-16 NOTE — ED PROVIDER NOTES
"  History     Chief Complaint:  Cholecystitis     HPI   Dave Meyer is a 32 year old female with a history of known cholelithiasis who presents with abdominal pain. Dave states that she's been dealing with abdominal pain for a few days and was evaluated at another ER on 3/14 where she was discharged with a plan for a cholecystectomy today. However, Dave states that she got a call from her insurance this morning that the surgeon and group were in network for her insurance, but the location was not. During evaluation, Dave states that she is still experiencing some decreased appetite, nausea, and continued abdominal pain. She did have a fever on 3/14 and also had some emesis, but these have both resolved. Dave otherwise denies any cough, shortness of breath, chest pain, or diarrhea. She notes that she last eat yesterday evening and she hasn't had anything to drink this morning. Of note, Dave states that she has experienced pain like this before and would get a sensation of \"fullness\" about once per month, but she didn't go in to the ED until the most recent occurrence.     US Abdomen Upper results from 3/14/23  1.  Cholelithiasis without sonographic evidence of cholecystitis.   2.  Otherwise, normal sonographic evaluation of the abdomen.     Independent Historian:   None - Patient Only    Review of External Notes: I reviewed the patient's note from 3/14/23 with Dr. Hurst.    ROS:  Review of Systems   Constitutional: Positive for appetite change (decreased) and fever (resolved).   Respiratory: Negative for cough and shortness of breath.    Cardiovascular: Negative for chest pain.   Gastrointestinal: Positive for abdominal pain, nausea and vomiting (resolved). Negative for diarrhea.   All other systems reviewed and are negative.      Allergies:  No known drug allergies     Medications:    Buspirone  Levothyroxine  Duloxetine  Oxycodone  Zofran  Clonazepam  Macrobid  Sertraline  Methylphenidate    Past " "Medical History:    Depression  Hypothyroidism  Varicella  PCOS  Anxiety  Anovular menstruation  Vertigo    Family History:    Diabetes - mother  Thyroid disorder - mother, sister  Depression - sisters, brother  Heart disease - father  Hypercholesterolemia - father  MI - father    Social History:  Patient is accompanied in the ED by her mother.  PCP: Ingris Briones     Physical Exam     Patient Vitals for the past 24 hrs:   BP Temp Temp src Pulse Resp SpO2 Height Weight   03/16/23 1226 115/84 98.4  F (36.9  C) Temporal 81 18 98 % 1.549 m (5' 1\") 59 kg (130 lb)        Physical Exam  General:  Sitting on bed.  HENT:  No obvious trauma to head  Right Ear:  External ear normal.   Left Ear:  External ear normal.   Nose:  Nose normal.   Eyes:  Conjunctivae and EOM are normal. Pupils are equal, round, and reactive.   Neck: Normal range of motion. Neck supple. No tracheal deviation present.   CV:  Normal heart sounds. No murmur heard.  Pulm/Chest: Effort normal and breath sounds normal.   Abd: Soft. No distension. There is RUQ tenderness. There is no rigidity, no rebound and no guarding.   M/S: Normal range of motion.   Neuro: Awake and alert.   Skin: Skin is warm and dry. No rash noted. Not diaphoretic.   Psych: Normal mood and affect. Behavior is normal.       Emergency Department Course     Laboratory:  Labs Ordered and Resulted from Time of ED Arrival to Time of ED Departure   COMPREHENSIVE METABOLIC PANEL - Abnormal       Result Value    Sodium 138      Potassium 4.5      Chloride 102      Carbon Dioxide (CO2) 28      Anion Gap 8      Urea Nitrogen 10.1      Creatinine 1.07 (*)     Calcium 9.0      Glucose 93      Alkaline Phosphatase 62      AST 14      ALT 17      Protein Total 6.7      Albumin 4.1      Bilirubin Total 0.4      GFR Estimate 70     LIPASE - Normal    Lipase 21     CBC WITH PLATELETS AND DIFFERENTIAL    WBC Count 5.2      RBC Count 4.50      Hemoglobin 13.6      Hematocrit 41.6      MCV 92      " MCH 30.2      MCHC 32.7      RDW 12.9      Platelet Count 191      % Neutrophils 49      % Lymphocytes 42      % Monocytes 7      % Eosinophils 2      % Basophils 0      % Immature Granulocytes 0      NRBCs per 100 WBC 0      Absolute Neutrophils 2.5      Absolute Lymphocytes 2.2      Absolute Monocytes 0.4      Absolute Eosinophils 0.1      Absolute Basophils 0.0      Absolute Immature Granulocytes 0.0      Absolute NRBCs 0.0     TYPE AND SCREEN, ADULT    ABO/RH(D) A POS      Antibody Screen Negative      SPECIMEN EXPIRATION DATE [id number]          Procedures   none    Emergency Department Course & Assessments:       Interventions:  Medications   morphine (PF) injection 4 mg (4 mg Intravenous $Given 3/16/23 1235)        Assessments:  1228 I obtained history and examined the patient as noted above.   1320 I rechecked the patient after Dr. Boles's assessment. At this time, the patient was deemed safe to discharge home with and she agreed to the plan of care.    Independent Interpretation (X-rays, CTs, rhythm strip):  None    Consultations/Discussion of Management or Tests:  1239 I spoke with Dr. Boles with general surgery about the patient's history and plan of care. He agreed to see the patient and will arrange to perform surgery on her tomorrow.     Social Determinants of Health affecting care:   None    Disposition:  The patient was discharged to home.     Impression & Plan    CMS Diagnoses: None      Medical Decision Making:  Dave Meyer is a very pleasant 32 year old female who presents for evaluation of abdominal pain in the right lower quadrant.  This patient has symptoms consistent with gallstones and biliary colic, and was confirmed by prior ER visit on 3/14/22.  There is no clinical or laboratory evidence of choledocholithiasis, gallstone pancreatitis, or ascending cholangitis.  She continues to be afebrile, no leukocytosis, liver enzymes normal and I do not believe a repeat ultrasound is  indicated.  I doubt perforated ulcer, diverticulitis, colitis.  Certainly also may have component of GERD or gastritis. The patient will be prescribed analgesics. The patient was educated to avoid fatty foods.  The patient had plan to have surgery today at [facility], but found out it was not in network so came here.  The general surgeon is able to come down and evaluate the patient at bedside.  The patient was set up for a cholecystectomy tomorrow with Dr. Boles with general surgery and will discharge home today.  Medications as below prescribed.    Diagnosis:    ICD-10-CM    1. Calculus of gallbladder without cholecystitis without obstruction  K80.20       2. Biliary colic  K80.50            Discharge Medications:  Discharge Medication List as of 3/16/2023  1:32 PM      START taking these medications    Details   HYDROcodone-acetaminophen (NORCO) 5-325 MG tablet Take 1 tablet by mouth every 6 hours as needed for severe pain (7-10), Disp-6 tablet, R-0, E-Prescribe                Scribe Disclosure:  I, Karolina Cook, am serving as a scribe at 12:41 PM on 3/16/2023 to document services personally performed by Estuardo Lazaro DO based on my observations and the provider's statements to me.     3/16/2023   Estuardo Lazaro DO Anderson, Robert James, DO  03/16/23 5481

## 2023-03-16 NOTE — H&P (VIEW-ONLY)
"HPI:  Dave Meyer is a 32 year old female with a history of known cholelithiasis who presents with abdominal pain. Dave states that she's been dealing with abdominal pain for a few days and was evaluated at another ER on 3/14 where she was discharged with a plan for a cholecystectomy today. However, Dave states that she got a call from her insurance this morning that the surgeon and group were in network for her insurance, but the location was not. During evaluation, Dave states that she is still experiencing some decreased appetite, nausea, and continued abdominal pain. She did have a fever on 3/14 and also had some emesis, but these have both resolved. Dave otherwise denies any cough, shortness of breath, chest pain, or diarrhea. She notes that she last eat yesterday evening and she hasn't had anything to drink this morning. Of note, Dave states that she has experienced pain like this before and would get a sensation of \"fullness\" about once per month, but she didn't go in to the ED until the most recent occurrence.     Past Medical History:   Diagnosis Date     Acne     On spironolactone until 7/15     Anovular menstruation      Depression with anxiety 5/5/2017     Depressive disorder     General anxiety and depression      Hypothyroidism 1/22/2018     Infertility associated with anovulation 6/10/2016     PCOS (polycystic ovarian syndrome)      Recurrent pregnancy loss (CODE)        Allergies:   Allergies   Allergen Reactions     Bee Venom Anaphylaxis       Active Problems:    * No active hospital problems. *    unknown if currently breastfeeding.    ROS: 10 point ROS neg other than the symptoms noted above in the     Healthy female in mild distress.  Patient has a pleasant affect and communicates well.   Pupils equal round and reactive to light.   No cervical lymphadenopathy or thyromegaly.   Lung fields clear, breathing comfortably.   Heart normal sinus rhythm.  No murmurs rubs or gallops.  Abdomen " soft, tender in the RUQ.  Skin warm, dry.  No obvious rashes or lesions.      Assessment:  Pleasant, 33 yo female with symptomatic cholelithiasis.  Will schedule for ida killian tomorrow as an outpatient.    Plan:  Ida killian tomorrow AM.    Antoine Boles MD, MD  3/16/2023

## 2023-03-17 ENCOUNTER — ANESTHESIA (OUTPATIENT)
Dept: SURGERY | Facility: CLINIC | Age: 33
End: 2023-03-17
Payer: COMMERCIAL

## 2023-03-17 ENCOUNTER — ANESTHESIA EVENT (OUTPATIENT)
Dept: SURGERY | Facility: CLINIC | Age: 33
End: 2023-03-17
Payer: COMMERCIAL

## 2023-03-17 ENCOUNTER — HOSPITAL ENCOUNTER (OUTPATIENT)
Facility: CLINIC | Age: 33
Discharge: HOME | End: 2023-03-17
Attending: SURGERY | Admitting: SURGERY
Payer: COMMERCIAL

## 2023-03-17 VITALS
HEIGHT: 61 IN | BODY MASS INDEX: 24.55 KG/M2 | OXYGEN SATURATION: 100 % | TEMPERATURE: 97 F | DIASTOLIC BLOOD PRESSURE: 84 MMHG | RESPIRATION RATE: 16 BRPM | SYSTOLIC BLOOD PRESSURE: 111 MMHG | HEART RATE: 74 BPM | WEIGHT: 130 LBS

## 2023-03-17 DIAGNOSIS — K80.20 CALCULUS OF GALLBLADDER WITHOUT CHOLECYSTITIS WITHOUT OBSTRUCTION: Primary | ICD-10-CM

## 2023-03-17 LAB — HCG UR QL: NEGATIVE

## 2023-03-17 PROCEDURE — 250N000009 HC RX 250: Performed by: ANESTHESIOLOGY

## 2023-03-17 PROCEDURE — 88304 TISSUE EXAM BY PATHOLOGIST: CPT | Mod: TC | Performed by: SURGERY

## 2023-03-17 PROCEDURE — 272N000001 HC OR GENERAL SUPPLY STERILE: Performed by: SURGERY

## 2023-03-17 PROCEDURE — 250N000011 HC RX IP 250 OP 636: Performed by: NURSE ANESTHETIST, CERTIFIED REGISTERED

## 2023-03-17 PROCEDURE — 250N000009 HC RX 250: Performed by: NURSE ANESTHETIST, CERTIFIED REGISTERED

## 2023-03-17 PROCEDURE — 88304 TISSUE EXAM BY PATHOLOGIST: CPT | Mod: 26 | Performed by: PATHOLOGY

## 2023-03-17 PROCEDURE — 81025 URINE PREGNANCY TEST: CPT | Performed by: ANESTHESIOLOGY

## 2023-03-17 PROCEDURE — 258N000003 HC RX IP 258 OP 636: Performed by: ANESTHESIOLOGY

## 2023-03-17 PROCEDURE — 250N000011 HC RX IP 250 OP 636: Performed by: ANESTHESIOLOGY

## 2023-03-17 PROCEDURE — 999N000141 HC STATISTIC PRE-PROCEDURE NURSING ASSESSMENT: Performed by: SURGERY

## 2023-03-17 PROCEDURE — 258N000001 HC RX 258: Performed by: SURGERY

## 2023-03-17 PROCEDURE — 258N000003 HC RX IP 258 OP 636: Performed by: NURSE ANESTHETIST, CERTIFIED REGISTERED

## 2023-03-17 PROCEDURE — 250N000013 HC RX MED GY IP 250 OP 250 PS 637: Performed by: STUDENT IN AN ORGANIZED HEALTH CARE EDUCATION/TRAINING PROGRAM

## 2023-03-17 PROCEDURE — 250N000025 HC SEVOFLURANE, PER MIN: Performed by: SURGERY

## 2023-03-17 PROCEDURE — 250N000009 HC RX 250: Performed by: SURGERY

## 2023-03-17 PROCEDURE — 710N000009 HC RECOVERY PHASE 1, LEVEL 1, PER MIN: Performed by: SURGERY

## 2023-03-17 PROCEDURE — 47562 LAPAROSCOPIC CHOLECYSTECTOMY: CPT | Performed by: SURGERY

## 2023-03-17 PROCEDURE — 360N000076 HC SURGERY LEVEL 3, PER MIN: Performed by: SURGERY

## 2023-03-17 PROCEDURE — 370N000017 HC ANESTHESIA TECHNICAL FEE, PER MIN: Performed by: SURGERY

## 2023-03-17 PROCEDURE — 250N000011 HC RX IP 250 OP 636: Performed by: SURGERY

## 2023-03-17 PROCEDURE — 710N000012 HC RECOVERY PHASE 2, PER MINUTE: Performed by: SURGERY

## 2023-03-17 RX ORDER — BUSPIRONE HYDROCHLORIDE 30 MG/1
TABLET ORAL
COMMUNITY
Start: 2023-03-01

## 2023-03-17 RX ORDER — SODIUM CHLORIDE, SODIUM LACTATE, POTASSIUM CHLORIDE, CALCIUM CHLORIDE 600; 310; 30; 20 MG/100ML; MG/100ML; MG/100ML; MG/100ML
INJECTION, SOLUTION INTRAVENOUS CONTINUOUS
Status: DISCONTINUED | OUTPATIENT
Start: 2023-03-17 | End: 2023-03-17 | Stop reason: HOSPADM

## 2023-03-17 RX ORDER — BUPIVACAINE HYDROCHLORIDE AND EPINEPHRINE 5; 5 MG/ML; UG/ML
INJECTION, SOLUTION PERINEURAL PRN
Status: DISCONTINUED | OUTPATIENT
Start: 2023-03-17 | End: 2023-03-17 | Stop reason: HOSPADM

## 2023-03-17 RX ORDER — CLONAZEPAM 1 MG/1
TABLET ORAL
COMMUNITY
Start: 2023-03-14

## 2023-03-17 RX ORDER — SODIUM CHLORIDE, SODIUM LACTATE, POTASSIUM CHLORIDE, CALCIUM CHLORIDE 600; 310; 30; 20 MG/100ML; MG/100ML; MG/100ML; MG/100ML
INJECTION, SOLUTION INTRAVENOUS CONTINUOUS PRN
Status: DISCONTINUED | OUTPATIENT
Start: 2023-03-17 | End: 2023-03-17

## 2023-03-17 RX ORDER — MAGNESIUM HYDROXIDE 1200 MG/15ML
LIQUID ORAL PRN
Status: DISCONTINUED | OUTPATIENT
Start: 2023-03-17 | End: 2023-03-17 | Stop reason: HOSPADM

## 2023-03-17 RX ORDER — FENTANYL CITRATE 50 UG/ML
INJECTION, SOLUTION INTRAMUSCULAR; INTRAVENOUS PRN
Status: DISCONTINUED | OUTPATIENT
Start: 2023-03-17 | End: 2023-03-17

## 2023-03-17 RX ORDER — ONDANSETRON 4 MG/1
4 TABLET, ORALLY DISINTEGRATING ORAL EVERY 30 MIN PRN
Status: DISCONTINUED | OUTPATIENT
Start: 2023-03-17 | End: 2023-03-17 | Stop reason: HOSPADM

## 2023-03-17 RX ORDER — HYDROMORPHONE HCL IN WATER/PF 6 MG/30 ML
0.2 PATIENT CONTROLLED ANALGESIA SYRINGE INTRAVENOUS EVERY 5 MIN PRN
Status: DISCONTINUED | OUTPATIENT
Start: 2023-03-17 | End: 2023-03-17 | Stop reason: HOSPADM

## 2023-03-17 RX ORDER — GLYCOPYRROLATE 0.2 MG/ML
INJECTION, SOLUTION INTRAMUSCULAR; INTRAVENOUS PRN
Status: DISCONTINUED | OUTPATIENT
Start: 2023-03-17 | End: 2023-03-17

## 2023-03-17 RX ORDER — HYDROXYZINE HYDROCHLORIDE 50 MG/ML
25 INJECTION, SOLUTION INTRAMUSCULAR ONCE
Status: COMPLETED | OUTPATIENT
Start: 2023-03-17 | End: 2023-03-17

## 2023-03-17 RX ORDER — CEPHALEXIN 500 MG/1
CAPSULE ORAL
COMMUNITY
Start: 2022-12-31

## 2023-03-17 RX ORDER — BUPROPION HYDROCHLORIDE 150 MG/1
TABLET ORAL
COMMUNITY
Start: 2023-03-01

## 2023-03-17 RX ORDER — NEOSTIGMINE METHYLSULFATE 1 MG/ML
VIAL (ML) INJECTION PRN
Status: DISCONTINUED | OUTPATIENT
Start: 2023-03-17 | End: 2023-03-17

## 2023-03-17 RX ORDER — ONDANSETRON 4 MG/1
4 TABLET, ORALLY DISINTEGRATING ORAL
Status: DISCONTINUED | OUTPATIENT
Start: 2023-03-17 | End: 2023-03-17 | Stop reason: HOSPADM

## 2023-03-17 RX ORDER — FENTANYL CITRATE 0.05 MG/ML
25 INJECTION, SOLUTION INTRAMUSCULAR; INTRAVENOUS EVERY 5 MIN PRN
Status: DISCONTINUED | OUTPATIENT
Start: 2023-03-17 | End: 2023-03-17 | Stop reason: HOSPADM

## 2023-03-17 RX ORDER — CEFAZOLIN SODIUM/WATER 2 G/20 ML
2 SYRINGE (ML) INTRAVENOUS
Status: COMPLETED | OUTPATIENT
Start: 2023-03-17 | End: 2023-03-17

## 2023-03-17 RX ORDER — DEXAMETHASONE SODIUM PHOSPHATE 4 MG/ML
INJECTION, SOLUTION INTRA-ARTICULAR; INTRALESIONAL; INTRAMUSCULAR; INTRAVENOUS; SOFT TISSUE PRN
Status: DISCONTINUED | OUTPATIENT
Start: 2023-03-17 | End: 2023-03-17

## 2023-03-17 RX ORDER — OXYCODONE HYDROCHLORIDE 5 MG/1
5 TABLET ORAL ONCE
Status: COMPLETED | OUTPATIENT
Start: 2023-03-17 | End: 2023-03-17

## 2023-03-17 RX ORDER — LIDOCAINE HYDROCHLORIDE 20 MG/ML
INJECTION, SOLUTION INFILTRATION; PERINEURAL PRN
Status: DISCONTINUED | OUTPATIENT
Start: 2023-03-17 | End: 2023-03-17

## 2023-03-17 RX ORDER — KETOROLAC TROMETHAMINE 30 MG/ML
INJECTION, SOLUTION INTRAMUSCULAR; INTRAVENOUS PRN
Status: DISCONTINUED | OUTPATIENT
Start: 2023-03-17 | End: 2023-03-17

## 2023-03-17 RX ORDER — ACETAMINOPHEN 325 MG/1
650 TABLET ORAL
Status: DISCONTINUED | OUTPATIENT
Start: 2023-03-17 | End: 2023-03-17 | Stop reason: HOSPADM

## 2023-03-17 RX ORDER — BUPIVACAINE HYDROCHLORIDE AND EPINEPHRINE 5; 5 MG/ML; UG/ML
INJECTION, SOLUTION EPIDURAL; INTRACAUDAL; PERINEURAL
Status: DISCONTINUED
Start: 2023-03-17 | End: 2023-03-17 | Stop reason: HOSPADM

## 2023-03-17 RX ORDER — AMOXICILLIN 250 MG
1-2 CAPSULE ORAL 2 TIMES DAILY
Qty: 30 TABLET | Refills: 0 | Status: SHIPPED | OUTPATIENT
Start: 2023-03-17

## 2023-03-17 RX ORDER — DEXMEDETOMIDINE HYDROCHLORIDE 4 UG/ML
INJECTION, SOLUTION INTRAVENOUS PRN
Status: DISCONTINUED | OUTPATIENT
Start: 2023-03-17 | End: 2023-03-17

## 2023-03-17 RX ORDER — DULOXETIN HYDROCHLORIDE 20 MG/1
CAPSULE, DELAYED RELEASE ORAL
COMMUNITY
Start: 2023-03-01

## 2023-03-17 RX ORDER — PROPOFOL 10 MG/ML
INJECTION, EMULSION INTRAVENOUS CONTINUOUS PRN
Status: DISCONTINUED | OUTPATIENT
Start: 2023-03-17 | End: 2023-03-17

## 2023-03-17 RX ORDER — ONDANSETRON 2 MG/ML
4 INJECTION INTRAMUSCULAR; INTRAVENOUS EVERY 30 MIN PRN
Status: DISCONTINUED | OUTPATIENT
Start: 2023-03-17 | End: 2023-03-17 | Stop reason: HOSPADM

## 2023-03-17 RX ORDER — METHOCARBAMOL 750 MG/1
750 TABLET, FILM COATED ORAL ONCE
Status: COMPLETED | OUTPATIENT
Start: 2023-03-17 | End: 2023-03-17

## 2023-03-17 RX ORDER — HYDROMORPHONE HCL IN WATER/PF 6 MG/30 ML
0.4 PATIENT CONTROLLED ANALGESIA SYRINGE INTRAVENOUS EVERY 5 MIN PRN
Status: DISCONTINUED | OUTPATIENT
Start: 2023-03-17 | End: 2023-03-17 | Stop reason: HOSPADM

## 2023-03-17 RX ORDER — OXYCODONE HYDROCHLORIDE 5 MG/1
5-10 TABLET ORAL EVERY 4 HOURS PRN
Qty: 10 TABLET | Refills: 0 | Status: SHIPPED | OUTPATIENT
Start: 2023-03-17

## 2023-03-17 RX ORDER — ACETAMINOPHEN 325 MG/1
650 TABLET ORAL EVERY 4 HOURS PRN
Qty: 50 TABLET | Refills: 0 | Status: SHIPPED | OUTPATIENT
Start: 2023-03-17

## 2023-03-17 RX ORDER — PROPOFOL 10 MG/ML
INJECTION, EMULSION INTRAVENOUS PRN
Status: DISCONTINUED | OUTPATIENT
Start: 2023-03-17 | End: 2023-03-17

## 2023-03-17 RX ORDER — OXYCODONE HYDROCHLORIDE 5 MG/1
5 TABLET ORAL
Status: COMPLETED | OUTPATIENT
Start: 2023-03-17 | End: 2023-03-17

## 2023-03-17 RX ORDER — CEFAZOLIN SODIUM/WATER 2 G/20 ML
2 SYRINGE (ML) INTRAVENOUS SEE ADMIN INSTRUCTIONS
Status: DISCONTINUED | OUTPATIENT
Start: 2023-03-17 | End: 2023-03-17 | Stop reason: HOSPADM

## 2023-03-17 RX ORDER — METHOCARBAMOL 750 MG/1
750 TABLET, FILM COATED ORAL 4 TIMES DAILY PRN
Qty: 12 TABLET | Refills: 0 | Status: SHIPPED | OUTPATIENT
Start: 2023-03-17 | End: 2023-03-20

## 2023-03-17 RX ORDER — ONDANSETRON 2 MG/ML
INJECTION INTRAMUSCULAR; INTRAVENOUS PRN
Status: DISCONTINUED | OUTPATIENT
Start: 2023-03-17 | End: 2023-03-17

## 2023-03-17 RX ORDER — SIMETHICONE 80 MG
80 TABLET,CHEWABLE ORAL EVERY 6 HOURS PRN
Status: DISCONTINUED | OUTPATIENT
Start: 2023-03-17 | End: 2023-03-17 | Stop reason: HOSPADM

## 2023-03-17 RX ORDER — FENTANYL CITRATE 0.05 MG/ML
50 INJECTION, SOLUTION INTRAMUSCULAR; INTRAVENOUS EVERY 5 MIN PRN
Status: DISCONTINUED | OUTPATIENT
Start: 2023-03-17 | End: 2023-03-17 | Stop reason: HOSPADM

## 2023-03-17 RX ADMIN — OXYCODONE HYDROCHLORIDE 5 MG: 5 TABLET ORAL at 10:42

## 2023-03-17 RX ADMIN — SIMETHICONE 80 MG: 80 TABLET, CHEWABLE ORAL at 14:19

## 2023-03-17 RX ADMIN — HYDROMORPHONE HYDROCHLORIDE 0.2 MG: 0.2 INJECTION, SOLUTION INTRAMUSCULAR; INTRAVENOUS; SUBCUTANEOUS at 11:11

## 2023-03-17 RX ADMIN — DEXAMETHASONE SODIUM PHOSPHATE 4 MG: 4 INJECTION, SOLUTION INTRA-ARTICULAR; INTRALESIONAL; INTRAMUSCULAR; INTRAVENOUS; SOFT TISSUE at 08:55

## 2023-03-17 RX ADMIN — HYDROMORPHONE HYDROCHLORIDE 0.4 MG: 0.2 INJECTION, SOLUTION INTRAMUSCULAR; INTRAVENOUS; SUBCUTANEOUS at 10:47

## 2023-03-17 RX ADMIN — KETOROLAC TROMETHAMINE 30 MG: 30 INJECTION, SOLUTION INTRAMUSCULAR at 09:33

## 2023-03-17 RX ADMIN — SUCCINYLCHOLINE CHLORIDE 100 MG: 20 INJECTION, SOLUTION INTRAMUSCULAR; INTRAVENOUS; PARENTERAL at 08:47

## 2023-03-17 RX ADMIN — MIDAZOLAM 2 MG: 1 INJECTION INTRAMUSCULAR; INTRAVENOUS at 08:42

## 2023-03-17 RX ADMIN — DEXMEDETOMIDINE HYDROCHLORIDE 12 MCG: 200 INJECTION INTRAVENOUS at 09:09

## 2023-03-17 RX ADMIN — METHOCARBAMOL 750 MG: 750 TABLET ORAL at 13:40

## 2023-03-17 RX ADMIN — FENTANYL CITRATE 25 MCG: 50 INJECTION, SOLUTION INTRAMUSCULAR; INTRAVENOUS at 12:50

## 2023-03-17 RX ADMIN — ONDANSETRON 4 MG: 2 INJECTION INTRAMUSCULAR; INTRAVENOUS at 09:28

## 2023-03-17 RX ADMIN — FENTANYL CITRATE 50 MCG: 50 INJECTION, SOLUTION INTRAMUSCULAR; INTRAVENOUS at 08:47

## 2023-03-17 RX ADMIN — GLYCOPYRROLATE 0.4 MG: 0.2 INJECTION, SOLUTION INTRAMUSCULAR; INTRAVENOUS at 09:33

## 2023-03-17 RX ADMIN — LIDOCAINE HYDROCHLORIDE 80 MG: 20 INJECTION, SOLUTION INFILTRATION; PERINEURAL at 08:47

## 2023-03-17 RX ADMIN — OXYCODONE HYDROCHLORIDE 5 MG: 5 TABLET ORAL at 13:40

## 2023-03-17 RX ADMIN — HYDROMORPHONE HYDROCHLORIDE 0.4 MG: 0.2 INJECTION, SOLUTION INTRAMUSCULAR; INTRAVENOUS; SUBCUTANEOUS at 10:21

## 2023-03-17 RX ADMIN — FENTANYL CITRATE 50 MCG: 50 INJECTION, SOLUTION INTRAMUSCULAR; INTRAVENOUS at 08:43

## 2023-03-17 RX ADMIN — HYDROXYZINE HYDROCHLORIDE 25 MG: 50 INJECTION, SOLUTION INTRAMUSCULAR at 11:12

## 2023-03-17 RX ADMIN — SODIUM CHLORIDE, POTASSIUM CHLORIDE, SODIUM LACTATE AND CALCIUM CHLORIDE: 600; 310; 30; 20 INJECTION, SOLUTION INTRAVENOUS at 09:34

## 2023-03-17 RX ADMIN — HYDROXYZINE HYDROCHLORIDE 25 MG: 50 INJECTION, SOLUTION INTRAMUSCULAR at 12:47

## 2023-03-17 RX ADMIN — SODIUM CHLORIDE, POTASSIUM CHLORIDE, SODIUM LACTATE AND CALCIUM CHLORIDE: 600; 310; 30; 20 INJECTION, SOLUTION INTRAVENOUS at 08:42

## 2023-03-17 RX ADMIN — HYDROMORPHONE HYDROCHLORIDE 0.4 MG: 0.2 INJECTION, SOLUTION INTRAMUSCULAR; INTRAVENOUS; SUBCUTANEOUS at 10:31

## 2023-03-17 RX ADMIN — FENTANYL CITRATE 25 MCG: 50 INJECTION, SOLUTION INTRAMUSCULAR; INTRAVENOUS at 10:09

## 2023-03-17 RX ADMIN — PROPOFOL 30 MCG/KG/MIN: 10 INJECTION, EMULSION INTRAVENOUS at 08:51

## 2023-03-17 RX ADMIN — FENTANYL CITRATE 25 MCG: 50 INJECTION, SOLUTION INTRAMUSCULAR; INTRAVENOUS at 10:13

## 2023-03-17 RX ADMIN — NEOSTIGMINE METHYLSULFATE 3 MG: 1 INJECTION, SOLUTION INTRAVENOUS at 09:33

## 2023-03-17 RX ADMIN — PHENYLEPHRINE HYDROCHLORIDE 100 MCG: 10 INJECTION INTRAVENOUS at 09:10

## 2023-03-17 RX ADMIN — HYDROMORPHONE HYDROCHLORIDE 0.4 MG: 0.2 INJECTION, SOLUTION INTRAMUSCULAR; INTRAVENOUS; SUBCUTANEOUS at 11:27

## 2023-03-17 RX ADMIN — Medication 2 G: at 08:42

## 2023-03-17 RX ADMIN — PROPOFOL 200 MG: 10 INJECTION, EMULSION INTRAVENOUS at 08:47

## 2023-03-17 RX ADMIN — HYDROMORPHONE HYDROCHLORIDE 0.2 MG: 0.2 INJECTION, SOLUTION INTRAMUSCULAR; INTRAVENOUS; SUBCUTANEOUS at 11:51

## 2023-03-17 RX ADMIN — HYDROMORPHONE HYDROCHLORIDE 0.5 MG: 1 INJECTION, SOLUTION INTRAMUSCULAR; INTRAVENOUS; SUBCUTANEOUS at 09:37

## 2023-03-17 RX ADMIN — ROCURONIUM BROMIDE 30 MG: 50 INJECTION, SOLUTION INTRAVENOUS at 08:55

## 2023-03-17 ASSESSMENT — ACTIVITIES OF DAILY LIVING (ADL)
ADLS_ACUITY_SCORE: 35

## 2023-03-17 NOTE — ANESTHESIA PROCEDURE NOTES
Airway       Patient location during procedure: OR       Procedure Start/Stop Times: 3/17/2023 8:49 AM  Staff -        Anesthesiologist:  July Clancy       CRNA: Tiara Smith APRN CRNA       Performed By: CRNA  Consent for Airway        Urgency: elective  Indications and Patient Condition       Indications for airway management: asif-procedural       Induction type:RSI       Mask difficulty assessment: 0 - not attempted    Final Airway Details       Final airway type: endotracheal airway       Successful airway: ETT - single  Endotracheal Airway Details        ETT size (mm): 7.0       Cuffed: yes       Successful intubation technique: video laryngoscopy       VL Blade Size: Glidescope 3       Grade View of Cords: 1       Adjucts: stylet       Position: Right       Measured from: gums/teeth       Secured at (cm): 21       Bite block used: None    Post intubation assessment        Placement verified by: capnometry, equal breath sounds and chest rise        Number of attempts at approach: 1       Number of other approaches attempted: 0       Secured with: pink tape       Ease of procedure: easy       Dentition: Unchanged    Medication(s) Administered   Medication Administration Time: 3/17/2023 8:49 AM

## 2023-03-17 NOTE — ANESTHESIA PREPROCEDURE EVALUATION
Anesthesia Pre-Procedure Evaluation    Patient: Dave Meyer   MRN: [id number] : 1990        Procedure : Procedure(s):  LAPAROSCOPIC CHOLECYSTECTOMY          Past Medical History:   Diagnosis Date     Acne     On spironolactone until 7/15     Anovular menstruation      Depression with anxiety 2017     Depressive disorder     General anxiety and depression      Hypothyroidism 2018     Infertility associated with anovulation 6/10/2016     PCOS (polycystic ovarian syndrome)      Recurrent pregnancy loss (CODE)       Past Surgical History:   Procedure Laterality Date     NO HISTORY OF SURGERY        Allergies   Allergen Reactions     Bee Venom Anaphylaxis      Social History     Tobacco Use     Smoking status: Never     Smokeless tobacco: Never   Substance Use Topics     Alcohol use: Not Currently     Comment: social      Wt Readings from Last 1 Encounters:   23 59 kg (130 lb)        Anesthesia Evaluation            ROS/MED HX  ENT/Pulmonary:  - neg pulmonary ROS  (-) sleep apnea   Neurologic:  - neg neurologic ROS     Cardiovascular:  - neg cardiovascular ROS     METS/Exercise Tolerance:     Hematologic:       Musculoskeletal:       GI/Hepatic:     (+) cholecystitis/cholelithiasis,     Renal/Genitourinary: Comment: PCOS          Endo:     (+) thyroid problem, hypothyroidism,     Psychiatric/Substance Use:     (+) psychiatric history anxiety and depression     Infectious Disease:       Malignancy:       Other:            Physical Exam    Airway        Mallampati: II   TM distance: > 3 FB   Neck ROM: full   Mouth opening: > 3 cm    Respiratory Devices and Support         Dental       (+) Minor Abnormalities - some fillings, tiny chips      Cardiovascular   cardiovascular exam normal          Pulmonary   pulmonary exam normal                OUTSIDE LABS:  CBC:   Lab Results   Component Value Date    WBC 5.2 2023    WBC 7.1 2021    HGB 13.6 2023    HGB 10.8 (L) 2021     HCT 41.6 03/16/2023    HCT 35.1 09/16/2021     03/16/2023     (L) 09/16/2021     BMP:   Lab Results   Component Value Date     03/16/2023     08/08/2021    POTASSIUM 4.5 03/16/2023    POTASSIUM 3.7 08/08/2021    CHLORIDE 102 03/16/2023    CHLORIDE 105 08/08/2021    CO2 28 03/16/2023    CO2 21 08/08/2021    BUN 10.1 03/16/2023    BUN 5 (L) 08/08/2021    CR 1.07 (H) 03/16/2023    CR 0.58 08/08/2021    GLC 93 03/16/2023     (H) 08/08/2021     COAGS: No results found for: PTT, INR, FIBR  POC:   Lab Results   Component Value Date    HCG Negative 03/17/2023     HEPATIC:   Lab Results   Component Value Date    ALBUMIN 4.1 03/16/2023    PROTTOTAL 6.7 03/16/2023    ALT 17 03/16/2023    AST 14 03/16/2023    ALKPHOS 62 03/16/2023    BILITOTAL 0.4 03/16/2023     OTHER:   Lab Results   Component Value Date    LACT 1.6 08/08/2021    A1C 5.2 02/25/2021    DAVID 9.0 03/16/2023    LIPASE 21 03/16/2023    TSH 1.21 06/15/2022    T4 1.11 08/08/2021       Anesthesia Plan    ASA Status:  2   NPO Status:  NPO Appropriate    Anesthesia Type: General.     - Airway: ETT   Induction: Intravenous, Propofol.   Maintenance: Balanced.   Techniques and Equipment:     - Airway: Video-Laryngoscope         Consents    Anesthesia Plan(s) and associated risks, benefits, and realistic alternatives discussed. Questions answered and patient/representative(s) expressed understanding.    - Discussed:     - Discussed with:  Patient         Postoperative Care    Pain management: IV analgesics.   PONV prophylaxis: Ondansetron (or other 5HT-3), Background Propofol Infusion, Dexamethasone or Solumedrol     Comments:                Estuardo Jones DO, DO

## 2023-03-17 NOTE — ANESTHESIA POSTPROCEDURE EVALUATION
Patient: Dave Meyer    Procedure: Procedure(s):  LAPAROSCOPIC CHOLECYSTECTOMY       Anesthesia Type:  General    Note:  Disposition: Inpatient   Postop Pain Control: Uneventful            Sign Out: Well controlled pain   PONV: No   Neuro/Psych: Uneventful            Sign Out: Acceptable/Baseline neuro status   Airway/Respiratory: Uneventful            Sign Out: Acceptable/Baseline resp. status   CV/Hemodynamics: Uneventful            Sign Out: Acceptable CV status; No obvious hypovolemia; No obvious fluid overload   Other NRE: NONE   DID A NON-ROUTINE EVENT OCCUR?            Last vitals:  Vitals Value Taken Time   /70 03/17/23 1145   Temp 36.1  C (97  F) 03/17/23 1151   Pulse 72 03/17/23 1154   Resp 47 03/17/23 1154   SpO2 100 % 03/17/23 1344   Vitals shown include unvalidated device data.    Electronically Signed By: July Clancy  March 17, 2023  3:02 PM

## 2023-03-17 NOTE — BRIEF OP NOTE
[facility]    Brief Operative Note    Pre-operative diagnosis: Symptomatic cholelithiasis [K80.20]  Post-operative diagnosis Same as pre-operative diagnosis    Procedure: Procedure(s):  LAPAROSCOPIC CHOLECYSTECTOMY  Surgeon: Surgeon(s) and Role:     * Antoine Boles MD - Primary     * Radha Aaron MD - Resident - Assisting  Anesthesia: General   Estimated Blood Loss: Less than 10 ml    Drains: None  Specimens:   ID Type Source Tests Collected by Time Destination   1 : GALLBLADDER WITH CONTENTS Tissue Gallbladder SURGICAL PATHOLOGY EXAM Antoine Boles MD 3/17/2023  9:21 AM      Findings:   Mildly inflamed gallbladder with stones..  Complications: None.  Implants: * No implants in log *

## 2023-03-17 NOTE — PROVIDER NOTIFICATION
Dr Clancy called to bedside.  Pt continues to have 7-8/10 pain after several narcotic administrations.  VSS.  Abd sites c/d/i.    Verbal order from Dr Clancy for another dose of IM 25mg Visteril and 25mcq Fentanyl.  Per Dr Clancy, pt should be ok to transfer to phase 2 after monitoring after medications given.

## 2023-03-17 NOTE — DISCHARGE INSTRUCTIONS
Today you received Toradol, an antiinflammatory medication similar to Ibuprofen.  You should not take other antiinflammatory medication, such as Ibuprofen, Motrin, Advil, Aleve, Naprosyn, etc until 3:30p.m.       [facility]  Discharge Instructions: Post-Operative Laparoscopic Cholecystectomy    ACTIVITY  Expect to feel tired after your surgery.  This will gradually resolve.    Take frequent, short walks and increase your activity gradually.    Avoid strenuous physical activity or heavy lifting greater than 15-20 lbs. for 2-3 weeks.  You may climb stairs.  You may drive without restrictions when you are not using any prescription pain medication and feel comfortable in a car.  You may return to work/school when you are comfortable without any prescription pain medication.    WOUND CARE  You may remove your outer dressing or Band-Aids and shower 48 hours after the surgery.  Pat your incisions dry and leave them open to air.  Re-apply dressing (Band-Aids or gauze/tape) as needed for comfort or drainage.  You may have steri-strips (looks like white tape) on your incision.  You may peel off the steri-strips 2 weeks after your surgery if they have not peeled off on their own.   Do not soak your incisions in a tub or pool for 2 weeks.   Do not apply any lotions, creams, or ointments to your incisions.  A ridge under your incisions is normal and will gradually resolve.    DIET  Start with liquids, then gradually resume your regular diet as tolerated.  Avoid heavy, spicy, and greasy meals for 2-3 days.  Drink plenty of fluids to stay hydrated.  It is not uncommon to experience some loose stools or diarrhea after surgery.  This is your body's way of adapting to the bile which will slowly drain into your intestine.  A low fat diet may help with this.  This should improve over 1-2 months.    PAIN  Expect some tenderness and discomfort at the incision sites.  Use the prescribed pain medication at  your discretion.  Expect gradual resolution of your pain over several days.  You may take ibuprofen with food (unless you have been told not to) or acetaminophen/Tylenol instead of or in addition to your prescribed pain medication.  However, if you are taking Norco or Percocet, do not take any additional acetaminophen/Tylenol.  Do not drink alcohol or drive while you are taking pain medications.  You may apply ice to your incisions in 20 minute intervals as needed for the next 48 hours.  After that time, consider switching to heat if you prefer.    EXPECTATIONS  Pain medications can cause constipation.  Limit use when possible.  Take an over the counter or prescribed stool softener/stimulant, such as Colace or Senna, 1-2 times a day with plenty of water.  You may take a mild over the counter laxative, such as Miralax or a suppository, as needed.  You may discontinue these medications once you are having regular bowel movements and/or are no longer taking your narcotic pain medication.    You may have shoulder or upper back discomfort due to the gas used in surgery.  This is temporary and should resolve in 48-72 hours.  Short, frequent walks may help with this.  If you are unable to urinate for 8 hours or feel as though you are not emptying your bladder adequately, we recommend you seek care at an ER or Urgent Care facility for possible catheter placement.     FOLLOW UP  Our office will contact you in approximately 2-3 weeks to check on your progress and answer any questions you may have.  If you are doing well, you will not need to return for a follow up appointment.  If any concerns are identified over the phone, we will help you make an appointment to see a provider.   If you have not received a phone call, have any questions or concerns, or would like to be seen, please call us at [phone] and ask to speak with our nurse.  We are located at [address] [place] [address].    CALL OUR  OFFICE -[phone] IF YOU HAVE:   Chills or fever above 101 F.  Increased redness, warmth, or drainage at your incisions.  Significant bleeding.  Pain not relieved by your pain medication or rest.  Increasing pain after the first 48 hours.  Any other concerns or questions.   Same Day Surgery Discharge Instructions for  Sedation and General Anesthesia     It's not unusual to feel dizzy, light-headed or faint for up to 24 hours after surgery or while taking pain medication.  If you have these symptoms: sit for a few minutes before standing and have someone assist you when you get up to walk or use the bathroom.    You should rest and relax for the next 24 hours. We recommend you make arrangements to have an adult stay with you for at least 24 hours after your discharge.  Avoid hazardous and strenuous activity.    DO NOT DRIVE any vehicle or operate mechanical equipment for 24 hours following the end of your surgery.  Even though you may feel normal, your reactions may be affected by the medication you have received.    Do not drink alcoholic beverages for 24 hours following surgery.     Slowly progress to your regular diet as you feel able. It's not unusual to feel nauseated and/or vomit after receiving anesthesia.  If you develop these symptoms, drink clear liquids (apple juice, ginger ale, broth, 7-up, etc. ) until you feel better.  If your nausea and vomiting persists for 24 hours, please notify your surgeon.      All narcotic pain medications, along with inactivity and anesthesia, can cause constipation. Drinking plenty of liquids and increasing fiber intake will help.    For any questions of a medical nature, call your surgeon.    Do not make important decisions for 24 hours.    If you had general anesthesia, you may have a sore throat for a couple of days related to the breathing tube used during surgery.  You may use Cepacol lozenges to help with this discomfort.  If it worsens or if you develop a fever,  contact your surgeon.     If you feel your pain is not well managed with the pain medications prescribed by your surgeon, please contact your surgeon's office to let them know so they can address your concerns.     **If you have concerns or questions about your procedure,    please contact Dr Boles at  [phone]**

## 2023-03-17 NOTE — OR NURSING
Notified Radha Aaron, pt c/o pain despite medications.   Per pt and mom, if we discharge them home with this pain, they would go to the ED for pain.  Radha will come see pt.

## 2023-03-17 NOTE — ANESTHESIA CARE TRANSFER NOTE
Patient: Dave Meyer    Procedure: Procedure(s):  LAPAROSCOPIC CHOLECYSTECTOMY       Diagnosis: Symptomatic cholelithiasis [K80.20]  Diagnosis Additional Information: No value filed.    Anesthesia Type:   General     Note:    Oropharynx: oropharynx clear of all foreign objects  Level of Consciousness: awake  Oxygen Supplementation: face mask  Level of Supplemental Oxygen (L/min / FiO2): 6  Independent Airway: airway patency satisfactory and stable  Dentition: dentition unchanged  Vital Signs Stable: post-procedure vital signs reviewed and stable  Report to RN Given: handoff report given  Patient transferred to: PACU    Handoff Report: Identifed the Patient, Identified the Reponsible Provider, Reviewed the pertinent medical history, Discussed the surgical course, Reviewed Intra-OP anesthesia mangement and issues during anesthesia, Set expectations for post-procedure period and Allowed opportunity for questions and acknowledgement of understanding      Vitals:  Vitals Value Taken Time   BP 92/46 03/17/23 0959   Temp     Pulse 65 03/17/23 1003   Resp 13 03/17/23 1003   SpO2 100 % 03/17/23 1003   Vitals shown include unvalidated device data.    Electronically Signed By: FRIEDA Aquino CRNA  March 17, 2023  10:04 AM

## 2023-03-17 NOTE — OR NURSING
Dr Aaron at bedside ordered medication for home  to help patient with gas pain. Waiting for pharmacy to release meds .  Patient  understood and agreed to be discharge at home

## 2023-03-21 LAB
PATH REPORT.COMMENTS IMP SPEC: NORMAL
PATH REPORT.COMMENTS IMP SPEC: NORMAL
PATH REPORT.FINAL DX SPEC: NORMAL
PATH REPORT.GROSS SPEC: NORMAL
PATH REPORT.MICROSCOPIC SPEC OTHER STN: NORMAL
PATH REPORT.RELEVANT HX SPEC: NORMAL
PHOTO IMAGE: NORMAL

## 2023-03-21 NOTE — OP NOTE
General Surgery Operative Note    PREOPERATIVE DIAGNOSIS:  Symptomatic cholelithiasis [K80.20]    POSTOPERATIVE DIAGNOSIS:  Same    PROCEDURE:   Procedure(s):  LAPAROSCOPIC CHOLECYSTECTOMY    ANESTHESIA:  General.    PREOPERATIVE MEDICATIONS:  Ancef IV.    SURGEON:  Antoine Boles MD    ASSISTANT:  Radha Aaron MD  A first assistant was necessary owing to challenging laparoscopic visualization and exposure.  Retraction was also necessary.    INDICATIONS:  Biliary colic    PROCEDURE:  The patient was taken to the operating suite and uneventfully endotracheally intubated.  The abdomen was prepped and draped in a sterile fashion.  Surgeon initiated timeout was acknowledged.  We entered the abdomen in the left upper quadrant using Visiport technique.  Two other trocars were placed under laparoscopic visualization.  We elevated the liver and were able to identify a somewhat inflamed gallbladder.  The gallbladder was grasped and used to elevate the liver further.  We began dissecting out some fatty adhesions down near the neck of the gallbladder until a cystic duct was encountered.  We continued our dissection using combination of sharp and blunt dissection until the cystic duct was largely dissected out.  We continued our dissection up along the sides of the gallbladder, both medially and laterally, until we had created a space between the gallbladder and the liver.  At this point, we encountered the cystic artery, just posterior and lateral to the cystic duct.  This again was dissected out.  Once we had created a window where only the cystic artery and duct were noted to be entering the gallbladder, we felt that this represented our critical view.  The cystic artery and duct were then doubly clipped and divided.  We continued our dissection up along the body of the gallbladder, freeing all attachments and adhesions of the gallbladder to the liver.  Gallbladder was removed from the liver in an atraumatic fashion.  The  gallbladder was then brought up through the umbilical port site and removed from the abdomen.  The gallbladder fossa was reinspected, and all areas of bleeding were managed with electrocautery.  We irrigated the area with normal saline and aspirated it out.  We then removed the umbilical port trocar and closed the fascia with a figure-of-eight 0 Vicryl suture.  This was done using the Inderjit-Latoya device.  We then reinspected the abdomen, and everything appeared to be in pristine condition.  We removed the trocars under laparoscopic visualization and desufflated the abdomen with the [place] suction .  The skin edges were reapproximated with 4-0 Vicryl and Steri-Strips.  The patient was uneventfully extubated, awakened and taken to the PACU in stable condition.  At the conclusion of the case, all lap and needle counts were correct.      ESTIMATED BLOOD LOSS:  2 mL    INTRAOPERATIVE FINDINGS:  Mild gallbladder inflammation    Antoine Boles MD, MD

## 2023-03-31 ENCOUNTER — TELEPHONE (OUTPATIENT)
Dept: SURGERY | Facility: CLINIC | Age: 33
End: 2023-03-31
Payer: COMMERCIAL

## 2023-03-31 NOTE — TELEPHONE ENCOUNTER
[facility]  Post op call note - Laparoscopic Cholecystectomy    Dave Meyer was called for an update regarding her recovery.  She underwent surgery on 03/17/2023.  Today she tells me she is doing well and has no complaints or concerns about diet, bowel function or wound healing.  She has no complaints about fever/chills, n/v/d, abdominal pain, changes in urination or BM, or wound issues.     Pathology:  Final Diagnosis  A(1). Gallbladder, cholecystectomy:  -Chronic cholecystitis, and cholelithiasis.  -Negative for dysplasia or malignancy.    This was discussed with the patient.      She may remove steri strips if they are still in place and keep the wounds clean.  She may advance her activity as tolerated but avoid heavy lifting initially.  The patient states all of her questions were answered and she understands our discussion.  She agrees to follow up as needed and to call our office with any concerns.    Radha Aaron MD, MPH 03/31/23 11:58 AM   General Surgery Resident - [id number]

## 2023-06-03 ENCOUNTER — HEALTH MAINTENANCE LETTER (OUTPATIENT)
Age: 33
End: 2023-06-03

## 2023-06-15 DIAGNOSIS — E03.9 ACQUIRED HYPOTHYROIDISM: ICD-10-CM

## 2023-06-15 RX ORDER — LEVOTHYROXINE SODIUM 50 UG/1
TABLET ORAL
Qty: 90 TABLET | Refills: 4 | OUTPATIENT
Start: 2023-06-15

## 2023-06-15 NOTE — TELEPHONE ENCOUNTER
"Requested Prescriptions   Pending Prescriptions Disp Refills     levothyroxine (SYNTHROID/LEVOTHROID) 50 MCG tablet [Pharmacy Med Name: levothyroxine 50 mcg tablet] 90 tablet 4     Sig: TAKE 1 TABLET BY MOUTH ONCE DAILY       Thyroid Protocol Failed - 6/15/2023  1:22 PM        Failed - Recent (12 mo) or future (30 days) visit within the authorizing provider's specialty     Patient has had an office visit with the authorizing provider or a provider within the authorizing providers department within the previous 12 mos or has a future within next 30 days. See \"Patient Info\" tab in inbasket, or \"Choose Columns\" in Meds & Orders section of the refill encounter.              Passed - Patient is 12 years or older        Passed - Medication is active on med list        Passed - Normal TSH on file in past 12 months     Recent Labs   Lab Test 06/15/22  1633   TSH 1.21              Passed - No active pregnancy on record     If patient is pregnant or has had a positive pregnancy test, please check TSH.          Passed - No positive pregnancy test in past 12 months     If patient is pregnant or has had a positive pregnancy test, please check TSH.             Last office visit 6/15/22    Refill denied as patient still has refills available at the pharmacy.     Ladonna RYAN RN    "

## 2024-01-02 ENCOUNTER — HOSPITAL ENCOUNTER (EMERGENCY)
Facility: CLINIC | Age: 34
Discharge: HOME | End: 2024-01-02
Attending: EMERGENCY MEDICINE | Admitting: EMERGENCY MEDICINE
Payer: COMMERCIAL

## 2024-01-02 ENCOUNTER — APPOINTMENT (OUTPATIENT)
Dept: CT IMAGING | Facility: CLINIC | Age: 34
End: 2024-01-02
Attending: EMERGENCY MEDICINE
Payer: COMMERCIAL

## 2024-01-02 VITALS
SYSTOLIC BLOOD PRESSURE: 128 MMHG | HEIGHT: 61 IN | TEMPERATURE: 99.1 F | OXYGEN SATURATION: 100 % | HEART RATE: 94 BPM | BODY MASS INDEX: 23.79 KG/M2 | RESPIRATION RATE: 16 BRPM | DIASTOLIC BLOOD PRESSURE: 92 MMHG | WEIGHT: 126 LBS

## 2024-01-02 DIAGNOSIS — R56.9 FIRST TIME SEIZURE (H): ICD-10-CM

## 2024-01-02 LAB
ANION GAP SERPL CALCULATED.3IONS-SCNC: 9 MMOL/L (ref 7–15)
ATRIAL RATE - MUSE: 100 BPM
BASOPHILS # BLD AUTO: 0 10E3/UL (ref 0–0.2)
BASOPHILS NFR BLD AUTO: 0 %
BUN SERPL-MCNC: 13 MG/DL (ref 6–20)
CALCIUM SERPL-MCNC: 9.5 MG/DL (ref 8.6–10)
CHLORIDE SERPL-SCNC: 103 MMOL/L (ref 98–107)
CREAT SERPL-MCNC: 0.91 MG/DL (ref 0.51–0.95)
DEPRECATED HCO3 PLAS-SCNC: 27 MMOL/L (ref 22–29)
DIASTOLIC BLOOD PRESSURE - MUSE: NORMAL MMHG
EGFRCR SERPLBLD CKD-EPI 2021: 84 ML/MIN/1.73M2
EOSINOPHIL # BLD AUTO: 0 10E3/UL (ref 0–0.7)
EOSINOPHIL NFR BLD AUTO: 1 %
ERYTHROCYTE [DISTWIDTH] IN BLOOD BY AUTOMATED COUNT: 12.8 % (ref 10–15)
GLUCOSE SERPL-MCNC: 121 MG/DL (ref 70–99)
HCG SERPL QL: NEGATIVE
HCT VFR BLD AUTO: 38 % (ref 35–47)
HGB BLD-MCNC: 13.1 G/DL (ref 11.7–15.7)
IMM GRANULOCYTES # BLD: 0 10E3/UL
IMM GRANULOCYTES NFR BLD: 0 %
INTERPRETATION ECG - MUSE: NORMAL
LYMPHOCYTES # BLD AUTO: 1.5 10E3/UL (ref 0.8–5.3)
LYMPHOCYTES NFR BLD AUTO: 21 %
MCH RBC QN AUTO: 30.6 PG (ref 26.5–33)
MCHC RBC AUTO-ENTMCNC: 34.5 G/DL (ref 31.5–36.5)
MCV RBC AUTO: 89 FL (ref 78–100)
MONOCYTES # BLD AUTO: 0.5 10E3/UL (ref 0–1.3)
MONOCYTES NFR BLD AUTO: 7 %
NEUTROPHILS # BLD AUTO: 5.1 10E3/UL (ref 1.6–8.3)
NEUTROPHILS NFR BLD AUTO: 71 %
NRBC # BLD AUTO: 0 10E3/UL
NRBC BLD AUTO-RTO: 0 /100
P AXIS - MUSE: 88 DEGREES
PLATELET # BLD AUTO: 195 10E3/UL (ref 150–450)
POTASSIUM SERPL-SCNC: 4.8 MMOL/L (ref 3.4–5.3)
PR INTERVAL - MUSE: 126 MS
QRS DURATION - MUSE: 66 MS
QT - MUSE: 342 MS
QTC - MUSE: 441 MS
R AXIS - MUSE: 82 DEGREES
RBC # BLD AUTO: 4.28 10E6/UL (ref 3.8–5.2)
SODIUM SERPL-SCNC: 139 MMOL/L (ref 135–145)
SYSTOLIC BLOOD PRESSURE - MUSE: NORMAL MMHG
T AXIS - MUSE: 54 DEGREES
VENTRICULAR RATE- MUSE: 100 BPM
WBC # BLD AUTO: 7.3 10E3/UL (ref 4–11)

## 2024-01-02 PROCEDURE — 80048 BASIC METABOLIC PNL TOTAL CA: CPT | Performed by: EMERGENCY MEDICINE

## 2024-01-02 PROCEDURE — 93005 ELECTROCARDIOGRAM TRACING: CPT

## 2024-01-02 PROCEDURE — 70450 CT HEAD/BRAIN W/O DYE: CPT | Mod: 26 | Performed by: RADIOLOGY

## 2024-01-02 PROCEDURE — 85025 COMPLETE CBC W/AUTO DIFF WBC: CPT | Performed by: EMERGENCY MEDICINE

## 2024-01-02 PROCEDURE — 99285 EMERGENCY DEPT VISIT HI MDM: CPT | Mod: 25

## 2024-01-02 PROCEDURE — 70450 CT HEAD/BRAIN W/O DYE: CPT

## 2024-01-02 PROCEDURE — 36415 COLL VENOUS BLD VENIPUNCTURE: CPT | Performed by: EMERGENCY MEDICINE

## 2024-01-02 PROCEDURE — 84703 CHORIONIC GONADOTROPIN ASSAY: CPT | Performed by: EMERGENCY MEDICINE

## 2024-01-02 ASSESSMENT — ACTIVITIES OF DAILY LIVING (ADL)
ADLS_ACUITY_SCORE: 35
ADLS_ACUITY_SCORE: 35

## 2024-01-02 NOTE — ED TRIAGE NOTES
Pt arrived BIBA from a teachers lecture where she was sitting in a chair - person next top her heard some noise and saw pt with her head back and shaking . Pt did not fall - the episode lasted for about 1 minute. Pt does not recall what had happened and is clear now - was a bit foggy after incident . Pt mentioned that she had a 7 hour car ride yesterday .  Pt did not lose bowel or bladder and did not bite her tongue.      Triage Assessment (Adult)       Row Name 01/02/24 1213          Triage Assessment    Airway WDL WDL        Respiratory WDL    Respiratory WDL WDL        Skin Circulation/Temperature WDL    Skin Circulation/Temperature WDL WDL        Cardiac WDL    Cardiac WDL WDL        Peripheral/Neurovascular WDL    Peripheral Neurovascular WDL WDL        Cognitive/Neuro/Behavioral WDL    Cognitive/Neuro/Behavioral WDL WDL

## 2024-01-02 NOTE — ED NOTES
Bed: ED10  Expected date:   Expected time:   Means of arrival:   Comments:  433 33 first time seizure ETA 1157

## 2024-01-02 NOTE — ED PROVIDER NOTES
"  History     Chief Complaint:  Seizures       The history is provided by the patient.      Dave Meyer is a 33 year old female with a history of hypothyroidism and anxiety who presents via EMS following observed seizure-like activity at work about 2 hours ago. 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 Then she awoke with a woman in front of her, and she remembers feeling confused. Denies pain or neck stiffness. Denies vision changes, tinnitus, tingling, or odd taste in her mouth. No family history of seizure disorders or other neurologic disorders. Patient underwent a cholecystectomy in March without complication.  She denies significant alcohol usage or illicit drug usage.    Independent Historian:   We spoke with the patient's coworkers, who witnessed some of the seizure activity.  The person sitting next to the patient during the lecture noted that she had started moaning followed by generalized seizure activity for roughly 1 minute; resolved spontaneously.  Patient was lowered to the floor.  No trauma.    Review of External Notes:   N/A    Medications:    Cymbalta  Levothyroxine   Wellbutrin 300 mg  Buspar  Klonopin     Past Medical History:    Depression   Anxiety   Hypothyroidism   PCOS    Past Surgical History:    Laparoscopic cholecystectomy      Physical Exam   Patient Vitals for the past 24 hrs:   BP Temp Temp src Pulse Resp SpO2 Height Weight   01/02/24 1216 -- -- -- -- 16 -- -- --   01/02/24 1207 -- (P) 99.1  F (37.3  C) -- -- -- 100 % 1.549 m (5' 1\") 57.2 kg (126 lb)   01/02/24 1206 (!) 128/92 99.1  F (37.3  C) Temporal 94 -- 99 % -- --        Physical Exam  General: Alert and cooperative with exam. Patient in mild distress. Normal mentation.  Head:  Scalp is NC/AT  Eyes:  No scleral icterus, PERRL, EOMI   ENT:  The external nose and ears are normal. The oropharynx is normal and without erythema; mucus membranes are moist. Uvula midline, no evidence of deep " space infection.  Neck:  Normal range of motion without rigidity.  CV:  Regular rate and rhythm    No pathologic murmur   Resp:  Breath sounds are clear bilaterally    Non-labored, no retractions or accessory muscle use  GI:  Abdomen is soft, no distension, no tenderness. No peritoneal signs  MS:  No lower extremity edema   Skin:  Warm and dry, No rash or lesions noted.  Neuro: Oriented x 3. No gross motor deficits.    Strength and sensation grossly intact in all 4 extremities.      Cranial nerves 2-12 intact.    GCS: 15    Emergency Department Course   ECG  ECG taken at 1232, ECG read at 1240  Normal sinus rhythm. Normal ECG.   Rate 100 bpm. TN interval 126 ms. QRS duration 66 ms. QT/QTc 342/441 ms. P-R-T axes 88 82 54.     Imaging:  Head CT w/o contrast   Final Result   IMPRESSION: No acute intracranial pathology.       RAYMUNDO FERRARO MD            SYSTEM ID:  [id number]         Report per radiology    Laboratory:  Labs Ordered and Resulted from Time of ED Arrival to Time of ED Departure   BASIC METABOLIC PANEL - Abnormal       Result Value    Sodium 139      Potassium 4.8      Chloride 103      Carbon Dioxide (CO2) 27      Anion Gap 9      Urea Nitrogen 13.0      Creatinine 0.91      GFR Estimate 84      Calcium 9.5      Glucose 121 (*)    HCG QUALITATIVE PREGNANCY - Normal    hCG Serum Qualitative Negative     CBC WITH PLATELETS AND DIFFERENTIAL    WBC Count 7.3      RBC Count 4.28      Hemoglobin 13.1      Hematocrit 38.0      MCV 89      MCH 30.6      MCHC 34.5      RDW 12.8      Platelet Count 195      % Neutrophils 71      % Lymphocytes 21      % Monocytes 7      % Eosinophils 1      % Basophils 0      % Immature Granulocytes 0      NRBCs per 100 WBC 0      Absolute Neutrophils 5.1      Absolute Lymphocytes 1.5      Absolute Monocytes 0.5      Absolute Eosinophils 0.0      Absolute Basophils 0.0      Absolute Immature Granulocytes 0.0      Absolute NRBCs 0.0        Emergency Department Course &  Assessments:       Interventions:  Medications - No data to display     Independent Interpretation (X-rays, CTs, rhythm strip):  CT head: No intracranial bleeding    Assessments/Consultations/Discussion of Management or Tests:  ED Course as of 01/02/24 2208 Tue Jan 02, 2024   1221 CLEO Griffiths student, obtained initial history.    1242 I obtained the history and examined the patient as noted above.        Social Determinants of Health affecting care:   None    Disposition:  The patient was discharged to home.     Impression & Plan    Medical Decision Making:  This is a 33 year old female presenting after possible seizure.  ABCs were intact on presentation and there was no sign of trauma and no need for c-spine precautions based on history and presentation.  She has VS that are stable and appropriate and a physical exam that shows no acute abnormalities, of note there are no focal neurologic findings or changes to baseline mental status at this time . This is a first time seizure-like episode with complete recovery and no abnormal findings on workup.  EKG, head CT, and labs without significant findings.  Recommended close outpatient follow-up with neurology.  Seizure precautions discussed including restriction on driving.  No indication for antiepileptics at this time.  Return precautions discussed.  Patient asymptomatic at discharge.    Diagnosis:    ICD-10-CM    1. First time seizure (H)  R56.9            Scribe Disclosure:  I, Tanya Winchester, am serving as a scribe at 12:26 PM on 1/2/2024 to document services personally performed by Corey Ahuja DO based on my observations and the provider's statements to me.     1/2/2024   Corey Ahuja DO O'Neill, Christopher Warren, DO  01/02/24 2210

## 2024-05-31 ENCOUNTER — TELEPHONE (OUTPATIENT)
Dept: PHARMACY | Facility: OTHER | Age: 34
End: 2024-05-31
Payer: COMMERCIAL

## 2024-05-31 NOTE — TELEPHONE ENCOUNTER
[facility] Recruitment: [facility]     Referral outreach attempt #1 on May 31, 2024      Outcome: left voicemail- Call back number [phone]    [facility] Breaux

## 2024-06-04 ENCOUNTER — TELEPHONE (OUTPATIENT)
Dept: PHARMACY | Facility: OTHER | Age: 34
End: 2024-06-04
Payer: COMMERCIAL

## 2024-06-04 NOTE — TELEPHONE ENCOUNTER
[facility] Recruitment: [facility]     Referral outreach attempt #2 on June 4, 2024      Outcome: left voicemail- Call back number [phone]    [facility]

## 2024-06-10 ENCOUNTER — TELEPHONE (OUTPATIENT)
Dept: PHARMACY | Facility: OTHER | Age: 34
End: 2024-06-10
Payer: COMMERCIAL

## 2024-06-10 NOTE — TELEPHONE ENCOUNTER
Called patient to see if they are interested in completing a Medication Therapy Management (MTM) visit. They were referred for a comprehensive medication review by their [facility] insurance.     Referral outreach attempt #3 on Blanca 10, 2024      Outcome: left voicemail- Call back number [phone]. Pt to call back if interested in scheduling MTM appointment.     Daiana Del Angel, PharmD, [facility]  Medication Therapy Management Provider

## 2024-06-19 NOTE — TELEPHONE ENCOUNTER
MTM Recruitment: [facility] insurance     Referral outreach attempt #4 on June 19, 2024      Outcome: left voicemail- Call back number [phone]    Riya Beverly, PharmD, BCACP  Medication Therapy Management Provider, [facility]

## 2024-07-13 ENCOUNTER — HEALTH MAINTENANCE LETTER (OUTPATIENT)
Age: 34
End: 2024-07-13

## 2024-08-21 DIAGNOSIS — E03.9 ACQUIRED HYPOTHYROIDISM: ICD-10-CM

## 2024-08-21 RX ORDER — LEVOTHYROXINE SODIUM 50 UG/1
50 TABLET ORAL DAILY
Qty: 90 TABLET | Refills: 4 | OUTPATIENT
Start: 2024-08-21

## 2024-08-21 NOTE — TELEPHONE ENCOUNTER
Requested Prescriptions   Pending Prescriptions Disp Refills    levothyroxine (SYNTHROID/LEVOTHROID) 50 MCG tablet [Pharmacy Med Name: levothyroxine 50 mcg tablet] 90 tablet 4     Sig: TAKE 1 TABLET BY MOUTH ONCE DAILY       Thyroid Protocol Failed - 8/21/2024  8:04 AM        Failed - Recent (12 mo) or future (30 days) visit within the authorizing provider's specialty     The patient must have completed an in-person or virtual visit within the past 12 months or has a future visit scheduled within the next 90 days with the authorizing provider s specialty.  Urgent care and e-visits do not quality as an office visit for this protocol.          Failed - Normal TSH on file in past 12 months     Recent Labs   Lab Test 06/15/22  1633   TSH 1.21              Passed - Patient is 12 years or older        Passed - Medication is active on med list        Passed - Medication indicated for associated diagnosis     Medication is associated with one or more of the following diagnoses:  Hypothyroidism  Thyroid stimulating hormone suppression therapy  Thyroid cancer  Acquired atrophy of thyroid          Passed - No active pregnancy on record     If patient is pregnant or has had a positive pregnancy test, please check TSH.          Passed - No positive pregnancy test in past 12 months     If patient is pregnant or has had a positive pregnancy test, please check TSH.             Last Written Prescription Date:  6/15/23  Last Fill Quantity: 90,  # refills: 4   Last office visit: 6/15/22; last virtual visit: 6/15/23 :  [facility]   Future Office Visit:  NONE    Rx denied. Pt has not been seen since 2022 or had TSH labs. Needs to schedule appointment    Kimberly Burroughs RN on 8/21/2024 at 1:16 PM  [facility] Triage

## 2025-07-19 ENCOUNTER — HEALTH MAINTENANCE LETTER (OUTPATIENT)
Age: 35
End: 2025-07-19

## (undated) DEVICE — PREP CHLORAPREP 26ML TINTED HI-LITE ORANGE 930815

## (undated) DEVICE — GLOVE BIOGEL PI MICRO SZ 7.5 48575

## (undated) DEVICE — ENDO POUCH UNIV RETRIEVAL SYSTEM INZII 10MM CD001

## (undated) DEVICE — GLOVE BIOGEL PI MICRO INDICATOR UNDERGLOVE SZ 7.5 48975

## (undated) DEVICE — DEVICE SUTURE PASSER 14GA WECK EFX EFXSP2

## (undated) DEVICE — ENDO TROCAR FIRST ENTRY KII FIOS Z-THRD 11X100MM CTF33

## (undated) DEVICE — ESU GROUND PAD UNIVERSAL W/O CORD

## (undated) DEVICE — SU VICRYL 4-0 PS-2 18" UND J496H

## (undated) DEVICE — CLIP APPLIER ENDO 5MM M/L LIGAMAX EL5ML

## (undated) DEVICE — SOL WATER IRRIG 1000ML BOTTLE 2F7114

## (undated) DEVICE — ESU HOLDER LAP INST DISP PURPLE LONG 330MM H-PRO-330

## (undated) DEVICE — ENDO TROCAR SLEEVE KII Z-THREADED 05X100MM CTS02

## (undated) DEVICE — SU VICRYL 0 UR-6 27" J603H

## (undated) DEVICE — SOL NACL 0.9% INJ 1000ML BAG 2B1324X

## (undated) DEVICE — PACK LAP CHOLE SLC15LCFSD

## (undated) DEVICE — SUCTION IRR STRYKERFLOW II W/TIP 250-070-520

## (undated) DEVICE — ENDO TROCAR FIRST ENTRY KII FIOS Z-THRD 05X100MM CTF03

## (undated) DEVICE — ENDO SCOPE WARMER LF TM500

## (undated) DEVICE — DEVICE SUTURE GRASPER TROCAR CLOSURE 14GA PMITCSG

## (undated) DEVICE — LINEN TOWEL PACK X5 5464

## (undated) RX ORDER — OXYCODONE HYDROCHLORIDE 5 MG/1
TABLET ORAL
Status: DISPENSED
Start: 2023-03-17

## (undated) RX ORDER — HYDROMORPHONE HYDROCHLORIDE 1 MG/ML
INJECTION, SOLUTION INTRAMUSCULAR; INTRAVENOUS; SUBCUTANEOUS
Status: DISPENSED
Start: 2023-03-17

## (undated) RX ORDER — HYDROMORPHONE HCL IN WATER/PF 6 MG/30 ML
PATIENT CONTROLLED ANALGESIA SYRINGE INTRAVENOUS
Status: DISPENSED
Start: 2023-03-17

## (undated) RX ORDER — ONDANSETRON 2 MG/ML
INJECTION INTRAMUSCULAR; INTRAVENOUS
Status: DISPENSED
Start: 2023-03-17

## (undated) RX ORDER — GLYCOPYRROLATE 0.2 MG/ML
INJECTION, SOLUTION INTRAMUSCULAR; INTRAVENOUS
Status: DISPENSED
Start: 2023-03-17

## (undated) RX ORDER — PROPOFOL 10 MG/ML
INJECTION, EMULSION INTRAVENOUS
Status: DISPENSED
Start: 2023-03-17

## (undated) RX ORDER — KETOROLAC TROMETHAMINE 30 MG/ML
INJECTION, SOLUTION INTRAMUSCULAR; INTRAVENOUS
Status: DISPENSED
Start: 2023-03-17

## (undated) RX ORDER — METHOCARBAMOL 750 MG/1
TABLET, FILM COATED ORAL
Status: DISPENSED
Start: 2023-03-17

## (undated) RX ORDER — NEOSTIGMINE METHYLSULFATE 1 MG/ML
VIAL (ML) INJECTION
Status: DISPENSED
Start: 2023-03-17

## (undated) RX ORDER — DEXAMETHASONE SODIUM PHOSPHATE 4 MG/ML
INJECTION, SOLUTION INTRA-ARTICULAR; INTRALESIONAL; INTRAMUSCULAR; INTRAVENOUS; SOFT TISSUE
Status: DISPENSED
Start: 2023-03-17

## (undated) RX ORDER — FENTANYL CITRATE 50 UG/ML
INJECTION, SOLUTION INTRAMUSCULAR; INTRAVENOUS
Status: DISPENSED
Start: 2023-03-17

## (undated) RX ORDER — CEFAZOLIN SODIUM/WATER 2 G/20 ML
SYRINGE (ML) INTRAVENOUS
Status: DISPENSED
Start: 2023-03-17